# Patient Record
Sex: FEMALE | Race: WHITE | NOT HISPANIC OR LATINO | Employment: OTHER | URBAN - METROPOLITAN AREA
[De-identification: names, ages, dates, MRNs, and addresses within clinical notes are randomized per-mention and may not be internally consistent; named-entity substitution may affect disease eponyms.]

---

## 2017-01-19 ENCOUNTER — LAB (OUTPATIENT)
Dept: LAB | Facility: HOSPITAL | Age: 67
End: 2017-01-19

## 2017-01-19 ENCOUNTER — CONSULT (OUTPATIENT)
Dept: ONCOLOGY | Facility: CLINIC | Age: 67
End: 2017-01-19

## 2017-01-19 ENCOUNTER — APPOINTMENT (OUTPATIENT)
Dept: ONCOLOGY | Facility: CLINIC | Age: 67
End: 2017-01-19

## 2017-01-19 ENCOUNTER — DOCUMENTATION (OUTPATIENT)
Dept: ONCOLOGY | Facility: CLINIC | Age: 67
End: 2017-01-19

## 2017-01-19 VITALS
HEIGHT: 66 IN | BODY MASS INDEX: 32.95 KG/M2 | TEMPERATURE: 98.3 F | DIASTOLIC BLOOD PRESSURE: 64 MMHG | WEIGHT: 205 LBS | HEART RATE: 92 BPM | OXYGEN SATURATION: 96 % | RESPIRATION RATE: 16 BRPM | SYSTOLIC BLOOD PRESSURE: 112 MMHG

## 2017-01-19 DIAGNOSIS — C50.919 MALIGNANT NEOPLASM OF FEMALE BREAST, UNSPECIFIED LATERALITY, UNSPECIFIED SITE OF BREAST: Primary | ICD-10-CM

## 2017-01-19 DIAGNOSIS — C50.411 MALIGNANT NEOPLASM OF UPPER-OUTER QUADRANT OF RIGHT FEMALE BREAST (HCC): Primary | ICD-10-CM

## 2017-01-19 LAB
ALBUMIN SERPL-MCNC: 4.1 G/DL (ref 3.5–5.2)
ALBUMIN/GLOB SERPL: 1.4 G/DL (ref 1.1–2.4)
ALP SERPL-CCNC: 99 U/L (ref 38–116)
ALT SERPL W P-5'-P-CCNC: 13 U/L (ref 0–33)
ANION GAP SERPL CALCULATED.3IONS-SCNC: 12.2 MMOL/L
AST SERPL-CCNC: 15 U/L (ref 0–32)
BASOPHILS # BLD AUTO: 0.03 10*3/MM3 (ref 0–0.1)
BASOPHILS NFR BLD AUTO: 0.5 % (ref 0–1.1)
BILIRUB SERPL-MCNC: 0.3 MG/DL (ref 0.1–1.2)
BUN BLD-MCNC: 15 MG/DL (ref 6–20)
BUN/CREAT SERPL: 19.2 (ref 7.3–30)
CALCIUM SPEC-SCNC: 9.1 MG/DL (ref 8.5–10.2)
CHLORIDE SERPL-SCNC: 106 MMOL/L (ref 98–107)
CO2 SERPL-SCNC: 24.8 MMOL/L (ref 22–29)
CREAT BLD-MCNC: 0.78 MG/DL (ref 0.6–1.1)
DEPRECATED RDW RBC AUTO: 43.8 FL (ref 37–49)
EOSINOPHIL # BLD AUTO: 0.15 10*3/MM3 (ref 0–0.36)
EOSINOPHIL NFR BLD AUTO: 2.5 % (ref 1–5)
ERYTHROCYTE [DISTWIDTH] IN BLOOD BY AUTOMATED COUNT: 13.2 % (ref 11.7–14.5)
GFR SERPL CREATININE-BSD FRML MDRD: 73 ML/MIN/1.73
GLOBULIN UR ELPH-MCNC: 2.9 GM/DL (ref 1.8–3.5)
GLUCOSE BLD-MCNC: 101 MG/DL (ref 74–124)
HCT VFR BLD AUTO: 38.6 % (ref 34–45)
HGB BLD-MCNC: 12.7 G/DL (ref 11.5–14.9)
HOLD SPECIMEN: NORMAL
IMM GRANULOCYTES # BLD: 0.03 10*3/MM3 (ref 0–0.03)
IMM GRANULOCYTES NFR BLD: 0.5 % (ref 0–0.5)
LYMPHOCYTES # BLD AUTO: 2.11 10*3/MM3 (ref 1–3.5)
LYMPHOCYTES NFR BLD AUTO: 35.1 % (ref 20–49)
MCH RBC QN AUTO: 29.4 PG (ref 27–33)
MCHC RBC AUTO-ENTMCNC: 32.9 G/DL (ref 32–35)
MCV RBC AUTO: 89.4 FL (ref 83–97)
MONOCYTES # BLD AUTO: 0.47 10*3/MM3 (ref 0.25–0.8)
MONOCYTES NFR BLD AUTO: 7.8 % (ref 4–12)
NEUTROPHILS # BLD AUTO: 3.22 10*3/MM3 (ref 1.5–7)
NEUTROPHILS NFR BLD AUTO: 53.6 % (ref 39–75)
NRBC BLD MANUAL-RTO: 0 /100 WBC (ref 0–0)
PLATELET # BLD AUTO: 293 10*3/MM3 (ref 150–375)
PMV BLD AUTO: 9.3 FL (ref 8.9–12.1)
POTASSIUM BLD-SCNC: 4 MMOL/L (ref 3.5–4.7)
PROT SERPL-MCNC: 7 G/DL (ref 6.3–8)
RBC # BLD AUTO: 4.32 10*6/MM3 (ref 3.9–5)
SODIUM BLD-SCNC: 143 MMOL/L (ref 134–145)
WBC NRBC COR # BLD: 6.01 10*3/MM3 (ref 4–10)
WHOLE BLOOD HOLD SPECIMEN: NORMAL

## 2017-01-19 PROCEDURE — 80053 COMPREHEN METABOLIC PANEL: CPT | Performed by: INTERNAL MEDICINE

## 2017-01-19 PROCEDURE — 36415 COLL VENOUS BLD VENIPUNCTURE: CPT

## 2017-01-19 PROCEDURE — 85025 COMPLETE CBC W/AUTO DIFF WBC: CPT

## 2017-01-19 PROCEDURE — 99205 OFFICE O/P NEW HI 60 MIN: CPT | Performed by: INTERNAL MEDICINE

## 2017-01-19 NOTE — PROGRESS NOTES
Subjective   REASON FOR CONSULTATION:  Right breast cancer T1c N0 ER/TN positive HER-2 negative  Provide an opinion on any further workup or treatment                             REQUESTING PHYSICIAN:  Trino Troncoso M.D.    RECORDS OBTAINED:  Records of the patients history including those obtained from the referring provider were reviewed and summarized in detail.    HISTORY OF PRESENT ILLNESS:  The patient is a 66 y.o. year old female who is here for an opinion about the above issue.    History of Present Illness patient is a 66-year-old lady with a long history of depression hypercholesterolemia who was noted on routine mammogram on 16 to have an abnormality in the right breast at the 12 o'clock position.  This is not seen on a previous mammogram a year ago and this led to a diagnostic mammogram and ultrasound which confirmed a mass 12 o'clock position the right breast measuring about 1 cm by ultrasound with normal appearing right axillary lymph nodes.  A biopsy was done on 13 which revealed a grade 2 infiltrating ductal carcinoma ER/TN strongly positive HER-2 negative and the patient was referred to Dr. Troncoso.  Dr. Troncoso scheduled MRI of both breasts on 16 and this revealed a 1.8 x 1.3 cm mass in the right breast with no other abnormalities in the axilla or in the left breast and the patient was taken to surgery on 16 which time she had a needle localization lumpectomy and sentinel node biopsy.  Final pathology showed a 1.5 x 1.3 cm grade 2 infiltrating ductal carcinoma  With associated DCIS and margins that were clear after reexcision and one negative sentinel node.  Patient is on well since surgery and is here to discuss adjuvant chemotherapy options.  She was  3 para 3 first childbirth was at night age 17 she did not breast-feed her children.  Millicuries at age 11 and menopause at age 45 when she had a hysterectomy and oophorectomy.  She took hormonal therapy for 10 years and  stopped in 2005 when her sister had breast cancer she was on birth control pills for at least 20 years prior to her hysterectomy.  She has a sister who developed breast cancer at age 58 and her second cancer 2010 although one of these was DCIS and the other invasive cancer.  She took tamoxifen for 5 years  Is no other breast or ovarian cancer in the family but she has a very limited family as her father was an only child and her mother had 1 brother and she has one sister    Past Medical History   Diagnosis Date   • Breast CA    • Mitral valve regurgitation    • Mood swings    • PONV (postoperative nausea and vomiting)    • Sleep apnea         Past Surgical History   Procedure Laterality Date   • Tubal abdominal ligation     • Thyroid cyst excision     • Percutaneous pinning finger fracture     • Hysterectomy     • Breast biopsy     • Breast lumpectomy with sentinel node biopsy and axillary node dissection Right 12/13/2016     Procedure: RT BREAST LUMPECTOMY WITH SENTINEL NODE BIOPSY & MAMMO NEEDLE LOC;  Surgeon: Matt Troncoso MD;  [place] [facility] [place] [facility] [place]  Service:         Current Outpatient Prescriptions on File Prior to Visit   Medication Sig Dispense Refill   • busPIRone (BUSPAR) 10 MG tablet Take 10 mg by mouth Daily As Needed.     • Cholecalciferol (VITAMIN D PO) Take 1 tablet/day by mouth Daily.     • FLUoxetine (PROzac) 40 MG capsule Take 40 mg by mouth Daily.     • rosuvastatin (CRESTOR) 10 MG tablet Take 10 mg by mouth Daily.       No current facility-administered medications on file prior to visit.         ALLERGIES:    Allergies   Allergen Reactions   • Tegaderm Ag Mesh [Silver] Other (See Comments)     SKIN BLISTERS   • Sulfa Antibiotics Rash   • Zithromax [Azithromycin] Rash        Social History     Social History   • Marital status:      Spouse name: N/A   • Number of children: N/A   • Years of education: N/A     Occupational History   • Retired      Social History Main Topics   • Smoking  "status: Never Smoker   • Smokeless tobacco: Never Used   • Alcohol use No   • Drug use: No   • Sexual activity: Defer     Other Topics Concern   • None     Social History Narrative        No family history on file.    at 65 of metastatic colon cancer his parents lived to their 80s without cancer mother  at 93 of CHF and had only one brother who had no cancer.  She has only one sister who had breast cancer at age 58 and again at 63 no ovarian cancer in the family  Review of Systems   Constitutional: Negative for activity change, appetite change, fatigue and unexpected weight change.   Respiratory: Negative for cough and shortness of breath.    Musculoskeletal: Positive for back pain.        Objective     Vitals:    17 1437   BP: 112/64   Pulse: 92   Resp: 16   Temp: 98.3 °F (36.8 °C)   TempSrc: Oral   SpO2: 96%   Weight: 205 lb (93 kg)   Height: 66\" (167.6 cm)   PainSc: 0-No pain     Current Status 2017   ECOG score 0       Physical Exam    GENERAL:  Well-developed, well-nourished in no acute distress.   SKIN:  Warm, dry without rashes, purpura or petechiae.  EYES:  Pupils equal, round and reactive to light.  EOMs intact.  Conjunctivae normal.  EARS:  Hearing intact.  NOSE:  Septum midline.  No excoriations or nasal discharge.  MOUTH:  Tongue is well-papillated; no stomatitis or ulcers.  Lips normal.  THROAT:  Oropharynx without lesions or exudates.  NECK:  Supple with good range of motion; no thyromegaly or masses, no JVD.  LYMPHATICS:  No cervical, supraclavicular, axillary or inguinal adenopathy.  CHEST:  Lungs clear to auscultation. Good airflow.  CARDIAC:  Regular rate and rhythm without murmurs, rubs or gallops. Normal S1,S2.  ABDOMEN:  Soft, nontender with no hepatosplenomegaly or masses.  EXTREMITIES:  No clubbing, cyanosis or edema.  NEUROLOGICAL:  Cranial Nerves II-XII grossly intact.  No focal neurological deficits.  PSYCHIATRIC:  Normal affect and mood.        RECENT LABS:  Hematology " "WBC   Date Value Ref Range Status   01/19/2017 6.01 4.00 - 10.00 10*3/mm3 Final     RBC   Date Value Ref Range Status   01/19/2017 4.32 3.90 - 5.00 10*6/mm3 Final     HEMOGLOBIN   Date Value Ref Range Status   01/19/2017 12.7 11.5 - 14.9 g/dL Final     HEMATOCRIT   Date Value Ref Range Status   01/19/2017 38.6 34.0 - 45.0 % Final     PLATELETS   Date Value Ref Range Status   01/19/2017 293 150 - 375 10*3/mm3 Final        Final Diagnosis   1. BREAST, RIGHT, LUMPECTOMY WITH IMAGE-GUIDED LOCALIZATION:  INVASIVE DUCTAL CARCINOMA WITH ASSOCIATED DUCTAL CARCINOMA IN SITU (DCIS).  SEE SYNOPTIC REPORT (BELOW) FOR ADDITIONAL DETAILS.     2. SENTINEL LYMPH NODE, RIGHT AXILLA, EXCISION:  ONE LYMPH NODE, NEGATIVE FOR METASTATIC CARCINOMA.     3. BREAST, RIGHT, DESIGNATED \"ADDITONAL INFERIOR MARGIN\", RE-EXCISION:  BENIGN BREAST TISSUE.  NEW INFERIOR IS FREE OF ATYPIA AND MALIGNANCY.     4. BREAST, RIGHT, DESIGNATED \"ADDTIONAL SUPERIOR MARGIN\", RE-EXCISION:  BENIGN BREAST TISSUE.  NEW SUPERIOR MARGIN IS FREE OF ATYPIA AND MALIGNANCY.     5. BREAST, RIGHT, DESIGNATED \"ADDITIONAL LATERAL MARGIN\", RE-EXCISION:  BENIGN BREAST TISSUE.  NEW LATERAL MARGIN IS FREE OF ATYPIA AND MALIGNANCY.     SYNOPTIC REPORT (Based on [facility] Cancer Case Summary, version January 2016):  Procedure: Excision with image-guided localization and subsequent re-excision of inferior, superior,  and lateral margins.  Lymph Node Sampling: [place] lymph node.  Specimen Laterality: Right.  Tumor Site: Not specified.  Tumor Size (size of largest invasive carcinoma): 1.5 x 1.3 x 1.2 cm .   Histologic Type: Invasive ductal carcinoma.  Histologic Grade ([place] Histologic Score):  Glandular (ascinar)/tubular differentiation: Score 3.  Nuclear Pleomorphism: Score 2.  Mitotic Rate: Score 1.  Overall Grade: Grade 2 (score 6 of 9).  Tumor Focality: Single focus of invasive carcinoma.  Ductal Carcinoma In Situ (DCIS): DCIS is present.  Architectural Patterns: Solid and " cribriform.  Nuclear Grade: Grade II (intermediate).  Necrosis: Not identified.  Lobular Carcinoma In Situ (LCIS): Not identified.  Margins:  Anterior margin: Focally less than 1 mm from DCIS and 1 mm from invasive carcinoma.  Posterior margin: 1 mm from invasive carcinoma.  Superior margin: Greater than 10 mm from invasive carcinoma and from DCIS.  Inferior margin: Greater than 10 mm from invasive carcinoma and from DCIS.  Medial margin: 1 mm from invasive carcinoma.  Lateral margin: Greater than 10 mm from invasive carcinoma and from DCIS.  Lymph Nodes:  Total number of lymph nodes examined (sentinel and non-sentinel): 1.  Number of sentinel lymph nodes examined: 1.  Number of lymph nodes with macrometastases: 0.  Number of lymph nodes with micrometastases: 0.  Number of lymph nodes with isolated tumor cells: 0.  Method of evaluation of sentinel lymph nodes: H&E, multiple levels (confirmatory   immunohistochemical stains are available upon request.)  Treatment Effect (response to presurgical neoadjuvant therapy):  In the breast: No known presurgical therapy.  In the lymph nodes: No known presurgical therapy.  Lymph-vascular Invasion: Not identified.  Dermal lymph-vasclar Invasion: No skin present for evaluation.  Pathologic Staging:  Primary tumor: pT1c.  Regional Lymph Nodes: pN0(sn).  Distant Metastasis: Not applicable.   Additional Pathologic Findings:   Fibrocystic changes with duct dilatation and stasis, microcyst formation, and apocrine metaplasia.  Ductal hyperplasia of the usual type.  Focal columnar cell change without atypia.  Fibroinflammatory changes consistent with site of prior biopsy.  Ancillary studies: ER, NM, HER-2/darius, and Ki-67 studies performed on previous biopsy specimen at   outside facility ([facility]).     TEE/arcadio IHC/       Assessment/Plan   1.  T1 CN 0M0 ER/NM positive HER-2 negative right-sided breast cancer  2.  Mild mitral regurgitation  3 long history of depression on Prozac  4.   Positive family history of breast cancer in her sister and colon cancer in her father the very small immediate family    Plan  1.  We discussed options for hormonal therapy alone versus further testing with Oncotype assay on the off chance that she should have a high risk profile which is been mandate brief chemotherapy.  I told her that if she was not willing to take chemotherapy there would be no reason to test the tumor for the risk profile but she is agreeable to chemotherapy if she should be high risk and therefore we will send the Oncotype assay.  She understands that she will have at least 5 years of hormonal therapy despite the results of the Oncotype and that radiation will be planned because of her lumpectomy.  2.  I have told her that it may be reasonable to get genetic counseling because she has a very small immediate family but this does not negative her history and genetic counselors may feel she would benefit from testing for her children sake.  3.  Radiation therapy appointment scheduled in 3-4 weeks before which we should have the Oncotype results which I suspect will be low risk or intermediate    Follow-up in 3 weeks to review the Oncotype score has been scheduled

## 2017-01-23 NOTE — PROGRESS NOTES
"MAIRA met with the patient and her daughter, Emily, prior to her consultation with Dr. Correa about her breast cancer. She has had a lumpectomy, and had a negative sentinel node. She said this was found with the new 3D mammography. She has been told that it is early stage cancer that is noninvasive. She expects that she will be prescribed a hormone blocking pill, plus radiation. She has a cancer policy. MAIRA referred her to Amadou Gutierrez for processing such documents.    Pt has been  for 35 years. She has 3 daughters and 1 son. 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     She reports that she is a Jewish. Her case has helped her accept this disease as part of His plan for her. She hopes to be able to help others who receive this diagnosis in the future.     Pt is very polite and friendly. She is oriented x 3. She reports having a strong Confucianism case, which is a great source of comfort for her. Her daughter seems very supportive and concerned. She declined information about support groups at this time. [facility]A services were explained and assistance offered as needed in the future.   "

## 2017-02-06 ENCOUNTER — APPOINTMENT (OUTPATIENT)
Dept: ONCOLOGY | Facility: CLINIC | Age: 67
End: 2017-02-06

## 2017-02-06 ENCOUNTER — APPOINTMENT (OUTPATIENT)
Dept: ONCOLOGY | Facility: HOSPITAL | Age: 67
End: 2017-02-06

## 2017-02-08 ENCOUNTER — OFFICE VISIT (OUTPATIENT)
Dept: ONCOLOGY | Facility: CLINIC | Age: 67
End: 2017-02-08

## 2017-02-08 ENCOUNTER — APPOINTMENT (OUTPATIENT)
Dept: LAB | Facility: HOSPITAL | Age: 67
End: 2017-02-08

## 2017-02-08 VITALS
HEART RATE: 75 BPM | DIASTOLIC BLOOD PRESSURE: 82 MMHG | RESPIRATION RATE: 14 BRPM | SYSTOLIC BLOOD PRESSURE: 128 MMHG | TEMPERATURE: 98.2 F | HEIGHT: 66 IN | OXYGEN SATURATION: 96 % | BODY MASS INDEX: 33.14 KG/M2 | WEIGHT: 206.2 LBS

## 2017-02-08 DIAGNOSIS — C50.411 MALIGNANT NEOPLASM OF UPPER-OUTER QUADRANT OF RIGHT FEMALE BREAST (HCC): Primary | ICD-10-CM

## 2017-02-08 PROCEDURE — 99214 OFFICE O/P EST MOD 30 MIN: CPT | Performed by: INTERNAL MEDICINE

## 2017-02-08 PROCEDURE — G0463 HOSPITAL OUTPT CLINIC VISIT: HCPCS | Performed by: INTERNAL MEDICINE

## 2017-02-08 RX ORDER — ANASTROZOLE 1 MG/1
1 TABLET ORAL DAILY
Qty: 90 TABLET | Refills: 3 | Status: SHIPPED | OUTPATIENT
Start: 2017-02-08 | End: 2018-04-04 | Stop reason: SDUPTHER

## 2017-02-08 NOTE — PROGRESS NOTES
Subjective   REASON FOR FOLLOWUP:    1.Right breast cancer T1c N0 ER/NM positive HER-2 negative  2.  Oncotype score of 20  3.  Radiation and Arimidex planned bone density shows osteopenia                               REQUESTING PHYSICIAN:  Trino Troncoso M.D.        History of Present Illness patient is a 58 a lady with a recently diagnosed right breast cancer T1 CN 0 ER/NM positive HER-2 negative here to review her Oncotype score.  She is doing very well has no complaints.  The Oncotype score came back at 20 which is the low intermediate range.  We'll review the benefits of hormonal therapy and I told her that the data on using chemotherapy in the intermediate group was not well understood and was still under clinical investigation.  However at this point I do not feel strongly that she would benefit from chemotherapy and we have opted to move on with Arimidex and she is agreeable.  Side effect profile was discussed including hot flashes and occasional nausea and arthralgias.  Her bone density showed osteopenia and she is not taking any calcium or vitamin D and I suggested she do this.  She will be referred back to radiation therapy and I will see her in 2-3 months to assess her tolerance of the Arimidex.      Past Medical History   Diagnosis Date   • Breast CA    • Depression    • Hyperlipidemia    • Mitral valve regurgitation    • Mood swings    • PONV (postoperative nausea and vomiting)    • Sleep apnea         Past Surgical History   Procedure Laterality Date   • Tubal abdominal ligation     • Thyroid cyst excision     • Percutaneous pinning finger fracture     • Hysterectomy     • Breast biopsy     • Breast lumpectomy with sentinel node biopsy and axillary node dissection Right 12/13/2016     Procedure: RT BREAST LUMPECTOMY WITH SENTINEL NODE BIOPSY & MAMMO NEEDLE LOC;  Surgeon: Matt Troncoso MD;  Location: [facility] [place] [facility] [place];  Service:       ONCOLOGIC HISTORY:   patient is a 66-year-old lady with a long  history of depression hypercholesterolemia who was noted on routine mammogram on 16 to have an abnormality in the right breast at the 12 o'clock position.  This is not seen on a previous mammogram a year ago and this led to a diagnostic mammogram and ultrasound which confirmed a mass 12 o'clock position the right breast measuring about 1 cm by ultrasound with normal appearing right axillary lymph nodes.  A biopsy was done on 13 which revealed a grade 2 infiltrating ductal carcinoma ER/ND strongly positive HER-2 negative and the patient was referred to Dr. rToncoso.  Dr. Troncoso scheduled MRI of both breasts on 16 and this revealed a 1.8 x 1.3 cm mass in the right breast with no other abnormalities in the axilla or in the left breast and the patient was taken to surgery on 16 which time she had a needle localization lumpectomy and sentinel node biopsy.  Final pathology showed a 1.5 x 1.3 cm grade 2 infiltrating ductal carcinoma  With associated DCIS and margins that were clear after reexcision and one negative sentinel node.  Patient is on well since surgery and is here to discuss adjuvant chemotherapy options.  She was  3 para 3 first childbirth was at night age 17 she did not breast-feed her children.  Menarche  at age 11 and menopause at age 45 when she had a hysterectomy and oophorectomy.  She took hormonal therapy for 10 years and stopped in  when her sister had breast cancer she was on birth control pills for at least 20 years prior to her hysterectomy.  She has a sister who developed breast cancer at age 58 and her second cancer  although one of these was DCIS and the other invasive cancer.  She took tamoxifen for 5 years  Is no other breast or ovarian cancer in the family but she has a very limited family as her father was an only child and her mother had 1 brother and she has one sister  Oncotype score returned at 20 and after looking at the risks and benefits of chemotherapy  in this subset which I explained was not well understood at this point pending results of the [employer] trial we have opted to give her Arimidex and start radiation.  .      Current Outpatient Prescriptions on File Prior to Visit   Medication Sig Dispense Refill   • busPIRone (BUSPAR) 10 MG tablet Take 10 mg by mouth Daily As Needed.     • Cholecalciferol (VITAMIN D PO) Take 1 tablet/day by mouth Daily.     • FLUoxetine (PROzac) 40 MG capsule Take 40 mg by mouth Daily.     • rosuvastatin (CRESTOR) 10 MG tablet Take 10 mg by mouth Daily.       No current facility-administered medications on file prior to visit.         ALLERGIES:    Allergies   Allergen Reactions   • Chlorhexidine    • Erythromycin    • Tegaderm Ag Mesh [Silver] Other (See Comments)     SKIN BLISTERS   • Sulfa Antibiotics Rash   • Zithromax [Azithromycin] Rash        Social History     Social History   • Marital status:      Spouse name: Peter   • Number of children: N/A   • Years of education: High school     Occupational History   •  Retired     Social History Main Topics   • Smoking status: Never Smoker   • Smokeless tobacco: Never Used   • Alcohol use No   • Drug use: No   • Sexual activity: Defer     Other Topics Concern   • None     Social History Narrative        Family History   Problem Relation Age of Onset   • Heart failure Mother    • Colon cancer Father    • Liver cancer Father    • Tuberculosis Father    • Breast cancer Sister 58     DCIS      Father  at 65 of metastatic colon cancer his parents lived to their 80s without cancer mother  at 93 of CHF and had only one brother who had no cancer.  She has only one sister who had breast cancer at age 58 and again at 63 no ovarian cancer in the family  Review of Systems   Constitutional: Negative for activity change, appetite change, fatigue and unexpected weight change.   Respiratory: Negative for cough and shortness of breath.    Musculoskeletal: Positive for back pain.     "    Objective     Vitals:    02/08/17 1031   BP: 128/82   Pulse: 75   Resp: 14   Temp: 98.2 °F (36.8 °C)   TempSrc: Oral   SpO2: 96%   Weight: 206 lb 3.2 oz (93.5 kg)   Height: 66\" (167.6 cm)   PainSc: 0-No pain     Current Status 2/8/2017   ECOG score 0       Physical Exam    GENERAL:  Well-developed, well-nourished in no acute distress.   SKIN:  Warm, dry without rashes, purpura or petechiae.  EYES:  Pupils equal, round and reactive to light.  EOMs intact.  Conjunctivae normal.  EARS:  Hearing intact.  NOSE:  Septum midline.  No excoriations or nasal discharge.  MOUTH:  Tongue is well-papillated; no stomatitis or ulcers.  Lips normal.  THROAT:  Oropharynx without lesions or exudates.  NECK:  Supple with good range of motion; no thyromegaly or masses, no JVD.  LYMPHATICS:  No cervical, supraclavicular, axillary or inguinal adenopathy.  CHEST:  Lungs clear to auscultation. Good airflow.  CARDIAC:  Regular rate and rhythm without murmurs, rubs or gallops. Normal S1,S2.  ABDOMEN:  Soft, nontender with no hepatosplenomegaly or masses.  EXTREMITIES:  No clubbing, cyanosis or edema.  NEUROLOGICAL:  Cranial Nerves II-XII grossly intact.  No focal neurological deficits.  PSYCHIATRIC:  Normal affect and mood.        RECENT LABS:  Hematology WBC   Date Value Ref Range Status   01/19/2017 6.01 4.00 - 10.00 10*3/mm3 Final     RBC   Date Value Ref Range Status   01/19/2017 4.32 3.90 - 5.00 10*6/mm3 Final     HEMOGLOBIN   Date Value Ref Range Status   01/19/2017 12.7 11.5 - 14.9 g/dL Final     HEMATOCRIT   Date Value Ref Range Status   01/19/2017 38.6 34.0 - 45.0 % Final     PLATELETS   Date Value Ref Range Status   01/19/2017 293 150 - 375 10*3/mm3 Final            Assessment/Plan   1.  T1 CN 0M0 ER/MA positive HER-2 negative right-sided breast cancer  2.  Mild mitral regurgitation  3 long history of depression on Prozac  4.  Positive family history of breast cancer in her sister and colon cancer in her father the very small " immediate family  5.  oncotype score of 20 -Arimidex plus radiation planned       Plan  1.  Arimidex 1 mg daily for 5 years at least  2.  Proceed with radiation  3.  Calcium 1500+ vitamin D 1000 units daily  4.  Return in 3 months review her tolerance of Arimidex

## 2017-02-09 ENCOUNTER — APPOINTMENT (OUTPATIENT)
Dept: RADIATION ONCOLOGY | Facility: HOSPITAL | Age: 67
End: 2017-02-09

## 2017-02-09 ENCOUNTER — OFFICE VISIT (OUTPATIENT)
Dept: RADIATION ONCOLOGY | Facility: CLINIC | Age: 67
End: 2017-02-09

## 2017-02-09 VITALS
SYSTOLIC BLOOD PRESSURE: 123 MMHG | DIASTOLIC BLOOD PRESSURE: 77 MMHG | OXYGEN SATURATION: 96 % | HEART RATE: 66 BPM | TEMPERATURE: 97.5 F | HEIGHT: 66 IN | WEIGHT: 207 LBS | BODY MASS INDEX: 33.27 KG/M2

## 2017-02-09 DIAGNOSIS — C50.411 MALIGNANT NEOPLASM OF UPPER-OUTER QUADRANT OF RIGHT FEMALE BREAST (HCC): Primary | ICD-10-CM

## 2017-02-09 PROCEDURE — 77263 THER RADIOLOGY TX PLNG CPLX: CPT | Performed by: RADIOLOGY

## 2017-02-09 PROCEDURE — G0463 HOSPITAL OUTPT CLINIC VISIT: HCPCS | Performed by: RADIOLOGY

## 2017-02-09 PROCEDURE — 77332 RADIATION TREATMENT AID(S): CPT | Performed by: RADIOLOGY

## 2017-02-09 PROCEDURE — 99244 OFF/OP CNSLTJ NEW/EST MOD 40: CPT | Performed by: RADIOLOGY

## 2017-02-09 PROCEDURE — 77290 THER RAD SIMULAJ FIELD CPLX: CPT | Performed by: RADIOLOGY

## 2017-02-09 NOTE — PROGRESS NOTES
DIAGNOSIS and REASON FOR CONSULTATION: Malignant neoplasm of upper-outer quadrant of right female breast        Pathologic: Stage IA (T1c, N0, cM0)     Referring Provider:  Aaron Correa MD  Patient Care Team:  SCOTT Gimenez as PCP - General (Physician Assistant)  Jaime Puente MD as Consulting Physician (Cardiology)  Aaron Correa MD as Consulting Physician (Hematology and Oncology)  AMANDA Craven as Consulting Physician (Obstetrics and Gynecology)  Matt Troncoso MD as Referring Physician (General Surgery)  AMANDA Craven as Referring Physician (Obstetrics and Gynecology)    CHIEF COMPLAINT:  Malignant neoplasm of upper-outer quadrant of right female breast  HISTORY OF PRESENT ILLNESS:  The patient is a 66 y.o. year old female who was found to have an abnormality on routine screening mammogram dated November 9, 2016.  This revealed a focal asymmetry measuring 1.6 cm in the 12 o'clock position of the right breast.  She underwent diagnostic mammogram and ultrasound on the right on November 21, 2016 which confirmed an irregular mass measuring 1.2 cm in the middle one third region of the right breast at 12:00.  Several stable axillary nodes were appreciated on the right which show no change.  Ultrasound showed the mass to be solid and measure 1.0 x 1.0 x 1.0 cm with a normal axilla.    She underwent an ultrasound-guided biopsy and clip placement of the mass at 12:00 on the right on November 23, 2015 which revealed an invasive ductal carcinoma of intermediate grade with focal DCIS noted.  There was no lymphovascular invasion noted and invasive carcinoma measured 1.0 cm in greatest dimension.  The tissue was found to be positive for both estrogen and progesterone receptors and negative for the HER-2/darius oncogene.    She underwent bilateral breast MRI on December 11, 2016 which showed the enhancing mass at the 1 o'clock position of the right breast with the clip noted.  The mass measured  1.8 x 0.9 x 1.3 cm.  Nothing further was appreciated in the right breast, right axilla, left breast or left axilla.    She opted for breast conservation and went to surgery on December 13, 2016 and underwent a needle localized right breast lumpectomy with sentinel lymph node biopsy.  The final pathology revealed an invasive ductal carcinoma with associated DCIS.  The carcinoma measured 1.5 x 1.3 x 1.2 cm and was again grade 2.  The margins were all negative though 1 mm anteriorly, posteriorly and medially.  Further tissue labeled additional inferior margin and additional superior margin were without further evidence of disease.  In addition 0 of 1 sentinel nodes were involved.  Therefore she appears to have a T1c N0 hormone receptor positive grade 2 invasive ductal carcinoma of the right breast.    She was seen by the [facility] is suggested given her family history of a sister diagnosed with breast cancer at age 58, suggested referral to the genetics clinic which she declined.  In addition they sent off her Oncotype assay to help with the chemotherapy decision.  This returned at 20 putting her in the intermediate risk group and after a discussion about chemotherapy they agreed to move on with arimidex therapy only.  She presents today to discuss the radiation therapy portion of her breast conservation treatment.    Clinically, she is doing well. She has no complaints regarding the breast. She is feeling well and states her performance status is at it's baseline.    Past Medical History: she  has a past medical history of Breast CA; Depression; Hyperlipidemia; Mitral valve regurgitation; Mood swings; PONV (postoperative nausea and vomiting); and Sleep apnea.    Past Surgical History:  she has a past surgical history that includes Tubal ligation; Thyroid cyst excision; Percutaneous pinning finger fracture; Hysterectomy; Breast biopsy; and breast lumpectomy with sentinel node biopsy and axillary node dissection  (Right, 12/13/2016).    Meds:    Current Outpatient Prescriptions:   •  anastrozole (ARIMIDEX) 1 MG tablet, Take 1 tablet by mouth Daily., Disp: 90 tablet, Rfl: 3  •  busPIRone (BUSPAR) 10 MG tablet, Take 10 mg by mouth Daily As Needed., Disp: , Rfl:   •  Cholecalciferol (VITAMIN D PO), Take 1 tablet/day by mouth Daily., Disp: , Rfl:   •  FLUoxetine (PROzac) 40 MG capsule, Take 40 mg by mouth Daily., Disp: , Rfl:   •  rosuvastatin (CRESTOR) 10 MG tablet, Take 10 mg by mouth Daily., Disp: , Rfl:     Allergies:    Allergies   Allergen Reactions   • Chlorhexidine    • Erythromycin    • Tegaderm Ag Mesh [Silver] Other (See Comments)     SKIN BLISTERS   • Sulfa Antibiotics Rash   • Zithromax [Azithromycin] Rash       Family History:  her family history includes Breast cancer (age of onset: 58) in her sister; Colon cancer in her father; Heart failure in her mother; Liver cancer in her father; Tuberculosis in her father.    Social History:  she  reports that she has never smoked. She has never used smokeless tobacco. She reports that she does not drink alcohol or use illicit drugs.    Pertinent Findings on   Review of Systems   Constitutional: Negative for appetite change, chills, diaphoresis, fatigue, fever and unexpected weight change.   HENT:   Negative for hearing loss, lump/mass, mouth sores, nosebleeds, sore throat, tinnitus, trouble swallowing and voice change.    Eyes: Negative for eye problems and icterus.   Respiratory: Negative for chest tightness, cough, dizziness on exertion, hemoptysis, shortness of breath and wheezing.    Cardiovascular: Negative for chest pain, leg swelling and palpitations.   Gastrointestinal: Negative for abdominal distention, abdominal pain, blood in stool, constipation, diarrhea, nausea, rectal pain and vomiting.   Endocrine: Negative for hot flashes.   Genitourinary: Negative for bladder incontinence, difficulty urinating, dyspareunia, dysuria, frequency, hematuria, menstrual problem,  "nocturia, pelvic pain, vaginal bleeding and vaginal discharge.    Musculoskeletal: Negative for arthralgias, back pain, flank pain, gait problem, myalgias, neck pain and neck stiffness.   Skin: Negative for itching, rash and wound.   Neurological: Negative for dizziness, extremity weakness, gait problem, headaches, light-headedness, numbness, seizures and speech difficulty.   Hematological: Negative for adenopathy. Does not bruise/bleed easily.   Psychiatric/Behavioral: Positive for depression. Negative for confusion, decreased concentration, sleep disturbance and suicidal ideas. The patient is nervous/anxious.    :  Vitals:    02/09/17 1043   BP: 123/77   Pulse: 66   Temp: 97.5 °F (36.4 °C)   TempSrc: Oral   SpO2: 96%   Weight: 207 lb (93.9 kg)   Height: 66\" (167.6 cm)   PainSc: 0-No pain       Performance Status: (1) Restricted in physically strenuous activity, ambulatory and able to do work of light nature    Pertinent Findings on:  Physical Exam   Constitutional: She is oriented to person, place, and time. She appears well-developed and well-nourished. She is cooperative.   HENT:   Head: Normocephalic.   Neck: Normal range of motion. No erythema present.   Pulmonary/Chest: Breath sounds normal. She has no wheezes. She exhibits no mass, no tenderness and no edema. Right breast exhibits no inverted nipple, no mass, no nipple discharge, no skin change and no tenderness. Left breast exhibits no inverted nipple, no mass, no nipple discharge, no skin change and no tenderness. There is no breast swelling.   The left breast is without abnormality as is the left axilla. The right breast shows a healing lumpectomy incision over the superior central aspect of the breast. There is no skin or nipple abnormality, no warmth or erythema.  She does have 2 areas of erythema associated with reaction to tape around the incision area and at the cleavage. Those are healing well.   Musculoskeletal: Normal range of motion. "   Lymphadenopathy:     She has no cervical adenopathy.     She has no axillary adenopathy.        Right axillary: No pectoral adenopathy present.        Left axillary: No pectoral adenopathy present.       Right: No supraclavicular adenopathy present.        Left: No supraclavicular adenopathy present.   The axillary incision is also healing well. There is no palpable axillary, supraclavicular or cervical lymphadenopathy appreciated. No lymphedema is noted in either upper extremity.   Neurological: She is alert and oriented to person, place, and time.   Psychiatric: Her mood appears not anxious. Her affect is not angry. She does not exhibit a depressed mood.       Assessment:   1. Malignant neoplasm of upper-outer quadrant of right female breast        Pathologic: Stage IA (T1c, N0, cM0)     Plan:   We reviewed the specifics of her clinical, imaging and pathology findings today and also talked through the role of radiation therapy in her breast conservation treatment.  She and her daughter did have numerous questions about the biopsy pathology as well as the lumpectomy pathology which we reviewed. We talked at great length about he differences between DCIS and invasive disease and I gave them a copy of both path reports.      Given the size of the breast, I recommended a course of postoperative whole breast radiation therapy consisting of 5040 cGy aimed at the breast given over 28 treatments if her insurance will allow it. We will then plan on boosting the tumor bed region as I will delineate it on her treatment planning scan with an additional 1000 cGy given in five treatments. The above course of treatment should be completed in 6 1/2 weeks.    We discussed the specifics, logistics and side effects of this course of treatment  which may involve acutely, irritation of the skin, including erythema and possibly moist desquamation, tenderness of the musculature of the chest wall and mild fatigue. We discussed the long  term possibility of mild hyperpigmentation and fibrosis of the breast, mild increased incidence of rib fracture and radiation pneumonitis.  We also discussed the importance of our treatment planning process in protecting these underlying structures as much as possible, specifically ribs and lung and I believe all her questions were answered to her satisfaction.      We will start our treatment planning process with a CT scan, which we were able to complete today. I am planning on getting her treatment planning finalized and treatments underway early next week.    I spent over 65  minutes face-to-face with the patient today and of that time, 50 minutes were spent counseling and coordinating care.

## 2017-02-15 ENCOUNTER — DOCUMENTATION (OUTPATIENT)
Dept: ONCOLOGY | Facility: CLINIC | Age: 67
End: 2017-02-15

## 2017-02-15 PROCEDURE — 77300 RADIATION THERAPY DOSE PLAN: CPT | Performed by: RADIOLOGY

## 2017-02-15 PROCEDURE — 77295 3-D RADIOTHERAPY PLAN: CPT | Performed by: RADIOLOGY

## 2017-02-15 NOTE — PROGRESS NOTES
Pt completed the Distress Questionnaire on 2/9/17 when she came for consultation at [facility] with Dr. Hayes for breast cancer. She scored 1/10. Pt met with Yeimi Chávez LCSW, on 1/19/17 and is aware that OSW is available as needs arise.    Anny Pendleton, JUDY  Oncology Social Worker

## 2017-02-21 PROCEDURE — 77427 RADIATION TX MANAGEMENT X5: CPT | Performed by: RADIOLOGY

## 2017-02-21 PROCEDURE — 77334 RADIATION TREATMENT AID(S): CPT | Performed by: RADIOLOGY

## 2017-02-21 PROCEDURE — 77280 THER RAD SIMULAJ FIELD SMPL: CPT | Performed by: RADIOLOGY

## 2017-02-21 PROCEDURE — 77412 RADIATION TX DELIVERY LVL 3: CPT | Performed by: RADIOLOGY

## 2017-02-21 PROCEDURE — 77417 THER RADIOLOGY PORT IMAGE(S): CPT | Performed by: RADIOLOGY

## 2017-02-22 PROCEDURE — 77412 RADIATION TX DELIVERY LVL 3: CPT | Performed by: RADIOLOGY

## 2017-02-23 PROCEDURE — 77412 RADIATION TX DELIVERY LVL 3: CPT | Performed by: RADIOLOGY

## 2017-02-23 PROCEDURE — 77336 RADIATION PHYSICS CONSULT: CPT | Performed by: RADIOLOGY

## 2017-02-24 PROCEDURE — 77412 RADIATION TX DELIVERY LVL 3: CPT | Performed by: RADIOLOGY

## 2017-02-27 PROCEDURE — 77412 RADIATION TX DELIVERY LVL 3: CPT | Performed by: RADIOLOGY

## 2017-02-28 ENCOUNTER — RADIATION ONCOLOGY WEEKLY ASSESSMENT (OUTPATIENT)
Dept: RADIATION ONCOLOGY | Facility: CLINIC | Age: 67
End: 2017-02-28

## 2017-02-28 VITALS
OXYGEN SATURATION: 98 % | HEART RATE: 73 BPM | SYSTOLIC BLOOD PRESSURE: 123 MMHG | DIASTOLIC BLOOD PRESSURE: 77 MMHG | WEIGHT: 207 LBS | BODY MASS INDEX: 33.27 KG/M2 | HEIGHT: 66 IN | TEMPERATURE: 97.4 F

## 2017-02-28 DIAGNOSIS — C50.411 MALIGNANT NEOPLASM OF UPPER-OUTER QUADRANT OF RIGHT FEMALE BREAST (HCC): Primary | ICD-10-CM

## 2017-02-28 PROCEDURE — 77412 RADIATION TX DELIVERY LVL 3: CPT | Performed by: RADIOLOGY

## 2017-02-28 PROCEDURE — 77417 THER RADIOLOGY PORT IMAGE(S): CPT | Performed by: RADIOLOGY

## 2017-02-28 PROCEDURE — 77427 RADIATION TX MANAGEMENT X5: CPT | Performed by: RADIOLOGY

## 2017-02-28 NOTE — PROGRESS NOTES
"Diagnosis:   Breast CA            Pathologic: Stage IA (T1c, N0, cM0)    Reason for Visit: Radiation (6/33)    Concurrent Chemo:   No    Notes on Treatment course, Films, Medical progress and Plan:  Doing well. No problems or questions, cont on.    ROS:   Constitutional - Normal - Denies lack of appetite, fatigue, fever, night sweats and change in weight.  Neck - Normal - Denies neck masses, muscle weakness, neck pain, decreased range of motion and swelling of the neck.  Breasts - Normal - Denies breast masses, nipple discharge, nipple inversion and pain.  Respiratory - Normal - Denies cough, dyspnea, hemoptysis, hiccoughs, pleuritic chest pain and wheezing.  Gastrointestinal - Normal - Denies abdominal pain, constipation, diarrhea, heartburn / dyspepsia, hematemesis, hemorrhoids, melena / GI bleeding, nausea, pain / cramping, satiety and vomiting.  Musculoskeletal - Normal - Denies arthritis, bone pain, joint pain, muscle weakness and decreased range of motion.   Hematologic/Lymphatic - Normal - Denies easy bruising and tender or enlarged lymph nodes.    PYHSICAL EXAM:  Vitals:    02/28/17 1044   BP: 123/77   Pulse: 73   Temp: 97.4 °F (36.3 °C)   TempSrc: Oral   SpO2: 98%   Weight: 207 lb (93.9 kg)   Height: 66\" (167.6 cm)   PainSc: 0-No pain       Constitutional - Normal - No evidence of impaired alertness, inadequate appearance, premature or advanced chronologic age, uncooperativeness, altered mood and affect and disorientation.  Neck - Normal - No evidence of tender or enlarged lymph nodes, neck abnormalities, restricted range of motion and enlarged thyroid gland.  Breasts - Normal - No change in nipple or incision site.  Chest - Normal - No evidence of chest abnormalities and tender or enlarged lymph nodes.  Hematologic/Lymphatic -  Normal - No evidence of tender or enlarged axillae lymph nodes and tender or enlarged neck lymph nodes.    Performance Status: (1) Restricted in physically strenuous activity, " "ambulatory and able to do work of light nature  Problem added:  No problems updated.  Medications added: No orders of the defined types were placed in this encounter.    Ancillary referrals made: No orders of the defined types were placed in this encounter.      Technical aspects reviewed:  Weekly OBI approved if applicable? Yes  Weekly port films approved?   Yes  Change requests noted if applicable? Yes  Patient setup and plan reviewed?  Yes    Chart Reviewed:  Continue current treatment plan?   Yes  Treatment plan change requested?  No    I have reviewed and marked as \"reviewed\" the current medications, allergies and problem list in the patients EMR.    I have reviewed the patient's medical, surgical  history in detail, reviewed any pertinent lab work  and updated the computerized patient record if needed.    Patient's Care Team:  Patient Care Team:  SCOTT Gimenez as PCP - General (Physician Assistant)  Jaime Puente MD as Consulting Physician (Cardiology)  Aaron Correa MD as Consulting Physician (Hematology and Oncology)  AMANDA Craven as Consulting Physician (Obstetrics and Gynecology)  Matt Troncoso MD as Referring Physician (General Surgery)  AMANDA Craven as Referring Physician (Obstetrics and Gynecology)    Seen and approved by:  Tejal Hayes MD, 02/28/2017  "

## 2017-03-01 ENCOUNTER — APPOINTMENT (OUTPATIENT)
Dept: RADIATION ONCOLOGY | Facility: HOSPITAL | Age: 67
End: 2017-03-01

## 2017-03-01 PROCEDURE — 77412 RADIATION TX DELIVERY LVL 3: CPT | Performed by: RADIOLOGY

## 2017-03-01 PROCEDURE — 77417 THER RADIOLOGY PORT IMAGE(S): CPT | Performed by: RADIOLOGY

## 2017-03-02 PROCEDURE — 77336 RADIATION PHYSICS CONSULT: CPT | Performed by: RADIOLOGY

## 2017-03-02 PROCEDURE — 77412 RADIATION TX DELIVERY LVL 3: CPT | Performed by: RADIOLOGY

## 2017-03-03 PROCEDURE — 77412 RADIATION TX DELIVERY LVL 3: CPT | Performed by: RADIOLOGY

## 2017-03-06 PROCEDURE — 77412 RADIATION TX DELIVERY LVL 3: CPT | Performed by: RADIOLOGY

## 2017-03-07 PROCEDURE — 77427 RADIATION TX MANAGEMENT X5: CPT | Performed by: RADIOLOGY

## 2017-03-07 PROCEDURE — 77412 RADIATION TX DELIVERY LVL 3: CPT | Performed by: RADIOLOGY

## 2017-03-08 PROCEDURE — 77412 RADIATION TX DELIVERY LVL 3: CPT | Performed by: RADIOLOGY

## 2017-03-09 ENCOUNTER — RADIATION ONCOLOGY WEEKLY ASSESSMENT (OUTPATIENT)
Dept: RADIATION ONCOLOGY | Facility: CLINIC | Age: 67
End: 2017-03-09

## 2017-03-09 VITALS
HEIGHT: 66 IN | BODY MASS INDEX: 33.11 KG/M2 | DIASTOLIC BLOOD PRESSURE: 73 MMHG | SYSTOLIC BLOOD PRESSURE: 107 MMHG | OXYGEN SATURATION: 98 % | HEART RATE: 69 BPM | WEIGHT: 206 LBS | TEMPERATURE: 98.1 F

## 2017-03-09 DIAGNOSIS — C50.411 MALIGNANT NEOPLASM OF UPPER-OUTER QUADRANT OF RIGHT FEMALE BREAST (HCC): Primary | ICD-10-CM

## 2017-03-09 PROCEDURE — 77336 RADIATION PHYSICS CONSULT: CPT | Performed by: RADIOLOGY

## 2017-03-09 PROCEDURE — 77412 RADIATION TX DELIVERY LVL 3: CPT | Performed by: RADIOLOGY

## 2017-03-09 PROCEDURE — 77417 THER RADIOLOGY PORT IMAGE(S): CPT | Performed by: RADIOLOGY

## 2017-03-09 NOTE — PROGRESS NOTES
Physician Weekly Management Note    Diagnosis:     Diagnosis Plan   1. Malignant neoplasm of upper-outer quadrant of right female breast         RT Details:  Treatment #13/33     Notes on Treatment course, Films, Medical progress:    Doing well no complaints or questions.  Very minor erythema in the treatment portal no skin issues denies fatigue continue as planned.    Weekly Management:  Medication reviewed?   Yes  New medications given?   No  Problemlist reviewed?   Yes  Problem added?   No      Technical aspects reviewed:  Weekly OBI approved?   Yes  Weekly port films approved?   Yes  Change requests noted on port film?   No  Patient setup and plan reviewed?   Yes    Chart Reviewed:  Continue current treatment plan?   Yes  Treatment plan change requested?   No  CBC reviewed?   No  Concurrent Chemo?   No    Objective     Toxicities:    None     Review of Systems   As above    Vitals:    03/09/17 1111   BP: 107/73   Pulse: 69   Temp: 98.1 °F (36.7 °C)   SpO2: 98%       Current Status 2/8/2017   ECOG score 0       Physical Exam  As above      Problem Summary List    Diagnosis:     Diagnosis Plan   1. Malignant neoplasm of upper-outer quadrant of right female breast       Pathology:       Past Medical History   Diagnosis Date   • Breast CA    • Depression    • Hyperlipidemia    • Mitral valve regurgitation    • Mood swings    • PONV (postoperative nausea and vomiting)    • Sleep apnea          Past Surgical History   Procedure Laterality Date   • Tubal abdominal ligation     • Thyroid cyst excision     • Percutaneous pinning finger fracture     • Hysterectomy     • Breast biopsy     • Breast lumpectomy with sentinel node biopsy and axillary node dissection Right 12/13/2016     Procedure: RT BREAST LUMPECTOMY WITH SENTINEL NODE BIOPSY & MAMMO NEEDLE LOC;  Surgeon: Matt Troncoso MD;  [facility]: [facility];  Service:          Current Outpatient Prescriptions on File Prior to Visit   Medication Sig Dispense Refill    • anastrozole (ARIMIDEX) 1 MG tablet Take 1 tablet by mouth Daily. 90 tablet 3   • busPIRone (BUSPAR) 10 MG tablet Take 10 mg by mouth Daily As Needed.     • Cholecalciferol (VITAMIN D PO) Take 1 tablet/day by mouth Daily.     • FLUoxetine (PROzac) 40 MG capsule Take 40 mg by mouth Daily.     • rosuvastatin (CRESTOR) 10 MG tablet Take 10 mg by mouth Daily.       No current facility-administered medications on file prior to visit.        Allergies   Allergen Reactions   • Chlorhexidine    • Erythromycin    • Tegaderm Ag Mesh [Silver] Other (See Comments)     SKIN BLISTERS   • Sulfa Antibiotics Rash   • Zithromax [Azithromycin] Rash        Primary care MD:    SCOTT Gimenez    Oncologist:  MARLENE Correa M.D.    Seen and approved by:  Giorgi Cerna Jr., MD  03/09/2017

## 2017-03-10 PROCEDURE — 77412 RADIATION TX DELIVERY LVL 3: CPT | Performed by: RADIOLOGY

## 2017-03-10 PROCEDURE — 77417 THER RADIOLOGY PORT IMAGE(S): CPT | Performed by: RADIOLOGY

## 2017-03-13 ENCOUNTER — RADIATION ONCOLOGY WEEKLY ASSESSMENT (OUTPATIENT)
Dept: RADIATION ONCOLOGY | Facility: CLINIC | Age: 67
End: 2017-03-13

## 2017-03-13 VITALS
SYSTOLIC BLOOD PRESSURE: 121 MMHG | OXYGEN SATURATION: 97 % | WEIGHT: 207 LBS | TEMPERATURE: 97.5 F | DIASTOLIC BLOOD PRESSURE: 72 MMHG | HEIGHT: 66 IN | HEART RATE: 98 BPM | BODY MASS INDEX: 33.27 KG/M2

## 2017-03-13 DIAGNOSIS — C50.411 MALIGNANT NEOPLASM OF UPPER-OUTER QUADRANT OF RIGHT FEMALE BREAST (HCC): ICD-10-CM

## 2017-03-13 PROCEDURE — 77412 RADIATION TX DELIVERY LVL 3: CPT | Performed by: RADIOLOGY

## 2017-03-13 NOTE — PROGRESS NOTES
"Diagnosis:   Breast CA            Pathologic: Stage IA (T1c, N0, cM0)    Reason for Visit: Radiation (15/33)    Concurrent Chemo:   No    Notes on Treatment course, Films, Medical progress and Plan:  Doing well. No problems or questions, cont on.    ROS:   Constitutional - Normal - Denies lack of appetite, fatigue, fever, night sweats and change in weight.  Neck - Normal - Denies neck masses, muscle weakness, neck pain, decreased range of motion and swelling of the neck.  Breasts - Normal - Denies breast masses, nipple discharge, nipple inversion and pain.  Respiratory - Normal - Denies cough, dyspnea, hemoptysis, hiccoughs, pleuritic chest pain and wheezing.  Gastrointestinal - Normal - Denies abdominal pain, constipation, diarrhea, heartburn / dyspepsia, hematemesis, hemorrhoids, melena / GI bleeding, nausea, pain / cramping, satiety and vomiting.  Musculoskeletal - Normal - Denies arthritis, bone pain, joint pain, muscle weakness and decreased range of motion.   Hematologic/Lymphatic - Normal - Denies easy bruising and tender or enlarged lymph nodes.    PYHSICAL EXAM:  Vitals:    03/13/17 1046   BP: 121/72   Pulse: 98   Temp: 97.5 °F (36.4 °C)   TempSrc: Oral   SpO2: 97%   Weight: 207 lb (93.9 kg)   Height: 66\" (167.6 cm)   PainSc: 0-No pain       Constitutional - Normal - No evidence of impaired alertness, inadequate appearance, premature or advanced chronologic age, uncooperativeness, altered mood and affect and disorientation.  Neck - Normal - No evidence of tender or enlarged lymph nodes, neck abnormalities, restricted range of motion and enlarged thyroid gland.  Breasts - Normal - No change in nipple or incision site.  Chest - Normal - No evidence of chest abnormalities and tender or enlarged lymph nodes.  Hematologic/Lymphatic -  Normal - No evidence of tender or enlarged axillae lymph nodes and tender or enlarged neck lymph nodes.    Performance Status: (1) Restricted in physically strenuous activity, " "ambulatory and able to do work of light nature  Problem added:  No problems updated.  Medications added: No orders of the defined types were placed in this encounter.    Ancillary referrals made: No orders of the defined types were placed in this encounter.      Technical aspects reviewed:  Weekly OBI approved if applicable? Yes  Weekly port films approved?   Yes  Change requests noted if applicable? Yes  Patient setup and plan reviewed?  Yes    Chart Reviewed:  Continue current treatment plan?   Yes  Treatment plan change requested?  No    I have reviewed and marked as \"reviewed\" the current medications, allergies and problem list in the patients EMR.    I have reviewed the patient's medical, surgical  history in detail, reviewed any pertinent lab work  and updated the computerized patient record if needed.    Patient's Care Team:  Patient Care Team:  SCOTT Gimenez as PCP - General (Physician Assistant)  Jaime Puente MD as Consulting Physician (Cardiology)  Aaron Correa MD as Consulting Physician (Hematology and Oncology)  AMANDA Craven as Consulting Physician (Obstetrics and Gynecology)  Matt Troncoso MD as Referring Physician (General Surgery)  AMANDA Craven as Referring Physician (Obstetrics and Gynecology)    Seen and approved by:  Tejal Hayes MD, 02/28/2017  "

## 2017-03-14 PROCEDURE — 77412 RADIATION TX DELIVERY LVL 3: CPT | Performed by: RADIOLOGY

## 2017-03-14 PROCEDURE — 77427 RADIATION TX MANAGEMENT X5: CPT | Performed by: RADIOLOGY

## 2017-03-15 PROCEDURE — 77412 RADIATION TX DELIVERY LVL 3: CPT | Performed by: RADIOLOGY

## 2017-03-16 PROCEDURE — 77336 RADIATION PHYSICS CONSULT: CPT | Performed by: RADIOLOGY

## 2017-03-16 PROCEDURE — 77412 RADIATION TX DELIVERY LVL 3: CPT | Performed by: RADIOLOGY

## 2017-03-16 PROCEDURE — 77417 THER RADIOLOGY PORT IMAGE(S): CPT | Performed by: RADIOLOGY

## 2017-03-17 PROCEDURE — 77412 RADIATION TX DELIVERY LVL 3: CPT | Performed by: RADIOLOGY

## 2017-03-17 PROCEDURE — 77417 THER RADIOLOGY PORT IMAGE(S): CPT | Performed by: RADIOLOGY

## 2017-03-20 PROCEDURE — 77412 RADIATION TX DELIVERY LVL 3: CPT | Performed by: RADIOLOGY

## 2017-03-21 PROCEDURE — 77412 RADIATION TX DELIVERY LVL 3: CPT | Performed by: RADIOLOGY

## 2017-03-21 PROCEDURE — 77427 RADIATION TX MANAGEMENT X5: CPT | Performed by: RADIOLOGY

## 2017-03-22 PROCEDURE — 77412 RADIATION TX DELIVERY LVL 3: CPT | Performed by: RADIOLOGY

## 2017-03-23 ENCOUNTER — RADIATION ONCOLOGY WEEKLY ASSESSMENT (OUTPATIENT)
Dept: RADIATION ONCOLOGY | Facility: CLINIC | Age: 67
End: 2017-03-23

## 2017-03-23 VITALS
HEIGHT: 66 IN | WEIGHT: 207 LBS | SYSTOLIC BLOOD PRESSURE: 110 MMHG | OXYGEN SATURATION: 97 % | HEART RATE: 72 BPM | BODY MASS INDEX: 33.27 KG/M2 | DIASTOLIC BLOOD PRESSURE: 72 MMHG | TEMPERATURE: 97.8 F

## 2017-03-23 DIAGNOSIS — C50.411 MALIGNANT NEOPLASM OF UPPER-OUTER QUADRANT OF RIGHT FEMALE BREAST (HCC): Primary | ICD-10-CM

## 2017-03-23 PROCEDURE — 77412 RADIATION TX DELIVERY LVL 3: CPT | Performed by: RADIOLOGY

## 2017-03-23 PROCEDURE — 77417 THER RADIOLOGY PORT IMAGE(S): CPT | Performed by: RADIOLOGY

## 2017-03-23 PROCEDURE — 77336 RADIATION PHYSICS CONSULT: CPT | Performed by: RADIOLOGY

## 2017-03-23 NOTE — PROGRESS NOTES
"Diagnosis:   Breast CA            Pathologic: Stage IA (T1c, N0, cM0)    Reason for Visit: Radiation (23/33)    Concurrent Chemo:   No    Notes on Treatment course, Films, Medical progress and Plan:  Doing well. No problems or questions, cont on.    ROS:   Constitutional - Normal - Denies lack of appetite, fatigue, fever, night sweats and change in weight.  Neck - Normal - Denies neck masses, muscle weakness, neck pain, decreased range of motion and swelling of the neck.  Breasts - Normal - Denies breast masses, nipple discharge, nipple inversion and pain.  Respiratory - Normal - Denies cough, dyspnea, hemoptysis, hiccoughs, pleuritic chest pain and wheezing.  Gastrointestinal - Normal - Denies abdominal pain, constipation, diarrhea, heartburn / dyspepsia, hematemesis, hemorrhoids, melena / GI bleeding, nausea, pain / cramping, satiety and vomiting.  Musculoskeletal - Normal - Denies arthritis, bone pain, joint pain, muscle weakness and decreased range of motion.   Hematologic/Lymphatic - Normal - Denies easy bruising and tender or enlarged lymph nodes.    PYHSICAL EXAM:  Vitals:    03/23/17 1051   BP: 110/72   Pulse: 72   Temp: 97.8 °F (36.6 °C)   TempSrc: Oral   SpO2: 97%   Weight: 207 lb (93.9 kg)   Height: 66\" (167.6 cm)       Constitutional - Normal - No evidence of impaired alertness, inadequate appearance, premature or advanced chronologic age, uncooperativeness, altered mood and affect and disorientation.  Neck - Normal - No evidence of tender or enlarged lymph nodes, neck abnormalities, restricted range of motion and enlarged thyroid gland.  Breasts - Normal - No change in nipple or incision site.  Chest - Normal - No evidence of chest abnormalities and tender or enlarged lymph nodes.  Hematologic/Lymphatic -  Normal - No evidence of tender or enlarged axillae lymph nodes and tender or enlarged neck lymph nodes.    Performance Status: (1) Restricted in physically strenuous activity, ambulatory and able to " "do work of light nature  Problem added:  No problems updated.  Medications added: No orders of the defined types were placed in this encounter.    Ancillary referrals made: No orders of the defined types were placed in this encounter.      Technical aspects reviewed:  Weekly OBI approved if applicable? Yes  Weekly port films approved?   Yes  Change requests noted if applicable? Yes  Patient setup and plan reviewed?  Yes    Chart Reviewed:  Continue current treatment plan?   Yes  Treatment plan change requested?  No    I have reviewed and marked as \"reviewed\" the current medications, allergies and problem list in the patients EMR.    I have reviewed the patient's medical, surgical  history in detail, reviewed any pertinent lab work  and updated the computerized patient record if needed.    Patient's Care Team:  Patient Care Team:  SCOTT Gimenez as PCP - General (Physician Assistant)  Jaime Puente MD as Consulting Physician (Cardiology)  Aaron Correa MD as Consulting Physician (Hematology and Oncology)  AMANDA Craven as Consulting Physician (Obstetrics and Gynecology)  Matt Troncoso MD as Referring Physician (General Surgery)  AMANDA Craven as Referring Physician (Obstetrics and Gynecology)    Seen and approved by:  Tejal Hayes MD, 02/28/2017  "

## 2017-03-24 PROCEDURE — 77412 RADIATION TX DELIVERY LVL 3: CPT | Performed by: RADIOLOGY

## 2017-03-24 PROCEDURE — 77417 THER RADIOLOGY PORT IMAGE(S): CPT | Performed by: RADIOLOGY

## 2017-03-27 PROCEDURE — 77412 RADIATION TX DELIVERY LVL 3: CPT | Performed by: RADIOLOGY

## 2017-03-28 PROCEDURE — 77412 RADIATION TX DELIVERY LVL 3: CPT | Performed by: RADIOLOGY

## 2017-03-28 PROCEDURE — 77300 RADIATION THERAPY DOSE PLAN: CPT | Performed by: RADIOLOGY

## 2017-03-28 PROCEDURE — 77427 RADIATION TX MANAGEMENT X5: CPT | Performed by: RADIOLOGY

## 2017-03-29 ENCOUNTER — RADIATION ONCOLOGY WEEKLY ASSESSMENT (OUTPATIENT)
Dept: RADIATION ONCOLOGY | Facility: CLINIC | Age: 67
End: 2017-03-29

## 2017-03-29 VITALS
WEIGHT: 208 LBS | OXYGEN SATURATION: 97 % | SYSTOLIC BLOOD PRESSURE: 123 MMHG | HEIGHT: 66 IN | BODY MASS INDEX: 33.43 KG/M2 | TEMPERATURE: 97.7 F | DIASTOLIC BLOOD PRESSURE: 68 MMHG | HEART RATE: 65 BPM

## 2017-03-29 DIAGNOSIS — C50.411 MALIGNANT NEOPLASM OF UPPER-OUTER QUADRANT OF RIGHT FEMALE BREAST (HCC): Primary | ICD-10-CM

## 2017-03-29 PROCEDURE — 77412 RADIATION TX DELIVERY LVL 3: CPT | Performed by: RADIOLOGY

## 2017-03-29 NOTE — PROGRESS NOTES
"Diagnosis:   Breast CA            Pathologic: Stage IA (T1c, N0, cM0)    Reason for Visit: Radiation (27/33)    Concurrent Chemo:   No    Notes on Treatment course, Films, Medical progress and Plan:  Doing well. Mild erythema laterally and undersurface but no desquam. Skin care reviewed. No problems or questions, will see back in 4 weeks and cont on.    ROS:   Constitutional - Normal - Denies lack of appetite, fatigue, fever, night sweats and change in weight.  Neck - Normal - Denies neck masses, muscle weakness, neck pain, decreased range of motion and swelling of the neck.  Breasts - Normal - Denies breast masses, nipple discharge, nipple inversion and pain.  Respiratory - Normal - Denies cough, dyspnea, hemoptysis, hiccoughs, pleuritic chest pain and wheezing.  Gastrointestinal - Normal - Denies abdominal pain, constipation, diarrhea, heartburn / dyspepsia, hematemesis, hemorrhoids, melena / GI bleeding, nausea, pain / cramping, satiety and vomiting.  Musculoskeletal - Normal - Denies arthritis, bone pain, joint pain, muscle weakness and decreased range of motion.   Hematologic/Lymphatic - Normal - Denies easy bruising and tender or enlarged lymph nodes.    PYHSICAL EXAM:  Vitals:    03/29/17 1045   BP: 123/68   Pulse: 65   Temp: 97.7 °F (36.5 °C)   TempSrc: Oral   SpO2: 97%   Weight: 208 lb (94.3 kg)   Height: 66\" (167.6 cm)   PainSc: 0-No pain       Constitutional - Normal - No evidence of impaired alertness, inadequate appearance, premature or advanced chronologic age, uncooperativeness, altered mood and affect and disorientation.  Neck - Normal - No evidence of tender or enlarged lymph nodes, neck abnormalities, restricted range of motion and enlarged thyroid gland.  Breasts - Normal - No change in nipple or incision site.  Chest - Normal - No evidence of chest abnormalities and tender or enlarged lymph nodes.  Hematologic/Lymphatic -  Normal - No evidence of tender or enlarged axillae lymph nodes and tender " "or enlarged neck lymph nodes.    Performance Status: (1) Restricted in physically strenuous activity, ambulatory and able to do work of light nature  Problem added:  No problems updated.  Medications added: No orders of the defined types were placed in this encounter.    Ancillary referrals made: No orders of the defined types were placed in this encounter.      Technical aspects reviewed:  Weekly OBI approved if applicable? Yes  Weekly port films approved?   Yes  Change requests noted if applicable? Yes  Patient setup and plan reviewed?  Yes    Chart Reviewed:  Continue current treatment plan?   Yes  Treatment plan change requested?  No    I have reviewed and marked as \"reviewed\" the current medications, allergies and problem list in the patients EMR.    I have reviewed the patient's medical, surgical  history in detail, reviewed any pertinent lab work  and updated the computerized patient record if needed.    Patient's Care Team:  Patient Care Team:  SCOTT Gimenez as PCP - General (Physician Assistant)  Jaime Puente MD as Consulting Physician (Cardiology)  Aaron Correa MD as Consulting Physician (Hematology and Oncology)  AMANDA Craven as Consulting Physician (Obstetrics and Gynecology)  Matt Troncoso MD as Referring Physician (General Surgery)  AMANDA Craven as Referring Physician (Obstetrics and Gynecology)    Seen and approved by:  Tejal Hayes MD, 02/28/2017  "

## 2017-03-30 PROCEDURE — 77412 RADIATION TX DELIVERY LVL 3: CPT | Performed by: RADIOLOGY

## 2017-03-30 PROCEDURE — 77336 RADIATION PHYSICS CONSULT: CPT | Performed by: RADIOLOGY

## 2017-03-30 PROCEDURE — 77417 THER RADIOLOGY PORT IMAGE(S): CPT | Performed by: RADIOLOGY

## 2017-03-31 PROCEDURE — 77412 RADIATION TX DELIVERY LVL 3: CPT | Performed by: RADIOLOGY

## 2017-03-31 PROCEDURE — 77280 THER RAD SIMULAJ FIELD SMPL: CPT | Performed by: RADIOLOGY

## 2017-03-31 PROCEDURE — 77417 THER RADIOLOGY PORT IMAGE(S): CPT | Performed by: RADIOLOGY

## 2017-03-31 PROCEDURE — 77334 RADIATION TREATMENT AID(S): CPT | Performed by: RADIOLOGY

## 2017-04-03 ENCOUNTER — APPOINTMENT (OUTPATIENT)
Dept: RADIATION ONCOLOGY | Facility: HOSPITAL | Age: 67
End: 2017-04-03

## 2017-04-04 PROCEDURE — 77412 RADIATION TX DELIVERY LVL 3: CPT | Performed by: RADIOLOGY

## 2017-04-04 PROCEDURE — 77417 THER RADIOLOGY PORT IMAGE(S): CPT | Performed by: RADIOLOGY

## 2017-04-05 PROCEDURE — 77412 RADIATION TX DELIVERY LVL 3: CPT | Performed by: RADIOLOGY

## 2017-04-05 PROCEDURE — 77427 RADIATION TX MANAGEMENT X5: CPT | Performed by: RADIOLOGY

## 2017-04-06 ENCOUNTER — RADIATION ONCOLOGY WEEKLY ASSESSMENT (OUTPATIENT)
Dept: RADIATION ONCOLOGY | Facility: CLINIC | Age: 67
End: 2017-04-06

## 2017-04-06 VITALS
HEIGHT: 66 IN | HEART RATE: 69 BPM | BODY MASS INDEX: 33.75 KG/M2 | WEIGHT: 210 LBS | TEMPERATURE: 98 F | OXYGEN SATURATION: 99 %

## 2017-04-06 DIAGNOSIS — C50.411 MALIGNANT NEOPLASM OF UPPER-OUTER QUADRANT OF RIGHT FEMALE BREAST (HCC): Primary | ICD-10-CM

## 2017-04-06 PROCEDURE — 77412 RADIATION TX DELIVERY LVL 3: CPT | Performed by: RADIOLOGY

## 2017-04-06 PROCEDURE — 77336 RADIATION PHYSICS CONSULT: CPT | Performed by: RADIOLOGY

## 2017-04-06 NOTE — PROGRESS NOTES
Physician Weekly Management Note    Diagnosis:     Diagnosis Plan   1. Malignant neoplasm of upper-outer quadrant of right female breast         RT Details:   Treatment #32/33    Notes on Treatment course, Films, Medical progress:      Reports no treatment-related issues.  She has had daily headache for the past week or so, which is unusual for her.  It might be related to the positioning on the table or the head holding device or something entirely unrelated.  I reassured her that it was not a side effect of radiation treatment itself. She denies any other neurological signs or symptoms. She will see Dr. Hayes back in follow-up in about 5 weeks.    Weekly Management:  Medication reviewed?   Yes  New medications given?   No  Problemlist reviewed?   Yes  Problem added?   No      Technical aspects reviewed:  Weekly OBI approved?   Yes  Weekly port films approved?   Yes  Change requests noted on port film?   No  Patient setup and plan reviewed?   Yes    Chart Reviewed:  Continue current treatment plan?   Yes  Treatment plan change requested?   No  CBC reviewed?   No  Concurrent Chemo?   No    Objective     Toxicities:    As above     Review of Systems   As above    There were no vitals filed for this visit.    Current Status 2/8/2017   ECOG score 0       Physical Exam  As above      Problem Summary List    Diagnosis:     Diagnosis Plan   1. Malignant neoplasm of upper-outer quadrant of right female breast       Pathology:       Past Medical History:   Diagnosis Date   • Breast CA    • Depression    • Hyperlipidemia    • Mitral valve regurgitation    • Mood swings    • PONV (postoperative nausea and vomiting)    • Sleep apnea          Past Surgical History:   Procedure Laterality Date   • BREAST BIOPSY     • BREAST LUMPECTOMY WITH SENTINEL NODE BIOPSY AND AXILLARY NODE DISSECTION Right 12/13/2016    Procedure: RT BREAST LUMPECTOMY WITH SENTINEL NODE BIOPSY & MAMMO NEEDLE LOC;  Surgeon: Matt Troncoso MD;  Location:   [facility];  Service:    • HYSTERECTOMY     • PERCUTANEOUS PINNING FINGER FRACTURE     • THYROID CYST EXCISION     • TUBAL ABDOMINAL LIGATION           Current Outpatient Prescriptions on File Prior to Visit   Medication Sig Dispense Refill   • anastrozole (ARIMIDEX) 1 MG tablet Take 1 tablet by mouth Daily. 90 tablet 3   • busPIRone (BUSPAR) 10 MG tablet Take 10 mg by mouth Daily As Needed.     • Cholecalciferol (VITAMIN D PO) Take 1 tablet/day by mouth Daily.     • FLUoxetine (PROzac) 40 MG capsule Take 40 mg by mouth Daily.     • rosuvastatin (CRESTOR) 10 MG tablet Take 10 mg by mouth Daily.       No current facility-administered medications on file prior to visit.        Allergies   Allergen Reactions   • Chlorhexidine    • Erythromycin    • Tegaderm Ag Mesh [Silver] Other (See Comments)     SKIN BLISTERS   • Sulfa Antibiotics Rash   • Zithromax [Azithromycin] Rash       Primary care MD:    SCOTT Gimenez    Oncologist:  MARLENE Correa M.D.    Seen and approved by:  Giorgi Cerna Jr., MD  04/06/2017

## 2017-04-07 PROCEDURE — 77412 RADIATION TX DELIVERY LVL 3: CPT | Performed by: RADIOLOGY

## 2017-04-28 ENCOUNTER — DOCUMENTATION (OUTPATIENT)
Dept: RADIATION ONCOLOGY | Facility: HOSPITAL | Age: 67
End: 2017-04-28

## 2017-04-28 DIAGNOSIS — C50.411 MALIGNANT NEOPLASM OF UPPER-OUTER QUADRANT OF RIGHT FEMALE BREAST (HCC): Primary | ICD-10-CM

## 2017-04-28 NOTE — PROGRESS NOTES
RADIATION THERAPY TREATMENT SUMMARY    DIAGNOSIS:   Breast CA       Pathologic: Stage IA (T1c, N0, cM0)     Patient Care Team:  SCOTT Gimenez as PCP - General (Physician Assistant)  Jaime Puente MD as Consulting Physician (Cardiology)  Aaron Correa MD as Consulting Physician (Hematology and Oncology)  AMANDA Craven as Consulting Physician (Obstetrics and Gynecology)  Matt Troncoso MD as Referring Physician (General Surgery)  AMANDA Craven as Referring Physician (Obstetrics and Gynecology)    The patient is a 66 y.o. year old female who has recently completed radiation therapy treatment for the above mentioned diagnosis.     Please see the specifics of the course of treatment below.    Dates of treatment:  2/21/2017 - 4/7/2017      Details of radiation therapy:  Treatment Site - Right Breast  Treatment Intent - Curative, Postoperative  Total Dose in cGy - 5040  Number of Treatments - 28  Dose per fraction - 180 cGy per fraction  Fractions per day - 1 fx/day  Fractions per week - 5 fx/week  Treatment Type - Tangents, Electronic Compensation (EC), 3D  Energy - 6 MVP, 18 MVP  Normalization - Calc point, Prescribed at 100%    Treatment Site - Right Tumor Bed Boost  Treatment Intent - Curative, Postoperative  Total Dose in cGy - 1000  Number of Treatments - 5  Dose per fraction - 200 cGy per fraction  Fractions per day - 1 fx/day  Fractions per week - 5 fx/week  Treatment Type - 3 fields , 3D  Energy - 6 MVP, 18 MVP  Normalization - Calc point, See below  I  Tolerance and Toxicities:  she tolerated the treatments very well, with no significant problems other than the mild expected skin reaction. she required no treatment breaks and the above course of treatments was completed in 45 elapsed days    F/U plans:  I have asked her to return to see me in 4  weeks and referred her to our survivorship clinic.

## 2017-05-08 ENCOUNTER — LAB (OUTPATIENT)
Dept: OTHER | Facility: HOSPITAL | Age: 67
End: 2017-05-08

## 2017-05-08 ENCOUNTER — APPOINTMENT (OUTPATIENT)
Dept: OTHER | Facility: HOSPITAL | Age: 67
End: 2017-05-08

## 2017-05-08 ENCOUNTER — OFFICE VISIT (OUTPATIENT)
Dept: ONCOLOGY | Facility: CLINIC | Age: 67
End: 2017-05-08

## 2017-05-08 VITALS
BODY MASS INDEX: 33.09 KG/M2 | TEMPERATURE: 97.8 F | WEIGHT: 210.8 LBS | OXYGEN SATURATION: 95 % | SYSTOLIC BLOOD PRESSURE: 132 MMHG | RESPIRATION RATE: 16 BRPM | DIASTOLIC BLOOD PRESSURE: 77 MMHG | HEART RATE: 80 BPM | HEIGHT: 67 IN

## 2017-05-08 DIAGNOSIS — C50.411 MALIGNANT NEOPLASM OF UPPER-OUTER QUADRANT OF RIGHT FEMALE BREAST (HCC): Primary | ICD-10-CM

## 2017-05-08 DIAGNOSIS — C50.919 MALIGNANT NEOPLASM OF FEMALE BREAST, UNSPECIFIED LATERALITY, UNSPECIFIED SITE OF BREAST: Primary | ICD-10-CM

## 2017-05-08 DIAGNOSIS — C50.411 MALIGNANT NEOPLASM OF UPPER-OUTER QUADRANT OF RIGHT FEMALE BREAST (HCC): ICD-10-CM

## 2017-05-08 LAB
ALBUMIN SERPL-MCNC: 4.2 G/DL (ref 3.5–5.2)
ALBUMIN/GLOB SERPL: 1.4 G/DL
ALP SERPL-CCNC: 98 U/L (ref 39–117)
ALT SERPL W P-5'-P-CCNC: 14 U/L (ref 1–33)
ANION GAP SERPL CALCULATED.3IONS-SCNC: 13.3 MMOL/L
AST SERPL-CCNC: 17 U/L (ref 1–32)
BASOPHILS # BLD AUTO: 0.03 10*3/MM3 (ref 0–0.2)
BASOPHILS NFR BLD AUTO: 0.5 % (ref 0–1.5)
BILIRUB SERPL-MCNC: 0.4 MG/DL (ref 0.1–1.2)
BUN BLD-MCNC: 13 MG/DL (ref 8–23)
BUN/CREAT SERPL: 20.3 (ref 7–25)
CALCIUM SPEC-SCNC: 9.2 MG/DL (ref 8.6–10.5)
CHLORIDE SERPL-SCNC: 104 MMOL/L (ref 98–107)
CO2 SERPL-SCNC: 23.7 MMOL/L (ref 22–29)
CREAT BLD-MCNC: 0.64 MG/DL (ref 0.57–1)
DEPRECATED RDW RBC AUTO: 44.8 FL (ref 37–54)
EOSINOPHIL # BLD AUTO: 0.11 10*3/MM3 (ref 0–0.7)
EOSINOPHIL NFR BLD AUTO: 2 % (ref 0.3–6.2)
ERYTHROCYTE [DISTWIDTH] IN BLOOD BY AUTOMATED COUNT: 13.4 % (ref 11.7–13)
GFR SERPL CREATININE-BSD FRML MDRD: 92 ML/MIN/1.73
GLOBULIN UR ELPH-MCNC: 3 GM/DL
GLUCOSE BLD-MCNC: 95 MG/DL (ref 65–99)
HCT VFR BLD AUTO: 39.6 % (ref 35.6–45.5)
HGB BLD-MCNC: 13 G/DL (ref 11.9–15.5)
HOLD SPECIMEN: NORMAL
IMM GRANULOCYTES # BLD: 0.02 10*3/MM3 (ref 0–0.03)
IMM GRANULOCYTES NFR BLD: 0.4 % (ref 0–0.5)
LYMPHOCYTES # BLD AUTO: 1.68 10*3/MM3 (ref 0.9–4.8)
LYMPHOCYTES NFR BLD AUTO: 30.5 % (ref 19.6–45.3)
MCH RBC QN AUTO: 29.7 PG (ref 26.9–32)
MCHC RBC AUTO-ENTMCNC: 32.8 G/DL (ref 32.4–36.3)
MCV RBC AUTO: 90.6 FL (ref 80.5–98.2)
MONOCYTES # BLD AUTO: 0.42 10*3/MM3 (ref 0.2–1.2)
MONOCYTES NFR BLD AUTO: 7.6 % (ref 5–12)
NEUTROPHILS # BLD AUTO: 3.24 10*3/MM3 (ref 1.9–8.1)
NEUTROPHILS NFR BLD AUTO: 59 % (ref 42.7–76)
NRBC BLD MANUAL-RTO: 0 /100 WBC (ref 0–0)
PLATELET # BLD AUTO: 301 10*3/MM3 (ref 140–500)
PMV BLD AUTO: 9.5 FL (ref 6–12)
POTASSIUM BLD-SCNC: 4 MMOL/L (ref 3.5–5.2)
PROT SERPL-MCNC: 7.2 G/DL (ref 6–8.5)
RBC # BLD AUTO: 4.37 10*6/MM3 (ref 3.9–5.2)
SODIUM BLD-SCNC: 141 MMOL/L (ref 136–145)
WBC NRBC COR # BLD: 5.5 10*3/MM3 (ref 4.5–10.7)

## 2017-05-08 PROCEDURE — 36415 COLL VENOUS BLD VENIPUNCTURE: CPT

## 2017-05-08 PROCEDURE — 80053 COMPREHEN METABOLIC PANEL: CPT | Performed by: INTERNAL MEDICINE

## 2017-05-08 PROCEDURE — 85025 COMPLETE CBC W/AUTO DIFF WBC: CPT | Performed by: INTERNAL MEDICINE

## 2017-05-08 PROCEDURE — 99213 OFFICE O/P EST LOW 20 MIN: CPT | Performed by: INTERNAL MEDICINE

## 2017-05-10 ENCOUNTER — OFFICE VISIT (OUTPATIENT)
Dept: RADIATION ONCOLOGY | Facility: CLINIC | Age: 67
End: 2017-05-10

## 2017-05-10 DIAGNOSIS — C50.919 MALIGNANT NEOPLASM OF FEMALE BREAST, UNSPECIFIED LATERALITY, UNSPECIFIED SITE OF BREAST: Primary | ICD-10-CM

## 2017-05-10 PROCEDURE — 99024 POSTOP FOLLOW-UP VISIT: CPT | Performed by: RADIOLOGY

## 2017-07-03 ENCOUNTER — TELEPHONE (OUTPATIENT)
Dept: OTHER | Facility: HOSPITAL | Age: 67
End: 2017-07-03

## 2017-07-03 NOTE — TELEPHONE ENCOUNTER
Call placed to patient RE: offer appointment for survivorship clinic and/or treatment summary.  Michaelle Britt from our office spoke to the patient and reviewed purpose and goals of survivorship visit as well as what to expect. Patient declined at present due to lack of interest. We offered to mail materials and contact information should she reconsider and she is agreeable to this. Survivorship info mailed.

## 2017-07-11 ENCOUNTER — CLINICAL SUPPORT (OUTPATIENT)
Dept: OTHER | Facility: HOSPITAL | Age: 67
End: 2017-07-11

## 2017-07-11 DIAGNOSIS — C50.411 MALIGNANT NEOPLASM OF UPPER-OUTER QUADRANT OF RIGHT FEMALE BREAST (HCC): Primary | ICD-10-CM

## 2017-07-11 PROCEDURE — G0463 HOSPITAL OUTPT CLINIC VISIT: HCPCS

## 2017-07-18 ENCOUNTER — CLINICAL SUPPORT (OUTPATIENT)
Dept: OTHER | Facility: HOSPITAL | Age: 67
End: 2017-07-18

## 2017-07-18 DIAGNOSIS — C50.411 MALIGNANT NEOPLASM OF UPPER-OUTER QUADRANT OF RIGHT FEMALE BREAST (HCC): Primary | ICD-10-CM

## 2017-07-18 PROCEDURE — 36415 COLL VENOUS BLD VENIPUNCTURE: CPT

## 2017-07-18 NOTE — PROGRESS NOTES
Pt. Here to have blood work drawn for genetic testing. She had her counseling last week, but we needed to obtain the appropriate kit, so she returned today. Labs drawn per left hand using a 21 gauge butterfly. Sterile 2 x 2 applied. 1 lavender tube sent to [facility].

## 2017-09-26 ENCOUNTER — LAB (OUTPATIENT)
Dept: LAB | Facility: HOSPITAL | Age: 67
End: 2017-09-26

## 2017-09-26 ENCOUNTER — OFFICE VISIT (OUTPATIENT)
Dept: ONCOLOGY | Facility: CLINIC | Age: 67
End: 2017-09-26

## 2017-09-26 VITALS
HEIGHT: 67 IN | OXYGEN SATURATION: 98 % | BODY MASS INDEX: 32.62 KG/M2 | WEIGHT: 207.8 LBS | DIASTOLIC BLOOD PRESSURE: 82 MMHG | RESPIRATION RATE: 16 BRPM | HEART RATE: 67 BPM | SYSTOLIC BLOOD PRESSURE: 128 MMHG | TEMPERATURE: 98 F

## 2017-09-26 DIAGNOSIS — C50.919 MALIGNANT NEOPLASM OF FEMALE BREAST, UNSPECIFIED LATERALITY, UNSPECIFIED SITE OF BREAST: Primary | ICD-10-CM

## 2017-09-26 DIAGNOSIS — C50.411 MALIGNANT NEOPLASM OF UPPER-OUTER QUADRANT OF RIGHT FEMALE BREAST (HCC): ICD-10-CM

## 2017-09-26 LAB
ALBUMIN SERPL-MCNC: 4.1 G/DL (ref 3.5–5.2)
ALBUMIN/GLOB SERPL: 1.4 G/DL (ref 1.1–2.4)
ALP SERPL-CCNC: 108 U/L (ref 38–116)
ALT SERPL W P-5'-P-CCNC: 12 U/L (ref 0–33)
ANION GAP SERPL CALCULATED.3IONS-SCNC: 11.5 MMOL/L
AST SERPL-CCNC: 14 U/L (ref 0–32)
BASOPHILS # BLD AUTO: 0.03 10*3/MM3 (ref 0–0.1)
BASOPHILS NFR BLD AUTO: 0.5 % (ref 0–1.1)
BILIRUB SERPL-MCNC: 0.5 MG/DL (ref 0.1–1.2)
BUN BLD-MCNC: 13 MG/DL (ref 6–20)
BUN/CREAT SERPL: 16.9 (ref 7.3–30)
CALCIUM SPEC-SCNC: 9.2 MG/DL (ref 8.5–10.2)
CHLORIDE SERPL-SCNC: 105 MMOL/L (ref 98–107)
CO2 SERPL-SCNC: 24.5 MMOL/L (ref 22–29)
CREAT BLD-MCNC: 0.77 MG/DL (ref 0.6–1.1)
DEPRECATED RDW RBC AUTO: 44.6 FL (ref 37–49)
EOSINOPHIL # BLD AUTO: 0.13 10*3/MM3 (ref 0–0.36)
EOSINOPHIL NFR BLD AUTO: 2.2 % (ref 1–5)
ERYTHROCYTE [DISTWIDTH] IN BLOOD BY AUTOMATED COUNT: 13.2 % (ref 11.7–14.5)
GFR SERPL CREATININE-BSD FRML MDRD: 75 ML/MIN/1.73
GLOBULIN UR ELPH-MCNC: 2.9 GM/DL (ref 1.8–3.5)
GLUCOSE BLD-MCNC: 96 MG/DL (ref 74–124)
HCT VFR BLD AUTO: 40.5 % (ref 34–45)
HGB BLD-MCNC: 13.2 G/DL (ref 11.5–14.9)
IMM GRANULOCYTES # BLD: 0.02 10*3/MM3 (ref 0–0.03)
IMM GRANULOCYTES NFR BLD: 0.3 % (ref 0–0.5)
LYMPHOCYTES # BLD AUTO: 1.72 10*3/MM3 (ref 1–3.5)
LYMPHOCYTES NFR BLD AUTO: 29.8 % (ref 20–49)
MCH RBC QN AUTO: 29.7 PG (ref 27–33)
MCHC RBC AUTO-ENTMCNC: 32.6 G/DL (ref 32–35)
MCV RBC AUTO: 91 FL (ref 83–97)
MONOCYTES # BLD AUTO: 0.5 10*3/MM3 (ref 0.25–0.8)
MONOCYTES NFR BLD AUTO: 8.7 % (ref 4–12)
NEUTROPHILS # BLD AUTO: 3.38 10*3/MM3 (ref 1.5–7)
NEUTROPHILS NFR BLD AUTO: 58.5 % (ref 39–75)
NRBC BLD MANUAL-RTO: 0 /100 WBC (ref 0–0)
PLATELET # BLD AUTO: 305 10*3/MM3 (ref 150–375)
PMV BLD AUTO: 9.3 FL (ref 8.9–12.1)
POTASSIUM BLD-SCNC: 4.6 MMOL/L (ref 3.5–4.7)
PROT SERPL-MCNC: 7 G/DL (ref 6.3–8)
RBC # BLD AUTO: 4.45 10*6/MM3 (ref 3.9–5)
SODIUM BLD-SCNC: 141 MMOL/L (ref 134–145)
WBC NRBC COR # BLD: 5.78 10*3/MM3 (ref 4–10)

## 2017-09-26 PROCEDURE — 99214 OFFICE O/P EST MOD 30 MIN: CPT | Performed by: INTERNAL MEDICINE

## 2017-09-26 PROCEDURE — 36415 COLL VENOUS BLD VENIPUNCTURE: CPT | Performed by: INTERNAL MEDICINE

## 2017-09-26 PROCEDURE — 80053 COMPREHEN METABOLIC PANEL: CPT | Performed by: INTERNAL MEDICINE

## 2017-09-26 PROCEDURE — 85025 COMPLETE CBC W/AUTO DIFF WBC: CPT | Performed by: INTERNAL MEDICINE

## 2017-09-26 RX ORDER — FLUOXETINE HYDROCHLORIDE 20 MG/1
CAPSULE ORAL
COMMUNITY
Start: 2017-09-18 | End: 2019-01-22 | Stop reason: SDUPTHER

## 2017-09-26 RX ORDER — ERGOCALCIFEROL 1.25 MG/1
CAPSULE ORAL
Refills: 1 | COMMUNITY
Start: 2017-08-03 | End: 2019-07-30

## 2017-09-26 NOTE — PROGRESS NOTES
Subjective   REASON FOR FOLLOWUP:    1.Right breast cancer T1c N0 ER/OR positive HER-2 negative  2.  Oncotype score of 20  3.  Radiation and Arimidex planned bone density shows osteopenia-Arimidex started in 4/                               REQUESTING PHYSICIAN:  Trino Troncoso M.D.        History of Present Illness patient is a 58 a lady with a recently diagnosed right breast cancer T1 CN 0 ER/OR positive HER-2 negative here to review her tolerance of Arimidex .  She had a lot of fatigue with the Arimidex and went to see her family doctor  was very depressed and did not want to do anything.  Her family doctor increased her Prozac from 40 mg to 60 mg and she is finally feeling better and doing more around the house .she is due for a DEXA scan as she has not been scans since October 2015 and we will review the results and call her.  Specifically the bone density significantly worse we'll switch to tamoxifen .    She went for genetic testing but she did not meet the criteria for testing and no further workup was done.    We talked again about long-term effects of Arimidex.  Her mood appears to be better with the higher dose of Prozac and BuSpar when necessary.  Her mother-in-law who is very difficult in general is staying with them the last 4 years and this is causing more stress for her but she realizes there is no other option    Past Medical History:   Diagnosis Date   • Breast CA    • Depression    • Hyperlipidemia    • Mitral valve regurgitation    • Mood swings    • PONV (postoperative nausea and vomiting)    • Sleep apnea         Past Surgical History:   Procedure Laterality Date   • BREAST BIOPSY     • BREAST LUMPECTOMY WITH SENTINEL NODE BIOPSY AND AXILLARY NODE DISSECTION Right 12/13/2016    Procedure: RT BREAST LUMPECTOMY WITH SENTINEL NODE BIOPSY & MAMMO NEEDLE LOC;  Surgeon: Matt Troncoso MD;  [place] [facility] [place] [facility] [place]  Service:    • HYSTERECTOMY     • PERCUTANEOUS PINNING FINGER FRACTURE     •  THYROID CYST EXCISION     • TUBAL ABDOMINAL LIGATION        ONCOLOGIC HISTORY:   patient is a 66-year-old lady with a long history of depression hypercholesterolemia who was noted on routine mammogram on 16 to have an abnormality in the right breast at the 12 o'clock position.  This is not seen on a previous mammogram a year ago and this led to a diagnostic mammogram and ultrasound which confirmed a mass 12 o'clock position the right breast measuring about 1 cm by ultrasound with normal appearing right axillary lymph nodes.  A biopsy was done on 13 which revealed a grade 2 infiltrating ductal carcinoma ER/OR strongly positive HER-2 negative and the patient was referred to Dr. Troncoso.  Dr. Troncoso scheduled MRI of both breasts on 16 and this revealed a 1.8 x 1.3 cm mass in the right breast with no other abnormalities in the axilla or in the left breast and the patient was taken to surgery on 16 which time she had a needle localization lumpectomy and sentinel node biopsy.  Final pathology showed a 1.5 x 1.3 cm grade 2 infiltrating ductal carcinoma  With associated DCIS and margins that were clear after reexcision and one negative sentinel node.  Patient is on well since surgery and is here to discuss adjuvant chemotherapy options.  She was  3 para 3 first childbirth was at night age 17 she did not breast-feed her children.  Menarche  at age 11 and menopause at age 45 when she had a hysterectomy and oophorectomy.  She took hormonal therapy for 10 years and stopped in  when her sister had breast cancer she was on birth control pills for at least 20 years prior to her hysterectomy.  She has a sister who developed breast cancer at age 58 and her second cancer  although one of these was DCIS and the other invasive cancer.  She took tamoxifen for 5 years  Is no other breast or ovarian cancer in the family but she has a very limited family as her father was an only child and her mother had 1  brother and she has one sister  Oncotype score returned at 20 and after looking at the risks and benefits of chemotherapy in this subset which I explained was not well understood at this point pending results of the [employer] trial we have opted to give her Arimidex and start radiation.  .      Current Outpatient Prescriptions on File Prior to Visit   Medication Sig Dispense Refill   • anastrozole (ARIMIDEX) 1 MG tablet Take 1 tablet by mouth Daily. 90 tablet 3   • busPIRone (BUSPAR) 10 MG tablet Take 10 mg by mouth Daily As Needed.     • FLUoxetine (PROzac) 40 MG capsule Take 40 mg by mouth Daily.     • [DISCONTINUED] Cholecalciferol (VITAMIN D PO) Take 1 tablet/day by mouth Daily.     • [DISCONTINUED] rosuvastatin (CRESTOR) 10 MG tablet Take 10 mg by mouth Daily.       No current facility-administered medications on file prior to visit.         ALLERGIES:    Allergies   Allergen Reactions   • Tegaderm Ag Mesh [Silver] Other (See Comments)     SKIN BLISTERS  SKIN BLISTERS   • Sulfa Antibiotics Rash   • Zithromax [Azithromycin] Rash        Social History     Social History   • Marital status:      Spouse name: Peter   • Number of children: N/A   • Years of education: High school     Occupational History   •  Retired     Social History Main Topics   • Smoking status: Never Smoker   • Smokeless tobacco: Never Used   • Alcohol use No   • Drug use: No   • Sexual activity: Defer     Other Topics Concern   • None     Social History Narrative        Family History   Problem Relation Age of Onset   • Heart failure Mother    • Colon cancer Father    • Liver cancer Father    • Tuberculosis Father    • Breast cancer Sister 58     DCIS      Father  at 65 of metastatic colon cancer his parents lived to their 80s without cancer mother  at 93 of CHF and had only one brother who had no cancer.  She has only one sister who had breast cancer at age 58 and again at 63 no ovarian cancer in the family  Review of Systems  "  Constitutional: Negative for activity change, appetite change, fatigue and unexpected weight change.   Respiratory: Negative for cough and shortness of breath.    Musculoskeletal: Positive for back pain.        Objective     Vitals:    09/26/17 1053   BP: 128/82   Pulse: 67   Resp: 16   Temp: 98 °F (36.7 °C)   TempSrc: Oral   SpO2: 98%   Weight: 207 lb 12.8 oz (94.3 kg)   Height: 67.17\" (170.6 cm)  Comment: new ht w/shoes   PainSc: 0-No pain     Current Status 9/26/2017   ECOG score 0       Physical Exam    GENERAL:  Well-developed, well-nourished in no acute distress.   SKIN:  Warm, dry without rashes, purpura or petechiae.  EYES:  Pupils equal, round and reactive to light.  EOMs intact.  Conjunctivae normal.  EARS:  Hearing intact.  NOSE:  Septum midline.  No excoriations or nasal discharge.  MOUTH:  Tongue is well-papillated; no stomatitis or ulcers.  Lips normal.  THROAT:  Oropharynx without lesions or exudates.  NECK:  Supple with good range of motion; no thyromegaly or masses, no JVD.  LYMPHATICS:  No cervical, supraclavicular, axillary or inguinal adenopathy.  CHEST:  Lungs clear to auscultation. Good airflow.  BREASTS: Both breasts are benign lumpectomy scar in the right breast is well-healed  CARDIAC:  Regular rate and rhythm without murmurs, rubs or gallops. Normal S1,S2.  ABDOMEN:  Soft, nontender with no hepatosplenomegaly or masses.  EXTREMITIES:  No clubbing, cyanosis or edema.  NEUROLOGICAL:  Cranial Nerves II-XII grossly intact.  No focal neurological deficits.  PSYCHIATRIC:  Normal affect and mood.        RECENT LABS:  Hematology WBC   Date Value Ref Range Status   09/26/2017 5.78 4.00 - 10.00 10*3/mm3 Final     RBC   Date Value Ref Range Status   09/26/2017 4.45 3.90 - 5.00 10*6/mm3 Final     Hemoglobin   Date Value Ref Range Status   09/26/2017 13.2 11.5 - 14.9 g/dL Final     Hematocrit   Date Value Ref Range Status   09/26/2017 40.5 34.0 - 45.0 % Final     Platelets   Date Value Ref Range " Status   09/26/2017 305 150 - 375 10*3/mm3 Final            Assessment/Plan   1.  T1 CN 0M0 ER/NE positive HER-2 negative right-sided breast cancer  2.  Mild mitral regurgitation  3 long history of depression on Prozac-Dose increased recently with improvement  4.  Positive family history of breast cancer in her sister and colon cancer in her father the very small immediate family-saw the genetic counselors but they do not feel testing was appropriate  5.  oncotype score of 20 -Arimidex plus radiation planned       Plan  1.  Continue Arimidex 1 mg daily for 5 years at least  2.  return in 4 months review her tolerance of Arimidex  3.DEXA scan to be done in October and reviewed-we will call her and change medications if  the scan is  significantly worse than 2015

## 2017-11-28 ENCOUNTER — APPOINTMENT (OUTPATIENT)
Dept: WOMENS IMAGING | Facility: HOSPITAL | Age: 67
End: 2017-11-28

## 2017-11-28 PROCEDURE — 77080 DXA BONE DENSITY AXIAL: CPT | Performed by: RADIOLOGY

## 2017-11-28 PROCEDURE — 77063 BREAST TOMOSYNTHESIS BI: CPT | Performed by: RADIOLOGY

## 2017-11-28 PROCEDURE — G0202 SCR MAMMO BI INCL CAD: HCPCS | Performed by: RADIOLOGY

## 2017-11-28 PROCEDURE — 77067 SCR MAMMO BI INCL CAD: CPT | Performed by: RADIOLOGY

## 2017-11-28 PROCEDURE — MDREVIEWSP: Performed by: RADIOLOGY

## 2018-01-16 ENCOUNTER — APPOINTMENT (OUTPATIENT)
Dept: LAB | Facility: HOSPITAL | Age: 68
End: 2018-01-16

## 2018-01-16 ENCOUNTER — APPOINTMENT (OUTPATIENT)
Dept: ONCOLOGY | Facility: CLINIC | Age: 68
End: 2018-01-16

## 2018-02-20 ENCOUNTER — LAB (OUTPATIENT)
Dept: LAB | Facility: HOSPITAL | Age: 68
End: 2018-02-20

## 2018-02-20 ENCOUNTER — OFFICE VISIT (OUTPATIENT)
Dept: ONCOLOGY | Facility: CLINIC | Age: 68
End: 2018-02-20

## 2018-02-20 VITALS
BODY MASS INDEX: 32.52 KG/M2 | DIASTOLIC BLOOD PRESSURE: 80 MMHG | WEIGHT: 207.2 LBS | RESPIRATION RATE: 16 BRPM | HEIGHT: 67 IN | HEART RATE: 82 BPM | TEMPERATURE: 98.6 F | SYSTOLIC BLOOD PRESSURE: 128 MMHG | OXYGEN SATURATION: 96 %

## 2018-02-20 DIAGNOSIS — C50.919 MALIGNANT NEOPLASM OF FEMALE BREAST (HCC): ICD-10-CM

## 2018-02-20 DIAGNOSIS — C50.919 MALIGNANT NEOPLASM OF BREAST IN FEMALE, ESTROGEN RECEPTOR POSITIVE, UNSPECIFIED LATERALITY, UNSPECIFIED SITE OF BREAST (HCC): Primary | ICD-10-CM

## 2018-02-20 DIAGNOSIS — Z17.0 MALIGNANT NEOPLASM OF BREAST IN FEMALE, ESTROGEN RECEPTOR POSITIVE, UNSPECIFIED LATERALITY, UNSPECIFIED SITE OF BREAST (HCC): Primary | ICD-10-CM

## 2018-02-20 LAB
ALBUMIN SERPL-MCNC: 4.1 G/DL (ref 3.5–5.2)
ALBUMIN/GLOB SERPL: 1.3 G/DL (ref 1.1–2.4)
ALP SERPL-CCNC: 112 U/L (ref 38–116)
ALT SERPL W P-5'-P-CCNC: 11 U/L (ref 0–33)
ANION GAP SERPL CALCULATED.3IONS-SCNC: 10.5 MMOL/L
AST SERPL-CCNC: 15 U/L (ref 0–32)
BASOPHILS # BLD AUTO: 0.04 10*3/MM3 (ref 0–0.1)
BASOPHILS NFR BLD AUTO: 0.7 % (ref 0–1.1)
BILIRUB SERPL-MCNC: 0.5 MG/DL (ref 0.1–1.2)
BUN BLD-MCNC: 18 MG/DL (ref 6–20)
BUN/CREAT SERPL: 23.1 (ref 7.3–30)
CALCIUM SPEC-SCNC: 9.7 MG/DL (ref 8.5–10.2)
CHLORIDE SERPL-SCNC: 103 MMOL/L (ref 98–107)
CO2 SERPL-SCNC: 26.5 MMOL/L (ref 22–29)
CREAT BLD-MCNC: 0.78 MG/DL (ref 0.6–1.1)
DEPRECATED RDW RBC AUTO: 45.1 FL (ref 37–49)
EOSINOPHIL # BLD AUTO: 0.13 10*3/MM3 (ref 0–0.36)
EOSINOPHIL NFR BLD AUTO: 2.3 % (ref 1–5)
ERYTHROCYTE [DISTWIDTH] IN BLOOD BY AUTOMATED COUNT: 13.8 % (ref 11.7–14.5)
GFR SERPL CREATININE-BSD FRML MDRD: 73 ML/MIN/1.73
GLOBULIN UR ELPH-MCNC: 3.2 GM/DL (ref 1.8–3.5)
GLUCOSE BLD-MCNC: 98 MG/DL (ref 74–124)
HCT VFR BLD AUTO: 40 % (ref 34–45)
HGB BLD-MCNC: 13.1 G/DL (ref 11.5–14.9)
IMM GRANULOCYTES # BLD: 0.04 10*3/MM3 (ref 0–0.03)
IMM GRANULOCYTES NFR BLD: 0.7 % (ref 0–0.5)
LYMPHOCYTES # BLD AUTO: 1.68 10*3/MM3 (ref 1–3.5)
LYMPHOCYTES NFR BLD AUTO: 29.1 % (ref 20–49)
MCH RBC QN AUTO: 29.3 PG (ref 27–33)
MCHC RBC AUTO-ENTMCNC: 32.8 G/DL (ref 32–35)
MCV RBC AUTO: 89.5 FL (ref 83–97)
MONOCYTES # BLD AUTO: 0.49 10*3/MM3 (ref 0.25–0.8)
MONOCYTES NFR BLD AUTO: 8.5 % (ref 4–12)
NEUTROPHILS # BLD AUTO: 3.39 10*3/MM3 (ref 1.5–7)
NEUTROPHILS NFR BLD AUTO: 58.7 % (ref 39–75)
NRBC BLD MANUAL-RTO: 0 /100 WBC (ref 0–0)
PLATELET # BLD AUTO: 330 10*3/MM3 (ref 150–375)
PMV BLD AUTO: 9 FL (ref 8.9–12.1)
POTASSIUM BLD-SCNC: 4.9 MMOL/L (ref 3.5–4.7)
PROT SERPL-MCNC: 7.3 G/DL (ref 6.3–8)
RBC # BLD AUTO: 4.47 10*6/MM3 (ref 3.9–5)
SODIUM BLD-SCNC: 140 MMOL/L (ref 134–145)
WBC NRBC COR # BLD: 5.77 10*3/MM3 (ref 4–10)

## 2018-02-20 PROCEDURE — 36415 COLL VENOUS BLD VENIPUNCTURE: CPT | Performed by: INTERNAL MEDICINE

## 2018-02-20 PROCEDURE — 85025 COMPLETE CBC W/AUTO DIFF WBC: CPT | Performed by: INTERNAL MEDICINE

## 2018-02-20 PROCEDURE — 99213 OFFICE O/P EST LOW 20 MIN: CPT | Performed by: INTERNAL MEDICINE

## 2018-02-20 PROCEDURE — 80053 COMPREHEN METABOLIC PANEL: CPT | Performed by: INTERNAL MEDICINE

## 2018-02-20 NOTE — PROGRESS NOTES
Subjective   REASON FOR FOLLOWUP:    1.Right breast cancer T1c N0 ER/ND positive HER-2 negative  2.  Oncotype score of 20  3.  Radiation and Arimidex planned bone density shows osteopenia-Arimidex started in 4/17                               REQUESTING PHYSICIAN:  rTino Troncoso M.D.        History of Present Illness patient is a 58 a lady with a recently diagnosed right breast cancer T1 CN 0 ER/ND positive HER-2 negative here to review her tolerance of Arimidex .  She had a lot of fatigue with the Arimidex and went to see her family doctor as she  was very depressed and did not want to do anything.  Her family doctor increased her Prozac from 40 mg to 60 mg and she is finally feeling better and doing more around the house .  Her DEXA scan in November showed improving osteopenia and a mammogram was benign    At this point she appears to be adjusting well to Arimidex and is not having much problem        Past Medical History:   Diagnosis Date   • Breast CA    • Depression    • Hyperlipidemia    • Mitral valve regurgitation    • Mood swings    • PONV (postoperative nausea and vomiting)    • Sleep apnea         Past Surgical History:   Procedure Laterality Date   • BREAST BIOPSY     • BREAST LUMPECTOMY WITH SENTINEL NODE BIOPSY AND AXILLARY NODE DISSECTION Right 12/13/2016    Procedure: RT BREAST LUMPECTOMY WITH SENTINEL NODE BIOPSY & MAMMO NEEDLE LOC;  Surgeon: Matt Troncoso MD;  [place] [facility] [place] [facility] [place]:    • HYSTERECTOMY     • PERCUTANEOUS PINNING FINGER FRACTURE     • THYROID CYST EXCISION     • TUBAL ABDOMINAL LIGATION        ONCOLOGIC HISTORY:   patient is a 66-year-old lady with a long history of depression hypercholesterolemia who was noted on routine mammogram on 11/9/16 to have an abnormality in the right breast at the 12 o'clock position.  This is not seen on a previous mammogram a year ago and this led to a diagnostic mammogram and ultrasound which confirmed a mass 12 o'clock position the right  breast measuring about 1 cm by ultrasound with normal appearing right axillary lymph nodes.  A biopsy was done on 13 which revealed a grade 2 infiltrating ductal carcinoma ER/[place] strongly positive HER-2 negative and the patient was referred to Dr. Troncoso.  Dr. Troncoso scheduled MRI of both breasts on 16 and this revealed a 1.8 x 1.3 cm mass in the right breast with no other abnormalities in the axilla or in the left breast and the patient was taken to surgery on 16 which time she had a needle localization lumpectomy and sentinel node biopsy.  Final pathology showed a 1.5 x 1.3 cm grade 2 infiltrating ductal carcinoma  With associated DCIS and margins that were clear after reexcision and one negative sentinel node.  Patient is on well since surgery and is here to discuss adjuvant chemotherapy options.  She was  3 para 3 first childbirth was at night age 17 she did not breast-feed her children.  Menarche  at age 11 and menopause at age 45 when she had a hysterectomy and oophorectomy.  She took hormonal therapy for 10 years and stopped in  when her sister had breast cancer she was on birth control pills for at least 20 years prior to her hysterectomy.  She has a sister who developed breast cancer at age 58 and her second cancer  although one of these was DCIS and the other invasive cancer.  She took tamoxifen for 5 years  Is no other breast or ovarian cancer in the family but she has a very limited family as her father was an only child and her mother had 1 brother and she has one sister  Oncotype score returned at 20 and after looking at the risks and benefits of chemotherapy in this subset which I explained was not well understood at this point pending results of the [employer] trial we have opted to give her Arimidex and start radiation.  .    she went for genetic testing but she did not meet the criteria for testing and no further workup was done.  We talked again about long-term effects  of Arimidex.  Her mood appears to be better with the higher dose of Prozac and BuSpar when necessary.  Her mother-in-law who is very difficult in general is staying with them the last 4 years and this is causing more stress for her but she realizes there is no other option        Current Outpatient Prescriptions on File Prior to Visit   Medication Sig Dispense Refill   • anastrozole (ARIMIDEX) 1 MG tablet Take 1 tablet by mouth Daily. 90 tablet 3   • busPIRone (BUSPAR) 10 MG tablet Take 10 mg by mouth Daily As Needed.     • FLUoxetine (PROzac) 20 MG capsule      • FLUoxetine (PROzac) 40 MG capsule Take 40 mg by mouth Daily.     • vitamin D (ERGOCALCIFEROL) 35550 units capsule capsule TAKE 1 CAPSULE WEEKLY.  1     No current facility-administered medications on file prior to visit.         ALLERGIES:    Allergies   Allergen Reactions   • Tegaderm Ag Mesh [Silver] Other (See Comments)     SKIN BLISTERS  SKIN BLISTERS   • Sulfa Antibiotics Rash   • Zithromax [Azithromycin] Rash        Social History     Social History   • Marital status:      Spouse name: Peter   • Number of children: N/A   • Years of education: High school     Occupational History   •  Retired     Social History Main Topics   • Smoking status: Never Smoker   • Smokeless tobacco: Never Used   • Alcohol use No   • Drug use: No   • Sexual activity: Defer     Other Topics Concern   • None     Social History Narrative        Family History   Problem Relation Age of Onset   • Heart failure Mother    • Colon cancer Father    • Liver cancer Father    • Tuberculosis Father    • Breast cancer Sister 58     DCIS      Father  at 65 of metastatic colon cancer his parents lived to their 80s without cancer mother  at 93 of CHF and had only one brother who had no cancer.  She has only one sister who had breast cancer at age 58 and again at 63 no ovarian cancer in the family  Review of Systems   Constitutional: Negative for activity change, appetite  "change, fatigue and unexpected weight change.   Respiratory: Negative for cough and shortness of breath.    Musculoskeletal: Positive for back pain.        Objective     Vitals:    02/20/18 1018   BP: 128/80   Pulse: 82   Resp: 16   Temp: 98.6 °F (37 °C)   TempSrc: Oral   SpO2: 96%   Weight: 94 kg (207 lb 3.2 oz)   Height: 170.4 cm (67.09\")  Comment: new ht w/shoes   PainSc: 0-No pain     Current Status 2/20/2018   ECOG score 0       Physical Exam    GENERAL:  Well-developed, well-nourished in no acute distress.   SKIN:  Warm, dry without rashes, purpura or petechiae.  EYES:  Pupils equal, round and reactive to light.  EOMs intact.  Conjunctivae normal.  EARS:  Hearing intact.  NOSE:  Septum midline.  No excoriations or nasal discharge.  MOUTH:  Tongue is well-papillated; no stomatitis or ulcers.  Lips normal.  THROAT:  Oropharynx without lesions or exudates.  NECK:  Supple with good range of motion; no thyromegaly or masses, no JVD.  LYMPHATICS:  No cervical, supraclavicular, axillary or inguinal adenopathy.  CHEST:  Lungs clear to auscultation. Good airflow.  BREASTS: Both breasts are benign lumpectomy scar in the right breast is well-healed but still slightly tender  CARDIAC:  Regular rate and rhythm without murmurs, rubs or gallops. Normal S1,S2.  ABDOMEN:  Soft, nontender with no hepatosplenomegaly or masses.  EXTREMITIES:  No clubbing, cyanosis or edema.  NEUROLOGICAL:  Cranial Nerves II-XII grossly intact.  No focal neurological deficits.  PSYCHIATRIC:  Normal affect and mood.        RECENT LABS:  Hematology WBC   Date Value Ref Range Status   02/20/2018 5.77 4.00 - 10.00 10*3/mm3 Final     RBC   Date Value Ref Range Status   02/20/2018 4.47 3.90 - 5.00 10*6/mm3 Final     Hemoglobin   Date Value Ref Range Status   02/20/2018 13.1 11.5 - 14.9 g/dL Final     Hematocrit   Date Value Ref Range Status   02/20/2018 40.0 34.0 - 45.0 % Final     Platelets   Date Value Ref Range Status   02/20/2018 330 150 - 375 " 10*3/mm3 Final            Assessment/Plan   1.  T1 CN 0M0 ER/MA positive HER-2 negative right-sided breast cancer  2.  Mild mitral regurgitation  3 long history of depression on Prozac-Dose increased recently with improvement  4.  Positive family history of breast cancer in her sister and colon cancer in her father the very small immediate family-saw the genetic counselors but they do not feel testing was appropriate  5.  oncotype score of 20 -Arimidex plus radiation planned       Plan  1.  Continue Arimidex 1 mg daily for 5 years at least  2.  return in 6 months review her tolerance of Arimidex

## 2018-04-04 RX ORDER — ANASTROZOLE 1 MG/1
1 TABLET ORAL DAILY
Qty: 90 TABLET | Refills: 3 | Status: SHIPPED | OUTPATIENT
Start: 2018-04-04 | End: 2019-01-03 | Stop reason: SDUPTHER

## 2018-08-10 ENCOUNTER — LAB (OUTPATIENT)
Dept: LAB | Facility: HOSPITAL | Age: 68
End: 2018-08-10

## 2018-08-10 ENCOUNTER — OFFICE VISIT (OUTPATIENT)
Dept: ONCOLOGY | Facility: CLINIC | Age: 68
End: 2018-08-10

## 2018-08-10 VITALS
SYSTOLIC BLOOD PRESSURE: 138 MMHG | TEMPERATURE: 98.2 F | WEIGHT: 208.2 LBS | HEIGHT: 67 IN | HEART RATE: 72 BPM | OXYGEN SATURATION: 98 % | BODY MASS INDEX: 32.68 KG/M2 | DIASTOLIC BLOOD PRESSURE: 88 MMHG | RESPIRATION RATE: 18 BRPM

## 2018-08-10 DIAGNOSIS — C50.919 MALIGNANT NEOPLASM OF BREAST IN FEMALE, ESTROGEN RECEPTOR POSITIVE, UNSPECIFIED LATERALITY, UNSPECIFIED SITE OF BREAST (HCC): Primary | ICD-10-CM

## 2018-08-10 DIAGNOSIS — Z17.0 MALIGNANT NEOPLASM OF BREAST IN FEMALE, ESTROGEN RECEPTOR POSITIVE, UNSPECIFIED LATERALITY, UNSPECIFIED SITE OF BREAST (HCC): ICD-10-CM

## 2018-08-10 DIAGNOSIS — Z17.0 MALIGNANT NEOPLASM OF BREAST IN FEMALE, ESTROGEN RECEPTOR POSITIVE, UNSPECIFIED LATERALITY, UNSPECIFIED SITE OF BREAST (HCC): Primary | ICD-10-CM

## 2018-08-10 DIAGNOSIS — C50.919 MALIGNANT NEOPLASM OF BREAST IN FEMALE, ESTROGEN RECEPTOR POSITIVE, UNSPECIFIED LATERALITY, UNSPECIFIED SITE OF BREAST (HCC): ICD-10-CM

## 2018-08-10 LAB
ALBUMIN SERPL-MCNC: 4 G/DL (ref 3.5–5.2)
ALBUMIN/GLOB SERPL: 1.3 G/DL (ref 1.1–2.4)
ALP SERPL-CCNC: 108 U/L (ref 38–116)
ALT SERPL W P-5'-P-CCNC: 11 U/L (ref 0–33)
ANION GAP SERPL CALCULATED.3IONS-SCNC: 11 MMOL/L
AST SERPL-CCNC: 14 U/L (ref 0–32)
BASOPHILS # BLD AUTO: 0.04 10*3/MM3 (ref 0–0.1)
BASOPHILS NFR BLD AUTO: 0.7 % (ref 0–1.1)
BILIRUB SERPL-MCNC: 0.3 MG/DL (ref 0.1–1.2)
BUN BLD-MCNC: 21 MG/DL (ref 6–20)
BUN/CREAT SERPL: 27.3 (ref 7.3–30)
CALCIUM SPEC-SCNC: 9.1 MG/DL (ref 8.5–10.2)
CHLORIDE SERPL-SCNC: 104 MMOL/L (ref 98–107)
CO2 SERPL-SCNC: 24 MMOL/L (ref 22–29)
CREAT BLD-MCNC: 0.77 MG/DL (ref 0.6–1.1)
DEPRECATED RDW RBC AUTO: 45.4 FL (ref 37–49)
EOSINOPHIL # BLD AUTO: 0.13 10*3/MM3 (ref 0–0.36)
EOSINOPHIL NFR BLD AUTO: 2.3 % (ref 1–5)
ERYTHROCYTE [DISTWIDTH] IN BLOOD BY AUTOMATED COUNT: 13.4 % (ref 11.7–14.5)
GFR SERPL CREATININE-BSD FRML MDRD: 74 ML/MIN/1.73
GLOBULIN UR ELPH-MCNC: 3 GM/DL (ref 1.8–3.5)
GLUCOSE BLD-MCNC: 95 MG/DL (ref 74–124)
HCT VFR BLD AUTO: 40.6 % (ref 34–45)
HGB BLD-MCNC: 12.8 G/DL (ref 11.5–14.9)
IMM GRANULOCYTES # BLD: 0.03 10*3/MM3 (ref 0–0.03)
IMM GRANULOCYTES NFR BLD: 0.5 % (ref 0–0.5)
LYMPHOCYTES # BLD AUTO: 2.02 10*3/MM3 (ref 1–3.5)
LYMPHOCYTES NFR BLD AUTO: 35.2 % (ref 20–49)
MCH RBC QN AUTO: 28.8 PG (ref 27–33)
MCHC RBC AUTO-ENTMCNC: 31.5 G/DL (ref 32–35)
MCV RBC AUTO: 91.4 FL (ref 83–97)
MONOCYTES # BLD AUTO: 0.51 10*3/MM3 (ref 0.25–0.8)
MONOCYTES NFR BLD AUTO: 8.9 % (ref 4–12)
NEUTROPHILS # BLD AUTO: 3.01 10*3/MM3 (ref 1.5–7)
NEUTROPHILS NFR BLD AUTO: 52.4 % (ref 39–75)
NRBC BLD MANUAL-RTO: 0 /100 WBC (ref 0–0)
PLATELET # BLD AUTO: 300 10*3/MM3 (ref 150–375)
PMV BLD AUTO: 9.3 FL (ref 8.9–12.1)
POTASSIUM BLD-SCNC: 4.2 MMOL/L (ref 3.5–4.7)
PROT SERPL-MCNC: 7 G/DL (ref 6.3–8)
RBC # BLD AUTO: 4.44 10*6/MM3 (ref 3.9–5)
SODIUM BLD-SCNC: 139 MMOL/L (ref 134–145)
WBC NRBC COR # BLD: 5.74 10*3/MM3 (ref 4–10)

## 2018-08-10 PROCEDURE — 99213 OFFICE O/P EST LOW 20 MIN: CPT | Performed by: INTERNAL MEDICINE

## 2018-08-10 PROCEDURE — 85025 COMPLETE CBC W/AUTO DIFF WBC: CPT | Performed by: INTERNAL MEDICINE

## 2018-08-10 PROCEDURE — 36415 COLL VENOUS BLD VENIPUNCTURE: CPT | Performed by: INTERNAL MEDICINE

## 2018-08-10 PROCEDURE — 80053 COMPREHEN METABOLIC PANEL: CPT | Performed by: INTERNAL MEDICINE

## 2018-08-10 NOTE — PROGRESS NOTES
Subjective   REASON FOR FOLLOWUP:    1.Right breast cancer T1c N0 ER/OR positive HER-2 negative  2.  Oncotype score of 20  3.  Radiation and Arimidex planned bone density shows osteopenia-Arimidex started in 4/17                               REQUESTING PHYSICIAN:  Trino Troncoso M.D.        History of Present Illness patient is a 58 a lady with a recently diagnosed right breast cancer T1 CN 0 ER/OR positive HER-2 negative here for follow-up after 4 years of treatment with Arimidex .  She had a lot of fatigue with the Arimidex and went to see her family doctor as she  was very depressed and did not want to do anything.  Her family doctor increased her Prozac from 40 mg to 60 mg and she is finally feeling better and doing more around the house .  Her DEXA scan in November showed improving osteopenia and a mammogram was benign    At this point she appears to be adjusting well to Arimidex and is not having much problem    She states she's never had much energy and this is about the same as her usual.  Is up-to-date with colonoscopy mammogram is due in November    Past Medical History:   Diagnosis Date   • Breast CA (CMS/HCC)    • Depression    • Hyperlipidemia    • Mitral valve regurgitation    • Mood swings (CMS/HCC)    • PONV (postoperative nausea and vomiting)    • Sleep apnea         Past Surgical History:   Procedure Laterality Date   • BREAST BIOPSY     • BREAST LUMPECTOMY WITH SENTINEL NODE BIOPSY AND AXILLARY NODE DISSECTION Right 12/13/2016    Procedure: RT BREAST LUMPECTOMY WITH SENTINEL NODE BIOPSY & MAMMO NEEDLE LOC;  Surgeon: Matt Troncoso MD;  Location: [facility] [place];  Service:    • HYSTERECTOMY     • PERCUTANEOUS PINNING FINGER FRACTURE     • THYROID CYST EXCISION     • TUBAL ABDOMINAL LIGATION        ONCOLOGIC HISTORY:   patient is a 66-year-old lady with a long history of depression hypercholesterolemia who was noted on routine mammogram on 11/9/16 to have an abnormality in the right breast at the  12 o'clock position.  This is not seen on a previous mammogram a year ago and this led to a diagnostic mammogram and ultrasound which confirmed a mass 12 o'clock position the right breast measuring about 1 cm by ultrasound with normal appearing right axillary lymph nodes.  A biopsy was done on 13 which revealed a grade 2 infiltrating ductal carcinoma ER/CA strongly positive HER-2 negative and the patient was referred to Dr. Troncoso.  Dr. Troncoso scheduled MRI of both breasts on 16 and this revealed a 1.8 x 1.3 cm mass in the right breast with no other abnormalities in the axilla or in the left breast and the patient was taken to surgery on 16 which time she had a needle localization lumpectomy and sentinel node biopsy.  Final pathology showed a 1.5 x 1.3 cm grade 2 infiltrating ductal carcinoma  With associated DCIS and margins that were clear after reexcision and one negative sentinel node.  Patient is on well since surgery and is here to discuss adjuvant chemotherapy options.  She was  3 para 3 first childbirth was at night age 17 she did not breast-feed her children.  Menarche  at age 11 and menopause at age 45 when she had a hysterectomy and oophorectomy.  She took hormonal therapy for 10 years and stopped in  when her sister had breast cancer she was on birth control pills for at least 20 years prior to her hysterectomy.  She has a sister who developed breast cancer at age 58 and her second cancer  although one of these was DCIS and the other invasive cancer.  She took tamoxifen for 5 years  Is no other breast or ovarian cancer in the family but she has a very limited family as her father was an only child and her mother had 1 brother and she has one sister  Oncotype score returned at 20 and after looking at the risks and benefits of chemotherapy in this subset which I explained was not well understood at this point pending results of the [employer] trial we have opted to give her  Arimidex and start radiation.  .    she went for genetic testing but she did not meet the criteria for testing and no further workup was done.  We talked again about long-term effects of Arimidex.  Her mood appears to be better with the higher dose of Prozac and BuSpar when necessary.  Her mother-in-law who is very difficult in general is staying with them the last 4 years and this is causing more stress for her but she realizes there is no other option        Current Outpatient Prescriptions on File Prior to Visit   Medication Sig Dispense Refill   • anastrozole (ARIMIDEX) 1 MG tablet TAKE 1 TABLET BY MOUTH DAILY. 90 tablet 3   • busPIRone (BUSPAR) 10 MG tablet Take 10 mg by mouth Daily As Needed.     • FLUoxetine (PROzac) 20 MG capsule      • FLUoxetine (PROzac) 40 MG capsule Take 40 mg by mouth Daily.     • vitamin D (ERGOCALCIFEROL) 62264 units capsule capsule TAKE 1 CAPSULE WEEKLY.  1     No current facility-administered medications on file prior to visit.         ALLERGIES:    Allergies   Allergen Reactions   • Tegaderm Ag Mesh [Silver] Other (See Comments)     SKIN BLISTERS  SKIN BLISTERS   • Sulfa Antibiotics Rash   • Zithromax [Azithromycin] Rash        Social History     Social History   • Marital status:      Spouse name: Peter   • Years of education: High school     Occupational History   •  Retired     Social History Main Topics   • Smoking status: Never Smoker   • Smokeless tobacco: Never Used   • Alcohol use No   • Drug use: No   • Sexual activity: Defer     Other Topics Concern   • Not on file        Family History   Problem Relation Age of Onset   • Heart failure Mother    • Colon cancer Father    • Liver cancer Father    • Tuberculosis Father    • Breast cancer Sister 58        DCIS      Father  at 65 of metastatic colon cancer his parents lived to their 80s without cancer mother  at 93 of CHF and had only one brother who had no cancer.  She has only one sister who had breast cancer  "at age 58 and again at 63 no ovarian cancer in the family  Review of Systems   Constitutional: Negative for activity change, appetite change, fatigue and unexpected weight change.   Respiratory: Negative for cough and shortness of breath.    Musculoskeletal: Positive for back pain.        Objective     Vitals:    08/10/18 0914   BP: 138/88   Pulse: 72   Resp: 18   Temp: 98.2 °F (36.8 °C)   TempSrc: Oral   SpO2: 98%   Weight: 94.4 kg (208 lb 3.2 oz)   Height: 170 cm (66.93\")   PainSc: 0-No pain     Current Status 8/10/2018   ECOG score 0       Physical Exam    GENERAL:  Well-developed, well-nourished in no acute distress.   SKIN:  Warm, dry without rashes, purpura or petechiae.  EYES:  Pupils equal, round and reactive to light.  EOMs intact.  Conjunctivae normal.  EARS:  Hearing intact.  NOSE:  Septum midline.  No excoriations or nasal discharge.  MOUTH:  Tongue is well-papillated; no stomatitis or ulcers.  Lips normal.  THROAT:  Oropharynx without lesions or exudates.  NECK:  Supple with good range of motion; no thyromegaly or masses, no JVD.  LYMPHATICS:  No cervical, supraclavicular, axillary or inguinal adenopathy.  CHEST:  Lungs clear to auscultation. Good airflow.  BREASTS: Both breasts are benign lumpectomy scar in the right breast is well-healed but still slightly tender  CARDIAC:  Regular rate and rhythm without murmurs, rubs or gallops. Normal S1,S2.  ABDOMEN:  Soft, nontender with no hepatosplenomegaly or masses.  EXTREMITIES:  No clubbing, cyanosis or edema.  NEUROLOGICAL:  Cranial Nerves II-XII grossly intact.  No focal neurological deficits.  PSYCHIATRIC:  Normal affect and mood.        RECENT LABS:  Hematology WBC   Date Value Ref Range Status   08/10/2018 5.74 4.00 - 10.00 10*3/mm3 Final     RBC   Date Value Ref Range Status   08/10/2018 4.44 3.90 - 5.00 10*6/mm3 Final     Hemoglobin   Date Value Ref Range Status   08/10/2018 12.8 11.5 - 14.9 g/dL Final     Hematocrit   Date Value Ref Range Status "   08/10/2018 40.6 34.0 - 45.0 % Final     Platelets   Date Value Ref Range Status   08/10/2018 300 150 - 375 10*3/mm3 Final            Assessment/Plan   1.  T1 CN 0M0 ER/NE positive HER-2 negative right-sided breast cancer  2.  Mild mitral regurgitation  3 long history of depression on Prozac-Dose increased recently with improvement  4.  Positive family history of breast cancer in her sister and colon cancer in her father the very small immediate family-saw the genetic counselors but they do not feel testing was appropriate  5.  oncotype score of 20 -Arimidex plus radiation planned       Plan  1.  Continue Arimidex 1 mg daily for 5 years at least  2.  return in 6 months for follow-up with a mammogram in November

## 2018-10-10 ENCOUNTER — ANESTHESIA EVENT (OUTPATIENT)
Dept: GASTROENTEROLOGY | Facility: HOSPITAL | Age: 68
End: 2018-10-10

## 2018-10-10 ENCOUNTER — ON CAMPUS - OUTPATIENT (OUTPATIENT)
Dept: URBAN - METROPOLITAN AREA HOSPITAL 114 | Facility: HOSPITAL | Age: 68
End: 2018-10-10
Payer: COMMERCIAL

## 2018-10-10 ENCOUNTER — HOSPITAL ENCOUNTER (OUTPATIENT)
Facility: HOSPITAL | Age: 68
Setting detail: HOSPITAL OUTPATIENT SURGERY
Discharge: HOME | End: 2018-10-10
Attending: INTERNAL MEDICINE | Admitting: INTERNAL MEDICINE

## 2018-10-10 ENCOUNTER — ANESTHESIA (OUTPATIENT)
Dept: GASTROENTEROLOGY | Facility: HOSPITAL | Age: 68
End: 2018-10-10

## 2018-10-10 VITALS
DIASTOLIC BLOOD PRESSURE: 80 MMHG | HEART RATE: 70 BPM | OXYGEN SATURATION: 96 % | TEMPERATURE: 98.1 F | BODY MASS INDEX: 32.5 KG/M2 | RESPIRATION RATE: 16 BRPM | WEIGHT: 207.06 LBS | SYSTOLIC BLOOD PRESSURE: 150 MMHG | HEIGHT: 67 IN

## 2018-10-10 DIAGNOSIS — K63.89 OTHER SPECIFIED DISEASES OF INTESTINE: ICD-10-CM

## 2018-10-10 DIAGNOSIS — Z12.11 ENCOUNTER FOR SCREENING FOR MALIGNANT NEOPLASM OF COLON: ICD-10-CM

## 2018-10-10 DIAGNOSIS — Z12.11 SCREENING FOR COLON CANCER: ICD-10-CM

## 2018-10-10 PROCEDURE — 25010000002 PROPOFOL 10 MG/ML EMULSION: Performed by: ANESTHESIOLOGY

## 2018-10-10 PROCEDURE — 45380 COLONOSCOPY AND BIOPSY: CPT | Mod: 33 | Performed by: INTERNAL MEDICINE

## 2018-10-10 PROCEDURE — 88305 TISSUE EXAM BY PATHOLOGIST: CPT | Performed by: INTERNAL MEDICINE

## 2018-10-10 RX ORDER — SODIUM CHLORIDE, SODIUM LACTATE, POTASSIUM CHLORIDE, CALCIUM CHLORIDE 600; 310; 30; 20 MG/100ML; MG/100ML; MG/100ML; MG/100ML
1000 INJECTION, SOLUTION INTRAVENOUS CONTINUOUS
Status: DISCONTINUED | OUTPATIENT
Start: 2018-10-10 | End: 2018-10-10 | Stop reason: HOSPADM

## 2018-10-10 RX ORDER — LIDOCAINE HYDROCHLORIDE 20 MG/ML
INJECTION, SOLUTION INFILTRATION; PERINEURAL AS NEEDED
Status: DISCONTINUED | OUTPATIENT
Start: 2018-10-10 | End: 2018-10-10 | Stop reason: SURG

## 2018-10-10 RX ORDER — LORATADINE 10 MG/1
CAPSULE, LIQUID FILLED ORAL
COMMUNITY
End: 2019-08-16 | Stop reason: SDUPTHER

## 2018-10-10 RX ORDER — LANOLIN ALCOHOL/MO/W.PET/CERES
1000 CREAM (GRAM) TOPICAL DAILY
COMMUNITY
End: 2019-07-30

## 2018-10-10 RX ORDER — PROPOFOL 10 MG/ML
VIAL (ML) INTRAVENOUS AS NEEDED
Status: DISCONTINUED | OUTPATIENT
Start: 2018-10-10 | End: 2018-10-10 | Stop reason: SURG

## 2018-10-10 RX ADMIN — SODIUM CHLORIDE, POTASSIUM CHLORIDE, SODIUM LACTATE AND CALCIUM CHLORIDE 1000 ML: 600; 310; 30; 20 INJECTION, SOLUTION INTRAVENOUS at 08:36

## 2018-10-10 RX ADMIN — PROPOFOL 100 MG: 10 INJECTION, EMULSION INTRAVENOUS at 09:26

## 2018-10-10 RX ADMIN — LIDOCAINE HYDROCHLORIDE 50 MG: 20 INJECTION, SOLUTION INFILTRATION; PERINEURAL at 09:26

## 2018-10-10 NOTE — ANESTHESIA POSTPROCEDURE EVALUATION
"Patient: Renae Schmitz    Procedure Summary     Date:  10/10/18 Room / Location:  [facility] ENDOSCOPY 5 /  [facility]    Anesthesia Start:  0926 Anesthesia Stop:  0948    Procedure:  COLONOSCOPY TO CECUM AND TERMINAL ILEUM WITH COLD BIOPSIES (N/A ) Diagnosis:      Surgeon:  Carl Salas MD Provider:  Meg Sam MD    Anesthesia Type:  MAC ASA Status:  3          Anesthesia Type: MAC  Last vitals  BP   150/80 (10/10/18 1008)   Temp   36.7 °C (98.1 °F) (10/10/18 0958)   Pulse   70 (10/10/18 1008)   Resp   16 (10/10/18 1008)     SpO2   96 % (10/10/18 1008)     Post Anesthesia Care and Evaluation    Patient location during evaluation: PACU  Patient participation: complete - patient participated  Level of consciousness: awake  Pain score: 0  Pain management: adequate  Airway patency: patent  Anesthetic complications: No anesthetic complications    Cardiovascular status: acceptable  Respiratory status: acceptable  Hydration status: acceptable    Comments: Blood pressure 150/80, pulse 70, temperature 36.7 °C (98.1 °F), temperature source Oral, resp. rate 16, height 170.2 cm (67\"), weight 93.9 kg (207 lb 1 oz), SpO2 96 %.    No anesthesia care post op    "

## 2018-10-10 NOTE — H&P
Patient Care Team:  Lenora Servin PA as PCP - General (Physician Assistant)  Jaime Puente MD as PCP - Claims Attributed  Jaime Puente MD as Consulting Physician (Cardiology)  Aaron Correa MD as Consulting Physician (Hematology and Oncology)  Aylin Parra APRN as Consulting Physician (Obstetrics and Gynecology)  Matt Troncoso MD as Referring Physician (General Surgery)  Aylin Parra APRN as Referring Physician (Obstetrics and Gynecology)    Chief complaint screening    Subjective     History of Present Illness  Patient mentions diarrhea, with meals, some foods at times,  No black or bloody stools has had several colonoscopies with a surgeon in [place]  In the past.   Review of Systems   Gastrointestinal: Positive for diarrhea.   All other systems reviewed and are negative.       Past Medical History:   Diagnosis Date   • Breast CA (CMS/HCC)    • Depression    • Hyperlipidemia    • Mitral valve regurgitation    • Mood swings    • PONV (postoperative nausea and vomiting)    • Sleep apnea      Past Surgical History:   Procedure Laterality Date   • BREAST BIOPSY     • BREAST LUMPECTOMY WITH SENTINEL NODE BIOPSY AND AXILLARY NODE DISSECTION Right 12/13/2016    Procedure: RT BREAST LUMPECTOMY WITH SENTINEL NODE BIOPSY & MAMMO NEEDLE LOC;  Surgeon: Matt Troncoso MD;  Location: [facility] OR [facility] [place];  Service:    • HYSTERECTOMY     • PERCUTANEOUS PINNING FINGER FRACTURE     • THYROID CYST EXCISION     • TUBAL ABDOMINAL LIGATION       Family History   Problem Relation Age of Onset   • Heart failure Mother    • Colon cancer Father    • Liver cancer Father    • Tuberculosis Father    • Breast cancer Sister 58        DCIS     Social History   Substance Use Topics   • Smoking status: Never Smoker   • Smokeless tobacco: Never Used   • Alcohol use No     Prescriptions Prior to Admission   Medication Sig Dispense Refill Last Dose   • busPIRone (BUSPAR) 10 MG tablet Take 10 mg by mouth Daily As  Needed.   Past Week at Unknown time   • Loratadine (CLARITIN) 10 MG capsule Take  by mouth.   Past Week at Unknown time   • vitamin B-12 (CYANOCOBALAMIN) 1000 MCG tablet Take 1,000 mcg by mouth Daily.   Past Week at Unknown time   • anastrozole (ARIMIDEX) 1 MG tablet TAKE 1 TABLET BY MOUTH DAILY. 90 tablet 3 10/8/2018   • FLUoxetine (PROzac) 20 MG capsule    10/8/2018   • FLUoxetine (PROzac) 40 MG capsule Take 40 mg by mouth Daily.   10/8/2018   • vitamin D (ERGOCALCIFEROL) 17714 units capsule capsule TAKE 1 CAPSULE WEEKLY.  1 10/4/2018     Allergies:  Tegaderm ag mesh [silver]; Sulfa antibiotics; and Zithromax [azithromycin]    Objective      Vital Signs  Temp:  [98 °F (36.7 °C)] 98 °F (36.7 °C)  Heart Rate:  [72] 72  Resp:  [14] 14  BP: (146)/(67) 146/67    Physical Exam   Constitutional: She is oriented to person, place, and time. She appears well-developed and well-nourished.   HENT:   Mouth/Throat: Oropharynx is clear and moist.   Eyes: Conjunctivae are normal.   Cardiovascular: Normal rate and regular rhythm.    Pulmonary/Chest: Effort normal and breath sounds normal.   Abdominal: Soft. Bowel sounds are normal.   Neurological: She is alert and oriented to person, place, and time.   Skin: Skin is warm and dry.       Results Review:   I reviewed the patient's new clinical results.      Assessment/Plan       * No active hospital problems. *      Assessment:  (Screening for colon cancer).     Plan:   (Colonoscopy risks, alternatives and benefits discussed with patient and the patient is agreeable to having procedure done.).       I discussed the patients findings and my recommendations with patient and nursing staff    Carl Salas MD  10/10/18  9:28 AM

## 2018-10-10 NOTE — ANESTHESIA PREPROCEDURE EVALUATION
Anesthesia Evaluation     Patient summary reviewed and Nursing notes reviewed   history of anesthetic complications: PONV  NPO Solid Status: > 8 hours  NPO Liquid Status: > 8 hours           Airway   Mallampati: II  TM distance: <3 FB  Neck ROM: full  Possible difficult intubation  Dental - normal exam     Pulmonary - normal exam   (+) sleep apnea,   Cardiovascular - normal exam    (+) valvular problems/murmurs MR, hyperlipidemia,       Neuro/Psych  GI/Hepatic/Renal/Endo    (+) obesity,       Musculoskeletal     Abdominal    Substance History      OB/GYN          Other      history of cancer (breast) active                    Anesthesia Plan    ASA 3     MAC     Anesthetic plan, all risks, benefits, and alternatives have been provided, discussed and informed consent has been obtained with: patient.

## 2018-10-11 LAB
CYTO UR: NORMAL
LAB AP CASE REPORT: NORMAL
PATH REPORT.FINAL DX SPEC: NORMAL
PATH REPORT.GROSS SPEC: NORMAL

## 2018-12-04 ENCOUNTER — APPOINTMENT (OUTPATIENT)
Dept: WOMENS IMAGING | Facility: HOSPITAL | Age: 68
End: 2018-12-04

## 2018-12-04 PROCEDURE — MDREVIEWSP: Performed by: RADIOLOGY

## 2018-12-04 PROCEDURE — 77067 SCR MAMMO BI INCL CAD: CPT | Performed by: RADIOLOGY

## 2018-12-04 PROCEDURE — 77063 BREAST TOMOSYNTHESIS BI: CPT | Performed by: RADIOLOGY

## 2019-01-03 RX ORDER — ANASTROZOLE 1 MG/1
1 TABLET ORAL DAILY
Qty: 90 TABLET | Refills: 0 | Status: SHIPPED | OUTPATIENT
Start: 2019-01-03 | End: 2019-03-27 | Stop reason: SDUPTHER

## 2019-01-22 ENCOUNTER — OFFICE VISIT (OUTPATIENT)
Dept: INTERNAL MEDICINE | Facility: CLINIC | Age: 69
End: 2019-01-22

## 2019-01-22 VITALS
RESPIRATION RATE: 16 BRPM | WEIGHT: 207 LBS | HEIGHT: 67 IN | TEMPERATURE: 98.4 F | BODY MASS INDEX: 32.49 KG/M2 | SYSTOLIC BLOOD PRESSURE: 130 MMHG | DIASTOLIC BLOOD PRESSURE: 82 MMHG | HEART RATE: 80 BPM | OXYGEN SATURATION: 95 %

## 2019-01-22 DIAGNOSIS — G47.33 OSA ON CPAP: ICD-10-CM

## 2019-01-22 DIAGNOSIS — Z99.89 OSA ON CPAP: ICD-10-CM

## 2019-01-22 DIAGNOSIS — F32.5 MAJOR DEPRESSIVE DISORDER IN REMISSION, UNSPECIFIED WHETHER RECURRENT (HCC): ICD-10-CM

## 2019-01-22 DIAGNOSIS — E55.9 VITAMIN D DEFICIENCY: ICD-10-CM

## 2019-01-22 DIAGNOSIS — E53.8 VITAMIN B 12 DEFICIENCY: ICD-10-CM

## 2019-01-22 DIAGNOSIS — I34.0 NON-RHEUMATIC MITRAL REGURGITATION: ICD-10-CM

## 2019-01-22 DIAGNOSIS — E78.5 HYPERLIPIDEMIA LDL GOAL <100: Primary | ICD-10-CM

## 2019-01-22 DIAGNOSIS — M85.859 OSTEOPENIA OF HIP, UNSPECIFIED LATERALITY: ICD-10-CM

## 2019-01-22 DIAGNOSIS — E04.1 BENIGN THYROID CYST: ICD-10-CM

## 2019-01-22 PROBLEM — G47.30 MILD SLEEP APNEA: Status: ACTIVE | Noted: 2019-01-22

## 2019-01-22 PROBLEM — F32.9 MAJOR DEPRESSIVE DISORDER: Status: ACTIVE | Noted: 2019-01-22

## 2019-01-22 PROBLEM — M85.80 OSTEOPENIA: Status: ACTIVE | Noted: 2019-01-22

## 2019-01-22 PROCEDURE — 99214 OFFICE O/P EST MOD 30 MIN: CPT | Performed by: NURSE PRACTITIONER

## 2019-01-22 RX ORDER — ROSUVASTATIN CALCIUM 20 MG/1
20 TABLET, COATED ORAL DAILY
COMMUNITY
End: 2019-05-07 | Stop reason: SDUPTHER

## 2019-01-22 NOTE — PROGRESS NOTES
Chief Complaint   Patient presents with   • Establish Care     Her nurse practitioner left the country   • Hyperlipidemia   • Depression       Subjective     Renae Schmitz is a 68 y.o. female being seen for a new patient appointment today regarding Depression, Hyperlipidemia, history of breast cancer, Mitral vlave insufficiency, and sleep apnea. She is currently taking prozac 40mg daily with a 10mg Buspar as needed for depression. She was diagnosed 30 years ago after a divorce. She has occasional break thru anxiety.    She has history of Mitral valve regurgitation and hyperlipidemia. She is currently followed by Dr. Puente, cardiology and recently had an ECHO.     She takes Crestor 20mg nightly. She is tolerating it well. She is due for labs. She has history of sleep apnea from a sleep study in 2016, but she used her C-pap for 1 year, but quit using it.     She was diagnosised with breast cancer 2016. She had a lumpectomy and radiation. She started Arimidex 4-2017.         History of Present Illness     Allergies   Allergen Reactions   • Tegaderm Ag Mesh [Silver] Other (See Comments)     SKIN BLISTERS  SKIN BLISTERS   • Sulfa Antibiotics Rash   • Zithromax [Azithromycin] Rash         Current Outpatient Medications:   •  anastrozole (ARIMIDEX) 1 MG tablet, Take 1 tablet by mouth Daily., Disp: 90 tablet, Rfl: 0  •  busPIRone (BUSPAR) 10 MG tablet, Take 10 mg by mouth Daily As Needed., Disp: , Rfl:   •  FLUoxetine (PROzac) 20 MG capsule, , Disp: , Rfl:   •  FLUoxetine (PROzac) 40 MG capsule, Take 40 mg by mouth Daily., Disp: , Rfl:   •  fluticasone (VERAMYST) 27.5 MCG/SPRAY nasal spray, 2 sprays into the nostril(s) as directed by provider Daily., Disp: , Rfl:   •  Loratadine (CLARITIN) 10 MG capsule, Take  by mouth., Disp: , Rfl:   •  Probiotic Product (PROBIOTIC-10) capsule, Take  by mouth., Disp: , Rfl:   •  rosuvastatin (CRESTOR) 20 MG tablet, Take 20 mg by mouth Daily., Disp: , Rfl:   •  vitamin B-12  (CYANOCOBALAMIN) 1000 MCG tablet, Take 1,000 mcg by mouth Daily., Disp: , Rfl:   •  vitamin D (ERGOCALCIFEROL) 13048 units capsule capsule, TAKE 1 CAPSULE WEEKLY., Disp: , Rfl: 1    The following portions of the patient's history were reviewed and updated as appropriate: allergies, current medications, past family history, past medical history, past social history, past surgical history and problem list.    Review of Systems   Constitutional: Negative.  Negative for diaphoresis, fatigue, fever and unexpected weight change.   HENT: Negative.    Eyes: Negative.    Respiratory: Negative.  Negative for shortness of breath, wheezing and stridor.    Cardiovascular: Negative for chest pain, palpitations and leg swelling.   Gastrointestinal: Negative.  Negative for abdominal distention and abdominal pain.   Endocrine: Negative.    Genitourinary: Negative.    Musculoskeletal: Negative.  Negative for arthralgias and back pain.   Skin: Negative.    Allergic/Immunologic: Negative.    Neurological: Negative.    Hematological: Negative.  Negative for adenopathy. Does not bruise/bleed easily.   Psychiatric/Behavioral: Negative.        Assessment     Physical Exam   Constitutional: She is oriented to person, place, and time. She appears well-developed and well-nourished. No distress.   HENT:   Head: Normocephalic.   Right Ear: External ear normal.   Left Ear: External ear normal.   Nose: Nose normal.   Mouth/Throat: Oropharynx is clear and moist. No oropharyngeal exudate.   Neck: Neck supple.   Cardiovascular: Normal rate and regular rhythm.   Murmur heard.   Systolic murmur is present with a grade of 2/6.   Diastolic murmur is present with a grade of 2/6.  Pulmonary/Chest: Effort normal and breath sounds normal. No stridor. No respiratory distress.   Abdominal: Soft. Bowel sounds are normal. She exhibits no distension. There is no tenderness.   Musculoskeletal: She exhibits no edema.   Neurological: She is alert and oriented to  person, place, and time. No cranial nerve deficit.   Skin: Skin is warm and dry. No erythema.   Psychiatric: She has a normal mood and affect. Her behavior is normal. Thought content normal.   Vitals reviewed.      Plan     Her records were reviewed with the patient from several specilaists.     Renae was seen today for establish care, hyperlipidemia and depression.    Diagnoses and all orders for this visit:    Hyperlipidemia LDL goal <100  -     Comprehensive metabolic panel  -     Conv Lipid Panel w/ Chol/HDL Ratio  -     CBC & Differential    Non-rheumatic mitral regurgitation  -     Comprehensive metabolic panel  -     Conv Lipid Panel w/ Chol/HDL Ratio  -     CBC & Differential    Major depressive disorder in remission, unspecified whether recurrent (CMS/HCC)  -     CBC & Differential    MARSHALL on CPAP  -     CBC & Differential    Osteopenia of hip, unspecified laterality  -     CBC & Differential    Vitamin B 12 deficiency  -     CBC & Differential  -     Vitamin B12    Vitamin D deficiency  -     CBC & Differential  -     Vitamin D 1,25 Dihydroxy    Benign thyroid cyst  -     CBC & Differential  -     TSH      Obtained/reviewed records from Pharmacy.     Will set up a CPE in 6 months.

## 2019-01-23 NOTE — PROGRESS NOTES
Subjective   REASON FOR FOLLOWUP:    1.Right breast cancer T1c N0 ER/CA positive HER-2 negative  2.  Oncotype score of 20  3.  Radiation and Arimidex planned bone density shows osteopenia-Arimidex started in 4/17                               REQUESTING PHYSICIAN:  Trino Troncoso M.D.        History of Present Illness patient is a 58 a lady with a  right breast cancer T1 CN 0 ER/CA positive HER-2 negative here for follow-up after 2 years of treatment with Arimidex .  She had a lot of fatigue with the Arimidex and went to see her family doctor as she  was very depressed and did not want to do anything.  Her family doctor increased her Prozac from 40 mg to 60 mg and she is finally feeling better and doing more around the house .  Her DEXA scan in November showed improving osteopenia and a mammogram was benign last month  Was having pain in her foot for about 2 months and finally saw  who diagnosed an insufficiency fracture told her bone density was low.  For her in a boot for 6 weeks and the pain resolved her last mammogram did not show osteoporosis but I think we will repeated because she's been on Arimidex to see if she needs a bisphosphonate-she is taking vitamin D but similar recommended 1500 mg of calcium also    At this point she appears to be adjusting well to Arimidex and is not having much problem    She states she's never had much energy and this is about the same as her usual.  Is up-to-date with colonoscopy     Past Medical History:   Diagnosis Date   • Anemia     prior to hysterectomy several years ago   • Anxiety    • Benign thyroid cyst 1971   • Breast CA (CMS/[facility]) 12/01/2016    Right lumpectomy   • Depression    • H/O mammogram 12/04/2018   • History of Papanicolaou smear of cervix 2016    Dr. Johnson   • Hyperlipidemia    • Mitral valve regurgitation    • Mood swings    • MARSHALL on CPAP 1/22/2019   • PONV (postoperative nausea and vomiting)    • Sleep apnea         Past Surgical History:   Procedure  Laterality Date   • BREAST BIOPSY     • BREAST LUMPECTOMY WITH SENTINEL NODE BIOPSY AND AXILLARY NODE DISSECTION Right 2016    Procedure: RT BREAST LUMPECTOMY WITH SENTINEL NODE BIOPSY & MAMMO NEEDLE LOC;  Surgeon: Matt Troncoso MD;  [place] [facility] [place] [facility] [place]  Service:    • COLONOSCOPY N/A 10/10/2018    Procedure: COLONOSCOPY TO CECUM AND TERMINAL ILEUM WITH COLD BIOPSIES;  Surgeon: Carl Salas MD;  Location: [facility] ENDOSCOPY;  Service: Gastroenterology   • HYSTERECTOMY      Complete 1995   • PERCUTANEOUS PINNING FINGER FRACTURE     • THYROID CYST EXCISION     • TUBAL ABDOMINAL LIGATION        ONCOLOGIC HISTORY:   patient is a 66-year-old lady with a long history of depression hypercholesterolemia who was noted on routine mammogram on 16 to have an abnormality in the right breast at the 12 o'clock position.  This is not seen on a previous mammogram a year ago and this led to a diagnostic mammogram and ultrasound which confirmed a mass 12 o'clock position the right breast measuring about 1 cm by ultrasound with normal appearing right axillary lymph nodes.  A biopsy was done on 13 which revealed a grade 2 infiltrating ductal carcinoma ER/KS strongly positive HER-2 negative and the patient was referred to Dr. Troncoso.  Dr. Troncoso scheduled MRI of both breasts on 16 and this revealed a 1.8 x 1.3 cm mass in the right breast with no other abnormalities in the axilla or in the left breast and the patient was taken to surgery on 16 which time she had a needle localization lumpectomy and sentinel node biopsy.  Final pathology showed a 1.5 x 1.3 cm grade 2 infiltrating ductal carcinoma  With associated DCIS and margins that were clear after reexcision and one negative sentinel node.  Patient is on well since surgery and is here to discuss adjuvant chemotherapy options.  She was  3 para 3 first childbirth was at night age 17 she did not breast-feed her children.  Menarche  at age 11  and menopause at age 45 when she had a hysterectomy and oophorectomy.  She took hormonal therapy for 10 years and stopped in 2005 when her sister had breast cancer she was on birth control pills for at least 20 years prior to her hysterectomy.  She has a sister who developed breast cancer at age 58 and her second cancer 2010 although one of these was DCIS and the other invasive cancer.  She took tamoxifen for 5 years  Is no other breast or ovarian cancer in the family but she has a very limited family as her father was an only child and her mother had 1 brother and she has one sister  Oncotype score returned at 20 and after looking at the risks and benefits of chemotherapy in this subset which I explained was not well understood at this point pending results of the [employer] trial we have opted to give her Arimidex and start radiation.  .    she went for genetic testing but she did not meet the criteria for testing and no further workup was done.  We talked again about long-term effects of Arimidex.  Her mood appears to be better with the higher dose of Prozac and BuSpar when necessary.  Her mother-in-law who is very difficult in general is staying with them the last 4 years and this is causing more stress for her but she realizes there is no other option        Current Outpatient Medications on File Prior to Visit   Medication Sig Dispense Refill   • anastrozole (ARIMIDEX) 1 MG tablet Take 1 tablet by mouth Daily. 90 tablet 0   • busPIRone (BUSPAR) 10 MG tablet Take 10 mg by mouth Daily As Needed.     • FLUoxetine (PROzac) 40 MG capsule Take 40 mg by mouth Daily.     • fluticasone (VERAMYST) 27.5 MCG/SPRAY nasal spray 2 sprays into the nostril(s) as directed by provider Daily.     • Loratadine (CLARITIN) 10 MG capsule Take  by mouth.     • Probiotic Product (PROBIOTIC-10) capsule Take  by mouth.     • rosuvastatin (CRESTOR) 20 MG tablet Take 20 mg by mouth Daily.     • vitamin B-12 (CYANOCOBALAMIN) 1000 MCG tablet  Take 1,000 mcg by mouth Daily.     • vitamin D (ERGOCALCIFEROL) 64331 units capsule capsule TAKE 1 CAPSULE WEEKLY.  1     No current facility-administered medications on file prior to visit.         ALLERGIES:    Allergies   Allergen Reactions   • Tegaderm Ag Mesh [Silver] Other (See Comments)     SKIN BLISTERS  SKIN BLISTERS   • Sulfa Antibiotics Rash   • Zithromax [Azithromycin] Rash        Social History     Socioeconomic History   • Marital status:      Spouse name: Peter   • Number of children: Not on file   • Years of education: High school   • Highest education level: Not on file   Occupational History     Employer: RETIRED   Tobacco Use   • Smoking status: Never Smoker   • Smokeless tobacco: Never Used   Substance and Sexual Activity   • Alcohol use: No   • Drug use: No   • Sexual activity: Yes     Partners: Male     Birth control/protection: Surgical   Social History Narrative     is Peter.    Patient retired from a bank and factory work. [place], has [place] acres.        2 Natural daughters; 1 step-daughter and    1 Son    She watches her grand babies.         She has a flower garden.         Family History   Problem Relation Age of Onset   • Heart failure Mother    • Colon cancer Father    • Liver cancer Father    • Tuberculosis Father    • Breast cancer Sister 58        DCIS   • Depression Daughter       Father  at 65 of metastatic colon cancer his parents lived to their 80s without cancer mother  at 93 of CHF and had only one brother who had no cancer.  She has only one sister who had breast cancer at age 58 and again at 63 no ovarian cancer in the family  Review of Systems   Constitutional: Negative for activity change, appetite change, chills, fatigue, fever and unexpected weight change.   Eyes: Positive for visual disturbance (19).   Respiratory: Negative for cough, chest tightness and shortness of breath.    Cardiovascular: Negative for chest pain.  "  Gastrointestinal: Negative for constipation, diarrhea, nausea and vomiting.   Genitourinary: Negative for difficulty urinating.   Musculoskeletal: Positive for arthralgias (jaw pain 1/24/19) and gait problem (left heel 1/24/19). Negative for back pain.   Neurological: Negative for dizziness and headaches.   Psychiatric/Behavioral: The patient is nervous/anxious (1/24/19).         Objective     Vitals:    01/24/19 1025   BP: 135/76   Pulse: 80   Resp: 16   Temp: 98.1 °F (36.7 °C)   SpO2: 96%   Weight: 94.7 kg (208 lb 12.8 oz)   Height: 170.2 cm (67.01\")   PainSc: 0-No pain     Current Status 1/24/2019   ECOG score 0       Physical Exam    GENERAL:  Well-developed, well-nourished in no acute distress.   SKIN:  Warm, dry without rashes, purpura or petechiae.  EYES:  Pupils equal, round and reactive to light.  EOMs intact.  Conjunctivae normal.  EARS:  Hearing intact.  NOSE:  Septum midline.  No excoriations or nasal discharge.  MOUTH:  Tongue is well-papillated; no stomatitis or ulcers.  Lips normal.  THROAT:  Oropharynx without lesions or exudates.  NECK:  Supple with good range of motion; no thyromegaly or masses, no JVD.  LYMPHATICS:  No cervical, supraclavicular, axillary or inguinal adenopathy.  CHEST:  Lungs clear to auscultation. Good airflow.  BREASTS: Both breasts are benign lumpectomy scar in the right breast is well-healed but   CARDIAC:  Regular rate and rhythm without murmurs, rubs or gallops. Normal S1,S2.  ABDOMEN:  Soft, nontender with no hepatosplenomegaly or masses.  EXTREMITIES:  No clubbing, cyanosis or edema.  NEUROLOGICAL:  Cranial Nerves II-XII grossly intact.  No focal neurological deficits.  PSYCHIATRIC:  Normal affect and mood.        RECENT LABS:  Hematology WBC   Date Value Ref Range Status   08/10/2018 5.74 4.00 - 10.00 10*3/mm3 Final     RBC   Date Value Ref Range Status   08/10/2018 4.44 3.90 - 5.00 10*6/mm3 Final     Hemoglobin   Date Value Ref Range Status   08/10/2018 " 12.8 11.5 - 14.9 g/dL Final     Hematocrit   Date Value Ref Range Status   08/10/2018 40.6 34.0 - 45.0 % Final     Platelets   Date Value Ref Range Status   08/10/2018 300 150 - 375 10*3/mm3 Final                Assessment/Plan   1.  T1 CN 0M0 ER/AR positive HER-2 negative right-sided breast cancer  2.  Mild mitral regurgitation  3 long history of depression on Prozac-Dose increased recently with improvement  4.  Positive family history of breast cancer in her sister and colon cancer in her father the very small immediate family-saw the genetic counselors but they do not feel testing was appropriate  5.  oncotype score of 20 -Arimidex plus radiation planned       Plan  1.  Continue Arimidex 1 mg daily for 5 years at least  2.  return in 6 months   3.  DEXA scan to be done sooner than the 2 year mary because of the insufficiency fracture of her foot and we will review and decide about adding Prolia  4/ increase calcium to 1500 mg daily

## 2019-01-24 ENCOUNTER — OFFICE VISIT (OUTPATIENT)
Dept: ONCOLOGY | Facility: CLINIC | Age: 69
End: 2019-01-24

## 2019-01-24 ENCOUNTER — APPOINTMENT (OUTPATIENT)
Dept: LAB | Facility: HOSPITAL | Age: 69
End: 2019-01-24

## 2019-01-24 VITALS
DIASTOLIC BLOOD PRESSURE: 76 MMHG | TEMPERATURE: 98.1 F | WEIGHT: 208.8 LBS | HEIGHT: 67 IN | HEART RATE: 80 BPM | RESPIRATION RATE: 16 BRPM | OXYGEN SATURATION: 96 % | SYSTOLIC BLOOD PRESSURE: 135 MMHG | BODY MASS INDEX: 32.77 KG/M2

## 2019-01-24 DIAGNOSIS — Z79.811 USE OF ANASTROZOLE (ARIMIDEX): ICD-10-CM

## 2019-01-24 DIAGNOSIS — Z17.0 MALIGNANT NEOPLASM OF BREAST IN FEMALE, ESTROGEN RECEPTOR POSITIVE, UNSPECIFIED LATERALITY, UNSPECIFIED SITE OF BREAST (HCC): Primary | ICD-10-CM

## 2019-01-24 DIAGNOSIS — C50.919 MALIGNANT NEOPLASM OF BREAST IN FEMALE, ESTROGEN RECEPTOR POSITIVE, UNSPECIFIED LATERALITY, UNSPECIFIED SITE OF BREAST (HCC): Primary | ICD-10-CM

## 2019-01-24 LAB
1,25(OH)2D3 SERPL-MCNC: 96.8 PG/ML (ref 19.9–79.3)
ALBUMIN SERPL-MCNC: 4.2 G/DL (ref 3.5–5.2)
ALBUMIN/GLOB SERPL: 1.6 G/DL
ALP SERPL-CCNC: 101 U/L (ref 40–129)
ALT SERPL-CCNC: 23 U/L (ref 5–33)
AST SERPL-CCNC: 21 U/L (ref 5–32)
BASOPHILS # BLD AUTO: 0.03 10*3/MM3 (ref 0–0.2)
BASOPHILS NFR BLD AUTO: 0.4 % (ref 0–2)
BILIRUB SERPL-MCNC: 0.4 MG/DL (ref 0.2–1.2)
BUN SERPL-MCNC: 14 MG/DL (ref 8–23)
BUN/CREAT SERPL: 18.7 (ref 7–25)
CALCIUM SERPL-MCNC: 9.3 MG/DL (ref 8.8–10.5)
CHLORIDE SERPL-SCNC: 101 MMOL/L (ref 98–107)
CHOLEST SERPL-MCNC: 153 MG/DL (ref 0–200)
CHOLEST/HDLC SERPL: 2.19 {RATIO}
CO2 SERPL-SCNC: 24 MMOL/L (ref 22–29)
CREAT SERPL-MCNC: 0.75 MG/DL (ref 0.57–1)
EOSINOPHIL # BLD AUTO: 0.14 10*3/MM3 (ref 0.1–0.3)
EOSINOPHIL NFR BLD AUTO: 2 % (ref 0–4)
ERYTHROCYTE [DISTWIDTH] IN BLOOD BY AUTOMATED COUNT: 13.6 % (ref 11.5–14.5)
GLOBULIN SER CALC-MCNC: 2.7 GM/DL
GLUCOSE SERPL-MCNC: 89 MG/DL (ref 65–99)
HCT VFR BLD AUTO: 40 % (ref 37–47)
HDLC SERPL-MCNC: 70 MG/DL (ref 40–60)
HGB BLD-MCNC: 12.9 G/DL (ref 12–16)
IMM GRANULOCYTES # BLD AUTO: 0.03 10*3/MM3 (ref 0–0.03)
IMM GRANULOCYTES NFR BLD AUTO: 0.4 % (ref 0–0.5)
LDLC SERPL CALC-MCNC: 67 MG/DL (ref 0–100)
LYMPHOCYTES # BLD AUTO: 2.12 10*3/MM3 (ref 0.6–4.8)
LYMPHOCYTES NFR BLD AUTO: 29.9 % (ref 20–45)
MCH RBC QN AUTO: 29.1 PG (ref 27–31)
MCHC RBC AUTO-ENTMCNC: 32.3 G/DL (ref 31–37)
MCV RBC AUTO: 90.3 FL (ref 81–99)
MONOCYTES # BLD AUTO: 0.52 10*3/MM3 (ref 0–1)
MONOCYTES NFR BLD AUTO: 7.3 % (ref 3–8)
NEUTROPHILS # BLD AUTO: 4.25 10*3/MM3 (ref 1.5–8.3)
NEUTROPHILS NFR BLD AUTO: 60 % (ref 45–70)
NRBC BLD AUTO-RTO: 0 /100 WBC (ref 0–0)
PLATELET # BLD AUTO: 334 10*3/MM3 (ref 140–500)
POTASSIUM SERPL-SCNC: 4.2 MMOL/L (ref 3.5–5.2)
PROT SERPL-MCNC: 6.9 G/DL (ref 6–8.5)
RBC # BLD AUTO: 4.43 10*6/MM3 (ref 4.2–5.4)
SODIUM SERPL-SCNC: 139 MMOL/L (ref 136–145)
TRIGL SERPL-MCNC: 82 MG/DL (ref 0–150)
TSH SERPL DL<=0.005 MIU/L-ACNC: 1.56 MIU/ML (ref 0.27–4.2)
VIT B12 SERPL-MCNC: 1213 PG/ML
VLDLC SERPL CALC-MCNC: 16.4 MG/DL (ref 7–27)
WBC # BLD AUTO: 7.09 10*3/MM3 (ref 4.8–10.8)

## 2019-01-24 PROCEDURE — G0463 HOSPITAL OUTPT CLINIC VISIT: HCPCS | Performed by: INTERNAL MEDICINE

## 2019-01-24 PROCEDURE — 99214 OFFICE O/P EST MOD 30 MIN: CPT | Performed by: INTERNAL MEDICINE

## 2019-01-29 ENCOUNTER — TELEPHONE (OUTPATIENT)
Dept: INTERNAL MEDICINE | Facility: CLINIC | Age: 69
End: 2019-01-29

## 2019-03-12 ENCOUNTER — DOCUMENTATION (OUTPATIENT)
Dept: OTHER | Facility: HOSPITAL | Age: 69
End: 2019-03-12

## 2019-03-12 NOTE — PROGRESS NOTES
Call placed to patient RE: open referral to survivorship clinic from Dr Correa. Our office spoke to the patient and reviewed purpose and goals of survivorship visit as well as what to expect. Patient declines at present due to time constraints as her  is recovering from a recent surgery. Patient encouraged to call anytime should they reconsider.

## 2019-03-19 ENCOUNTER — APPOINTMENT (OUTPATIENT)
Dept: WOMENS IMAGING | Facility: HOSPITAL | Age: 69
End: 2019-03-19

## 2019-03-19 PROCEDURE — 77080 DXA BONE DENSITY AXIAL: CPT | Performed by: RADIOLOGY

## 2019-03-27 RX ORDER — ANASTROZOLE 1 MG/1
1 TABLET ORAL DAILY
Qty: 90 TABLET | Refills: 3 | Status: SHIPPED | OUTPATIENT
Start: 2019-03-27 | End: 2019-05-13 | Stop reason: SDUPTHER

## 2019-03-29 ENCOUNTER — TELEPHONE (OUTPATIENT)
Dept: ONCOLOGY | Facility: HOSPITAL | Age: 69
End: 2019-03-29

## 2019-03-29 NOTE — TELEPHONE ENCOUNTER
Went over bone density results with pt and mailed her a copy.     ----- Message from Ariadne Cullen sent at 3/29/2019  3:36 PM EDT -----  [phone]  Needs results from bone density scan from a couple weeks ago.

## 2019-05-07 RX ORDER — ROSUVASTATIN CALCIUM 20 MG/1
20 TABLET, COATED ORAL DAILY
Qty: 90 TABLET | Refills: 1 | Status: SHIPPED | OUTPATIENT
Start: 2019-05-07 | End: 2019-12-06 | Stop reason: SDUPTHER

## 2019-05-07 RX ORDER — FLUOXETINE HYDROCHLORIDE 40 MG/1
40 CAPSULE ORAL DAILY
Qty: 90 CAPSULE | Refills: 1 | Status: SHIPPED | OUTPATIENT
Start: 2019-05-07 | End: 2019-07-30

## 2019-05-13 ENCOUNTER — TELEPHONE (OUTPATIENT)
Dept: ONCOLOGY | Facility: HOSPITAL | Age: 69
End: 2019-05-13

## 2019-05-13 RX ORDER — ANASTROZOLE 1 MG/1
1 TABLET ORAL DAILY
Qty: 30 TABLET | Refills: 0 | Status: SHIPPED | OUTPATIENT
Start: 2019-05-13 | End: 2019-05-15 | Stop reason: SDUPTHER

## 2019-05-13 NOTE — TELEPHONE ENCOUNTER
----- Message from Jil Childers sent at 5/13/2019  9:05 AM EDT -----  Contact: [phone]  [facility] is calling to get a few Arimidex to hold her until her mail order comes in  Need a few pills until mail order arrives. [facility] stated send in 30 because is same cost to patient. E scribed to [facility].

## 2019-05-15 RX ORDER — ANASTROZOLE 1 MG/1
1 TABLET ORAL DAILY
Qty: 90 TABLET | Refills: 0 | Status: SHIPPED | OUTPATIENT
Start: 2019-05-15 | End: 2019-08-01

## 2019-06-27 ENCOUNTER — TELEPHONE (OUTPATIENT)
Dept: OTHER | Facility: HOSPITAL | Age: 69
End: 2019-06-27

## 2019-06-27 NOTE — TELEPHONE ENCOUNTER
Call placed to patient to verify receipt of survivorship care plan and treatment summary. LVM with my contact info encouraging call back

## 2019-07-23 DIAGNOSIS — E53.8 VITAMIN B 12 DEFICIENCY: ICD-10-CM

## 2019-07-23 DIAGNOSIS — E78.5 HYPERLIPIDEMIA LDL GOAL <100: Primary | ICD-10-CM

## 2019-07-23 LAB
ALBUMIN SERPL-MCNC: 4.4 G/DL (ref 3.5–5.2)
ALBUMIN/GLOB SERPL: 2.1 G/DL
ALP SERPL-CCNC: 103 U/L (ref 39–117)
ALT SERPL-CCNC: 17 U/L (ref 1–33)
AST SERPL-CCNC: 18 U/L (ref 1–32)
BASOPHILS # BLD AUTO: 0.04 10*3/MM3 (ref 0–0.2)
BASOPHILS NFR BLD AUTO: 0.8 % (ref 0–1.5)
BILIRUB SERPL-MCNC: 0.5 MG/DL (ref 0.2–1.2)
BUN SERPL-MCNC: 18 MG/DL (ref 8–23)
BUN/CREAT SERPL: 25.4 (ref 7–25)
CALCIUM SERPL-MCNC: 9.1 MG/DL (ref 8.6–10.5)
CHLORIDE SERPL-SCNC: 107 MMOL/L (ref 98–107)
CHOLEST SERPL-MCNC: 142 MG/DL (ref 0–200)
CHOLEST/HDLC SERPL: 1.95 {RATIO}
CO2 SERPL-SCNC: 24.4 MMOL/L (ref 22–29)
CREAT SERPL-MCNC: 0.71 MG/DL (ref 0.57–1)
EOSINOPHIL # BLD AUTO: 0.19 10*3/MM3 (ref 0–0.4)
EOSINOPHIL NFR BLD AUTO: 3.7 % (ref 0.3–6.2)
ERYTHROCYTE [DISTWIDTH] IN BLOOD BY AUTOMATED COUNT: 14.2 % (ref 12.3–15.4)
GLOBULIN SER CALC-MCNC: 2.1 GM/DL
GLUCOSE SERPL-MCNC: 90 MG/DL (ref 65–99)
HCT VFR BLD AUTO: 39.6 % (ref 34–46.6)
HDLC SERPL-MCNC: 73 MG/DL (ref 40–60)
HGB BLD-MCNC: 12.5 G/DL (ref 12–15.9)
IMM GRANULOCYTES # BLD AUTO: 0.03 10*3/MM3 (ref 0–0.05)
IMM GRANULOCYTES NFR BLD AUTO: 0.6 % (ref 0–0.5)
LDLC SERPL CALC-MCNC: 56 MG/DL (ref 0–100)
LYMPHOCYTES # BLD AUTO: 2.04 10*3/MM3 (ref 0.7–3.1)
LYMPHOCYTES NFR BLD AUTO: 39.2 % (ref 19.6–45.3)
MCH RBC QN AUTO: 29.4 PG (ref 26.6–33)
MCHC RBC AUTO-ENTMCNC: 31.6 G/DL (ref 31.5–35.7)
MCV RBC AUTO: 93.2 FL (ref 79–97)
MONOCYTES # BLD AUTO: 0.48 10*3/MM3 (ref 0.1–0.9)
MONOCYTES NFR BLD AUTO: 9.2 % (ref 5–12)
NEUTROPHILS # BLD AUTO: 2.42 10*3/MM3 (ref 1.7–7)
NEUTROPHILS NFR BLD AUTO: 46.5 % (ref 42.7–76)
NRBC BLD AUTO-RTO: 0 /100 WBC (ref 0–0.2)
PLATELET # BLD AUTO: 276 10*3/MM3 (ref 140–450)
POTASSIUM SERPL-SCNC: 4.2 MMOL/L (ref 3.5–5.2)
PROT SERPL-MCNC: 6.5 G/DL (ref 6–8.5)
RBC # BLD AUTO: 4.25 10*6/MM3 (ref 3.77–5.28)
SODIUM SERPL-SCNC: 141 MMOL/L (ref 136–145)
TRIGL SERPL-MCNC: 65 MG/DL (ref 0–150)
VIT B12 SERPL-MCNC: 484 PG/ML (ref 211–946)
VLDLC SERPL CALC-MCNC: 13 MG/DL (ref 5–40)
WBC # BLD AUTO: 5.2 10*3/MM3 (ref 3.4–10.8)

## 2019-07-30 ENCOUNTER — OFFICE VISIT (OUTPATIENT)
Dept: INTERNAL MEDICINE | Facility: CLINIC | Age: 69
End: 2019-07-30

## 2019-07-30 VITALS
DIASTOLIC BLOOD PRESSURE: 76 MMHG | SYSTOLIC BLOOD PRESSURE: 128 MMHG | RESPIRATION RATE: 16 BRPM | WEIGHT: 214 LBS | HEIGHT: 67 IN | BODY MASS INDEX: 33.59 KG/M2 | OXYGEN SATURATION: 98 % | TEMPERATURE: 97.9 F | HEART RATE: 65 BPM

## 2019-07-30 DIAGNOSIS — M85.852 OSTEOPENIA OF LEFT HIP: ICD-10-CM

## 2019-07-30 DIAGNOSIS — F41.8 DEPRESSION WITH ANXIETY: ICD-10-CM

## 2019-07-30 DIAGNOSIS — Z00.00 MEDICARE ANNUAL WELLNESS VISIT, SUBSEQUENT: Primary | ICD-10-CM

## 2019-07-30 DIAGNOSIS — I34.0 MITRAL VALVE INSUFFICIENCY, UNSPECIFIED ETIOLOGY: ICD-10-CM

## 2019-07-30 DIAGNOSIS — E78.5 HYPERLIPIDEMIA LDL GOAL <100: ICD-10-CM

## 2019-07-30 PROCEDURE — 99214 OFFICE O/P EST MOD 30 MIN: CPT | Performed by: NURSE PRACTITIONER

## 2019-07-30 PROCEDURE — G0439 PPPS, SUBSEQ VISIT: HCPCS | Performed by: NURSE PRACTITIONER

## 2019-07-30 RX ORDER — LANOLIN ALCOHOL/MO/W.PET/CERES
1000 CREAM (GRAM) TOPICAL DAILY
Qty: 12 TABLET | Refills: 3
Start: 2019-07-30 | End: 2020-02-14 | Stop reason: SDUPTHER

## 2019-07-30 RX ORDER — ERGOCALCIFEROL 1.25 MG/1
50000 CAPSULE ORAL WEEKLY
Qty: 12 CAPSULE | Refills: 3 | Status: SHIPPED | OUTPATIENT
Start: 2019-07-30 | End: 2020-02-14 | Stop reason: SDUPTHER

## 2019-07-30 RX ORDER — DESVENLAFAXINE SUCCINATE 50 MG/1
50 TABLET, EXTENDED RELEASE ORAL DAILY
Qty: 30 TABLET | Refills: 1 | Status: SHIPPED | OUTPATIENT
Start: 2019-07-30 | End: 2019-08-22 | Stop reason: SDUPTHER

## 2019-07-30 NOTE — PROGRESS NOTES
QUICK REFERENCE INFORMATION:  The ABCs of Providing the Annual Wellness Visit   CMS.gov [employer] Medicare Subsequent Wellness Visit      Subjective   History of Present Illness    Renae Schmitz is a 68 y.o. female who presents for an Subsequent Wellness Visit. In addition, we addressed the following health issues:    Depression, Hyperlipidemia, MARSHALL, MVR    She is on Crestor 20 mg nightly, tolerating it without myalgias. She has a history of Mitral valve regurge, and she is followed by Dr. Puente.     She had a sleep study in 2016, showing sleep apnea. She used her C-pap for 1 year, but quit using it.     She has a history of breast cancer, diagnosed in 2016. She had a lumpectomy. She is followed by Dr. Correa. She has been on Arimidex since 4-2017.    She has a 30 year history of depression. This has been worsening for the past 6 months. Reports lack of motivation. Denies suicidal thoughts.       PMH, PSH, SocHx, FamHx, Allergies, and Medications: Reviewed and updated in the Visit Navigator.     Outpatient Medications Prior to Visit   Medication Sig Dispense Refill   • anastrozole (ARIMIDEX) 1 MG tablet Take 1 tablet by mouth Daily. 90 tablet 0   • busPIRone (BUSPAR) 10 MG tablet Take 10 mg by mouth Daily As Needed.     • FLUoxetine (PROzac) 40 MG capsule Take 1 capsule by mouth Daily. 90 capsule 1   • Loratadine (CLARITIN) 10 MG capsule Take  by mouth.     • rosuvastatin (CRESTOR) 20 MG tablet Take 1 tablet by mouth Daily. 90 tablet 1   • fluticasone (VERAMYST) 27.5 MCG/SPRAY nasal spray 2 sprays into the nostril(s) as directed by provider Daily.     • Probiotic Product (PROBIOTIC-10) capsule Take  by mouth.     • vitamin B-12 (CYANOCOBALAMIN) 1000 MCG tablet Take 1,000 mcg by mouth Daily.     • vitamin D (ERGOCALCIFEROL) 18830 units capsule capsule TAKE 1 CAPSULE WEEKLY.  1     No facility-administered medications prior to visit.        Patient Active Problem List   Diagnosis   • Breast CA (CMS/HCC)   •  Major depressive disorder   • Mitral regurgitation   • Hyperlipidemia LDL goal <100   • Mild sleep apnea   • Osteopenia   • Vitamin D deficiency   • Vitamin B 12 deficiency   • Benign thyroid cyst       Health Habits:  Dental Exam. up to date  Eye Exam. up to date  Exercise: 0 times/week.  Current exercise activities include: none    Social:  See review in SnapShot activity and in SocHx section of Visit Navigator.    Health Risk Assessment:  The patient has completed a Health Risk Assessment. This has been reviewed with them and has been scanned into Media Manager as a separate document.    Current Medical Providers:  Patient Care Team:  Julienne Dupree APRN as PCP - General (Family Medicine)  Nicolas Rhoades MD as PCP - [facility] Attributed  Jaime Puente MD as Consulting Physician (Cardiology)  Ute Correa MD as Consulting Physician (Hematology and Oncology)  Aylin Parra APRN as Consulting Physician (Obstetrics and Gynecology)  Matt Troncoso MD as Referring Physician (General Surgery)  Aylin Parra APRN as Referring Physician (Obstetrics and Gynecology)    The [facility] providers who are involved in the care of this patient are listed above. Additional providers and suppliers are listed below:  Dr. Sunny Gilliam    Recent Hospitalizations:  No recent hospitalization(s)..    Age-appropriate Screening Schedule:  Refer to the list below for future screening recommendations based on patient's age. Orders for these recommended tests are listed in the plan section. The patient has been provided with a written plan.    Health Maintenance   Topic Date Due   • ANNUAL PHYSICAL  10/09/1953   • ZOSTER VACCINE (1 of 2) 10/09/1998   • HEPATITIS C SCREENING  04/06/2017   • INFLUENZA VACCINE  08/01/2019   • PNEUMOCOCCAL VACCINES (65+ LOW/MEDIUM RISK) (2 of 2 - PCV13) 08/08/2019   • LIPID PANEL  07/23/2020   • MAMMOGRAM  12/04/2020   • DXA SCAN  03/19/2021   • TDAP/TD VACCINES (2 - Td) 07/01/2023    • COLONOSCOPY  10/10/2028       Depression Screen:   PHQ-2/PHQ-9 Depression Screening 7/30/2019   Little interest or pleasure in doing things 0   Feeling down, depressed, or hopeless 1   Trouble falling or staying asleep, or sleeping too much 0   Feeling tired or having little energy 3   Poor appetite or overeating 0   Feeling bad about yourself - or that you are a failure or have let yourself or your family down 0   Trouble concentrating on things, such as reading the newspaper or watching television 0   Moving or speaking so slowly that other people could have noticed. Or the opposite - being so fidgety or restless that you have been moving around a lot more than usual 0   Thoughts that you would be better off dead, or of hurting yourself in some way 0   Total Score 4   If you checked off any problems, how difficult have these problems made it for you to do your work, take care of things at home, or get along with other people? Somewhat difficult       Functional and Cognitive Screening:  Functional & Cognitive Status 7/30/2019   Do you have difficulty preparing food and eating? No   Do you have difficulty bathing yourself, getting dressed or grooming yourself? No   Do you have difficulty using the toilet? No   Do you have difficulty moving around from place to place? No   Do you have trouble with steps or getting out of a bed or a chair? No   Current Diet Well Balanced Diet   Dental Exam Up to date   Eye Exam Up to date   Exercise (times per week) 0 times per week   Current Exercise Activities Include No Regular Exercise   Do you need help using the phone?  No   Are you deaf or do you have serious difficulty hearing?  No   Do you need help with transportation? No   Do you need help shopping? No   Do you need help preparing meals?  No   Do you need help with housework?  No   Do you need help with laundry? No   Do you need help taking your medications? No   Do you need help managing money? No   Do you ever drive  "or ride in a car without wearing a seat belt? No   Have you felt unusual stress, anger or loneliness in the last month? No   Who do you live with? Spouse   If you need help, do you have trouble finding someone available to you? No   Have you been bothered in the last four weeks by sexual problems? No   Do you have difficulty concentrating, remembering or making decisions? No       Does the patient have evidence of cognitive impairment? No    Identification of Risk Factors:  Risk factors include: Advance Directive Discussion  Cardiovascular risk  Dementia/Memory   Glaucoma Risk  Immunizations Discussed/Encouraged (specific immunizations; Pneumococcal 23 and Shingrix )  Osteoprorosis Risk  Polypharmacy.    Review of Systems   Constitutional: Negative.    HENT: Negative.    Eyes: Negative.    Respiratory: Negative.    Cardiovascular: Negative.  Negative for chest pain, palpitations and leg swelling.   Gastrointestinal: Negative.    Endocrine: Negative.    Genitourinary: Negative.    Musculoskeletal: Negative.    Skin: Negative.    Allergic/Immunologic: Negative.    Neurological: Negative.    Hematological: Negative.    Psychiatric/Behavioral: Negative.        /76   Pulse 65   Temp 97.9 °F (36.6 °C) (Oral)   Resp 16   Ht 170.2 cm (67\")   Wt 97.1 kg (214 lb)   SpO2 98%   BMI 33.52 kg/m²     General Appearance:    Alert, cooperative, no distress, appears stated age   Head:    Normocephalic, without obvious abnormality, atraumatic   Eyes:    PERRL, conjunctiva/corneas clear, EOM's intact, fundi     benign, both eyes   Ears:    Normal TM's and external ear canals, both ears   Nose:   Nares normal, septum midline, mucosa normal, no drainage     or sinus tenderness   Throat:   Lips, mucosa, and tongue normal; teeth and gums normal   Neck:   Supple, symmetrical, trachea midline, no adenopathy;     thyroid:  no enlargement/tenderness/nodules; no carotid    bruit or JVD   Back:     Symmetric, no curvature, ROM " "normal, no CVA tenderness   Lungs:     Clear to auscultation bilaterally, respirations unlabored   Chest Wall:    No tenderness or deformity    Heart:    Regular rate and rhythm, S1 and S2 normal, no murmur, rub    or gallop   Breast Exam:    Deferred to GYN   Abdomen:     Soft, non-tender, bowel sounds active all four quadrants,     no masses, no organomegaly   Genitalia:    deferred   Rectal:    deferred   Extremities:   Extremities normal, atraumatic, no cyanosis or edema   Pulses:   2+ and symmetric all extremities   Skin:   Skin color, texture, turgor normal, no rashes or lesions   Lymph nodes:   Cervical, supraclavicular, and axillary nodes normal   Neurologic:   CNII-XII intact, normal strength, sensation and reflexes     throughout       Objective     Vitals:    07/30/19 0749   BP: 128/76   Pulse: 65   Resp: 16   Temp: 97.9 °F (36.6 °C)   TempSrc: Oral   SpO2: 98%   Weight: 97.1 kg (214 lb)   Height: 170.2 cm (67\")       Body mass index is 33.52 kg/m².    Assessment/Plan   Patient Self-Management and Personalized Health Advice  The patient has been provided with information about: diet, exercise, weight management, the relationship between weight and GERD, fall prevention and designing advance directives and preventive services including:   · Alcohol Misuse Screening and Counseling  (15 minutes counseling time, Code )  · Annual Wellness Visit (AWV)  · Bone Density Measurements  · Influenza Vaccine and Administration  · Medical Nutrition Therapy (MNT)  · Pneumococcal Vaccine and Administration.    Discussed the patient's BMI with her. The BMI is above average; BMI management plan is completed.    Orders:     Diagnosis Plan   1. Medicare annual wellness visit, subsequent     2. Hyperlipidemia LDL goal <100     3. Osteopenia of left hip  vitamin D (ERGOCALCIFEROL) 60790 units capsule capsule   4. Mitral valve insufficiency, unspecified etiology     5. Depression with anxiety  desvenlafaxine (PRISTIQ) 50 MG " 24 hr tablet     Taper Fluoxetine to 20mg (has capsules at home) for 3 days, then stop. Start Pristiq 50 mg daily.     Follow Up:    3 weeks    An After Visit Summary and PPPS with all of these plans were given to the patient.

## 2019-08-01 ENCOUNTER — OFFICE VISIT (OUTPATIENT)
Dept: ONCOLOGY | Facility: CLINIC | Age: 69
End: 2019-08-01

## 2019-08-01 ENCOUNTER — LAB (OUTPATIENT)
Dept: LAB | Facility: HOSPITAL | Age: 69
End: 2019-08-01

## 2019-08-01 VITALS
WEIGHT: 214.8 LBS | BODY MASS INDEX: 33.71 KG/M2 | HEIGHT: 67 IN | HEART RATE: 73 BPM | DIASTOLIC BLOOD PRESSURE: 80 MMHG | OXYGEN SATURATION: 95 % | TEMPERATURE: 98.3 F | RESPIRATION RATE: 18 BRPM | SYSTOLIC BLOOD PRESSURE: 139 MMHG

## 2019-08-01 DIAGNOSIS — C50.919 MALIGNANT NEOPLASM OF BREAST IN FEMALE, ESTROGEN RECEPTOR POSITIVE, UNSPECIFIED LATERALITY, UNSPECIFIED SITE OF BREAST (HCC): Primary | ICD-10-CM

## 2019-08-01 DIAGNOSIS — C50.919 MALIGNANT NEOPLASM OF BREAST IN FEMALE, ESTROGEN RECEPTOR POSITIVE, UNSPECIFIED LATERALITY, UNSPECIFIED SITE OF BREAST (HCC): ICD-10-CM

## 2019-08-01 DIAGNOSIS — Z17.0 MALIGNANT NEOPLASM OF BREAST IN FEMALE, ESTROGEN RECEPTOR POSITIVE, UNSPECIFIED LATERALITY, UNSPECIFIED SITE OF BREAST (HCC): ICD-10-CM

## 2019-08-01 DIAGNOSIS — Z17.0 MALIGNANT NEOPLASM OF BREAST IN FEMALE, ESTROGEN RECEPTOR POSITIVE, UNSPECIFIED LATERALITY, UNSPECIFIED SITE OF BREAST (HCC): Primary | ICD-10-CM

## 2019-08-01 LAB
BASOPHILS # BLD AUTO: 0.04 10*3/MM3 (ref 0–0.2)
BASOPHILS NFR BLD AUTO: 0.6 % (ref 0–1.5)
DEPRECATED RDW RBC AUTO: 46.4 FL (ref 37–54)
EOSINOPHIL # BLD AUTO: 0.32 10*3/MM3 (ref 0–0.4)
EOSINOPHIL NFR BLD AUTO: 5 % (ref 0.3–6.2)
ERYTHROCYTE [DISTWIDTH] IN BLOOD BY AUTOMATED COUNT: 13.8 % (ref 12.3–15.4)
HCT VFR BLD AUTO: 40.4 % (ref 34–46.6)
HGB BLD-MCNC: 13.5 G/DL (ref 12–15.9)
IMM GRANULOCYTES # BLD AUTO: 0.02 10*3/MM3 (ref 0–0.05)
IMM GRANULOCYTES NFR BLD AUTO: 0.3 % (ref 0–0.5)
LYMPHOCYTES # BLD AUTO: 2.12 10*3/MM3 (ref 0.7–3.1)
LYMPHOCYTES NFR BLD AUTO: 33 % (ref 19.6–45.3)
MCH RBC QN AUTO: 30.2 PG (ref 26.6–33)
MCHC RBC AUTO-ENTMCNC: 33.4 G/DL (ref 31.5–35.7)
MCV RBC AUTO: 90.4 FL (ref 79–97)
MONOCYTES # BLD AUTO: 0.59 10*3/MM3 (ref 0.1–0.9)
MONOCYTES NFR BLD AUTO: 9.2 % (ref 5–12)
NEUTROPHILS # BLD AUTO: 3.33 10*3/MM3 (ref 1.7–7)
NEUTROPHILS NFR BLD AUTO: 51.9 % (ref 42.7–76)
NRBC BLD AUTO-RTO: 0 /100 WBC (ref 0–0.2)
PLATELET # BLD AUTO: 258 10*3/MM3 (ref 140–450)
PMV BLD AUTO: 9.2 FL (ref 6–12)
RBC # BLD AUTO: 4.47 10*6/MM3 (ref 3.77–5.28)
WBC NRBC COR # BLD: 6.42 10*3/MM3 (ref 3.4–10.8)

## 2019-08-01 PROCEDURE — 36415 COLL VENOUS BLD VENIPUNCTURE: CPT | Performed by: INTERNAL MEDICINE

## 2019-08-01 PROCEDURE — 99214 OFFICE O/P EST MOD 30 MIN: CPT | Performed by: INTERNAL MEDICINE

## 2019-08-01 PROCEDURE — 85025 COMPLETE CBC W/AUTO DIFF WBC: CPT | Performed by: INTERNAL MEDICINE

## 2019-08-01 RX ORDER — TAMOXIFEN CITRATE 20 MG/1
20 TABLET ORAL DAILY
Qty: 30 TABLET | Refills: 4 | Status: SHIPPED | OUTPATIENT
Start: 2019-08-01 | End: 2019-08-02 | Stop reason: SDUPTHER

## 2019-08-01 NOTE — PROGRESS NOTES
Subjective   REASON FOR FOLLOWUP:    1.Right breast cancer T1c N0 ER/NE positive HER-2 negative  2.  Oncotype score of 20  3.  Radiation and Arimidex planned bone density shows osteopenia-Arimidex started in 4/17                               REQUESTING PHYSICIAN:  Trino Troncoso M.D.        History of Present Illness patient is a 58 a lady with a  right breast cancer T1 CN 0 ER/NE positive HER-2 negative here for follow-up after 2.5 years of treatment with Arimidex .  She had a lot of fatigue with the Arimidex and went to see her family doctor as she  was very depressed and did not want to do anything.  Her family doctor change her medication from Prozac to Pristiq and she is tolerating it much better     Was having pain in her foot for about 2 months and finally saw  who diagnosed an insufficiency fracture told her bone density was low.  For her in a boot for 6 weeks and the pain resolved her last DEXA scan did not show osteoporosis we repeated it and the new scan shows a T score of -2.2 in the left hip which is significantly worse therefore we will stop her Arimidex and switch to tamoxifen especially since she is off the Prozac and on Pristiq  The side effects and toxicities of Tamoxifen were discussed with the patient, including hot flashes, mood swings,depression, DVT . A list of drugs that interfere with the efficacy of tamoxifen and are to be avoided were given to the patient.    She states she's never had much energy and this is about the same as her usual.  Is up-to-date with colonoscopy     Past Medical History:   Diagnosis Date   • Anemia     prior to hysterectomy several years ago   • Anxiety    • Benign thyroid cyst 1971   • Breast CA (CMS/HCC) 12/01/2016    Right lumpectomy   • Depression    • Fibromyalgia    • H/O mammogram 12/04/2018   • Heart murmur    • History of ankle fracture     Right   • History of Papanicolaou smear of cervix 2016    Dr. Johnson   • Hypercholesterolemia    • Hyperlipidemia     • Mitral valve regurgitation    • Mood swings    • MARSHALL on CPAP 1/22/2019   • PONV (postoperative nausea and vomiting)    • Sleep apnea         Past Surgical History:   Procedure Laterality Date   • BREAST BIOPSY     • BREAST LUMPECTOMY WITH SENTINEL NODE BIOPSY AND AXILLARY NODE DISSECTION Right 12/13/2016    Procedure: RT BREAST LUMPECTOMY WITH SENTINEL NODE BIOPSY & MAMMO NEEDLE LOC;  Surgeon: Matt Troncoso MD;  [place] [facility] [place] [facility] [place]  Service:    • COLONOSCOPY N/A 10/10/2018    Procedure: COLONOSCOPY TO CECUM AND TERMINAL ILEUM WITH COLD BIOPSIES;  Surgeon: Carl Salas MD;  Location: [facility] ENDOSCOPY;  Service: Gastroenterology   • HAND SURGERY Left 2003    2 FINGERS PINNED   • HYSTERECTOMY  1995    Complete Sept 1995   • PERCUTANEOUS PINNING FINGER FRACTURE     • THYROID CYST EXCISION  1971   • TUBAL ABDOMINAL LIGATION        ONCOLOGIC HISTORY:   patient is a 66-year-old lady with a long history of depression hypercholesterolemia who was noted on routine mammogram on 11/9/16 to have an abnormality in the right breast at the 12 o'clock position.  This is not seen on a previous mammogram a year ago and this led to a diagnostic mammogram and ultrasound which confirmed a mass 12 o'clock position the right breast measuring about 1 cm by ultrasound with normal appearing right axillary lymph nodes.  A biopsy was done on 11/21/13 which revealed a grade 2 infiltrating ductal carcinoma ER/KY strongly positive HER-2 negative and the patient was referred to Dr. Troncoso.  Dr. Troncoso scheduled MRI of both breasts on 12/8/16 and this revealed a 1.8 x 1.3 cm mass in the right breast with no other abnormalities in the axilla or in the left breast and the patient was taken to surgery on 12/13/16 which time she had a needle localization lumpectomy and sentinel node biopsy.  Final pathology showed a 1.5 x 1.3 cm grade 2 infiltrating ductal carcinoma  With associated DCIS and margins that were clear after reexcision and one  negative sentinel node.  Patient is on well since surgery and is here to discuss adjuvant chemotherapy options.  She was  3 para 3 first childbirth was at night age 17 she did not breast-feed her children.  Menarche  at age 11 and menopause at age 45 when she had a hysterectomy and oophorectomy.  She took hormonal therapy for 10 years and stopped in  when her sister had breast cancer she was on birth control pills for at least 20 years prior to her hysterectomy.  She has a sister who developed breast cancer at age 58 and her second cancer  although one of these was DCIS and the other invasive cancer.  She took tamoxifen for 5 years  Is no other breast or ovarian cancer in the family but she has a very limited family as her father was an only child and her mother had 1 brother and she has one sister  Oncotype score returned at 20 and after looking at the risks and benefits of chemotherapy in this subset which I explained was not well understood at this point pending results of the [employer] trial we have opted to give her Arimidex and start radiation.  .    she went for genetic testing but she did not meet the criteria for testing and no further workup was done.  We talked again about long-term effects of Arimidex.  Her mood appears to be better with the higher dose of Prozac and BuSpar when necessary.  Her mother-in-law who is very difficult in general is staying with them the last 4 years and this is causing more stress for her but she realizes there is no other option        Current Outpatient Medications on File Prior to Visit   Medication Sig Dispense Refill   • busPIRone (BUSPAR) 10 MG tablet Take 10 mg by mouth Daily As Needed.     • Loratadine (CLARITIN) 10 MG capsule Take  by mouth.     • rosuvastatin (CRESTOR) 20 MG tablet Take 1 tablet by mouth Daily. 90 tablet 1   • [DISCONTINUED] anastrozole (ARIMIDEX) 1 MG tablet Take 1 tablet by mouth Daily. 90 tablet 0   • desvenlafaxine (PRISTIQ) 50 MG  "24 hr tablet Take 1 tablet by mouth Daily. 30 tablet 1   • vitamin B-12 (CYANOCOBALAMIN) 1000 MCG tablet Take 1 tablet by mouth Daily. 12 tablet 3   • vitamin D (ERGOCALCIFEROL) 54584 units capsule capsule Take 1 capsule by mouth 1 (One) Time Per Week. 12 capsule 3     No current facility-administered medications on file prior to visit.         ALLERGIES:    Allergies   Allergen Reactions   • Tegaderm Ag Mesh [Silver] Other (See Comments)     SKIN BLISTERS  SKIN BLISTERS   • Shingrix [Zoster Vac Recomb Adjuvanted] Other (See Comments)     \"ice water sensation in legs\"   • Sulfa Antibiotics Rash   • Zithromax [Azithromycin] Rash        Social History     Socioeconomic History   • Marital status:      Spouse name: Peter   • Number of children: Not on file   • Years of education: High school   • Highest education level: Not on file   Occupational History     Employer: RETIRED   Tobacco Use   • Smoking status: Never Smoker   • Smokeless tobacco: Never Used   Substance and Sexual Activity   • Alcohol use: No   • Drug use: No   • Sexual activity: Yes     Partners: Male     Birth control/protection: Surgical   Social History Narrative     is Peter.    Patient retired from a bank and factory work. [place], has [place] acres.        2 Natural daughters; 1 step-daughter and    1 Son    She watches her grand babies.         She has a flower garden.         Family History   Problem Relation Age of Onset   • Heart failure Mother    • Colon cancer Father    • Liver cancer Father    • Tuberculosis Father    • Breast cancer Sister 58        DCIS   • Depression Daughter       Father  at 65 of metastatic colon cancer his parents lived to their 80s without cancer mother  at 93 of CHF and had only one brother who had no cancer.  She has only one sister who had breast cancer at age 58 and again at 63 no ovarian cancer in the family  Review of Systems   Constitutional: Positive for fatigue (19). Negative " "for activity change, appetite change, chills, fever and unexpected weight change.   Eyes: Positive for visual disturbance (cataract).   Respiratory: Negative for cough, chest tightness and shortness of breath.    Cardiovascular: Negative for chest pain and leg swelling.   Gastrointestinal: Negative for constipation, diarrhea, nausea and vomiting.   Genitourinary: Negative for difficulty urinating.   Musculoskeletal: Positive for arthralgias, back pain (after a period of standing 8/1/19) and gait problem (hips 8/1/19). Negative for joint swelling and neck pain.        TMJ   Neurological: Negative for dizziness and headaches.   Psychiatric/Behavioral: Positive for dysphoric mood (8/1/19 starting new meds tonight). The patient is nervous/anxious (worse 8/1/19).         Objective     Vitals:    08/01/19 1014   BP: 139/80   Pulse: 73   Resp: 18   Temp: 98.3 °F (36.8 °C)   SpO2: 95%   Weight: 97.4 kg (214 lb 12.8 oz)   Height: 170.2 cm (67.01\")   PainSc: 0-No pain     Current Status 8/1/2019   ECOG score 0       Physical Exam    GENERAL:  Well-developed, well-nourished in no acute distress.   SKIN:  Warm, dry without rashes, purpura or petechiae.  EYES:  Pupils equal, round and reactive to light.  EOMs intact.  Conjunctivae normal.  EARS:  Hearing intact.  NOSE:  Septum midline.  No excoriations or nasal discharge.  MOUTH:  Tongue is well-papillated; no stomatitis or ulcers.  Lips normal.  THROAT:  Oropharynx without lesions or exudates.  NECK:  Supple with good range of motion; no thyromegaly or masses, no JVD.  LYMPHATICS:  No cervical, supraclavicular, axillary or inguinal adenopathy.  CHEST:  Lungs clear to auscultation. Good airflow.  BREASTS: Both breasts are benign lumpectomy scar in the right breast is well-healed but   CARDIAC:  Regular rate and rhythm without murmurs, rubs or gallops. Normal S1,S2.  ABDOMEN:  Soft, nontender with no hepatosplenomegaly or masses.  EXTREMITIES:  No clubbing, cyanosis " or edema.  NEUROLOGICAL:  Cranial Nerves II-XII grossly intact.  No focal neurological deficits.  PSYCHIATRIC:  Normal affect and mood.        RECENT LABS:  Hematology WBC   Date Value Ref Range Status   08/01/2019 6.42 3.40 - 10.80 10*3/mm3 Final   07/23/2019 5.20 3.40 - 10.80 10*3/mm3 Final     RBC   Date Value Ref Range Status   08/01/2019 4.47 3.77 - 5.28 10*6/mm3 Final   07/23/2019 4.25 3.77 - 5.28 10*6/mm3 Final     Hemoglobin   Date Value Ref Range Status   08/01/2019 13.5 12.0 - 15.9 g/dL Final     Hematocrit   Date Value Ref Range Status   08/01/2019 40.4 34.0 - 46.6 % Final     Platelets   Date Value Ref Range Status   08/01/2019 258 140 - 450 10*3/mm3 Final      Dexa Scan          Assessment/Plan   1.  T1 CN 0M0 ER/VT positive HER-2 negative right-sided breast cancer  ·  oncotype score of 20 -Arimidex plus radiation planned started in 3/17  · Worsening bone density in 3/19 switch to tamoxifen in 8/19      2.  Mild mitral regurgitation  3 long history of depression on Prozac-Dose increased recently with improvement-change to Pristiq in 4/19  4.  Positive family history of breast cancer in her sister and colon cancer in her father the very small immediate family-saw the genetic counselors but they do not feel testing was appropriate  5.  Worsening bone density in 3/19    Plan  1.  discontinue Arimidex 1  2.  Tamoxifen 20 mg daily   3.return in 6 months   I encouraged her to call if she has any undue side effects from the tamoxifen and stressed the toxicity profile again

## 2019-08-02 RX ORDER — TAMOXIFEN CITRATE 20 MG/1
20 TABLET ORAL DAILY
Qty: 90 TABLET | Refills: 1 | Status: SHIPPED | OUTPATIENT
Start: 2019-08-02 | End: 2020-02-13 | Stop reason: SDUPTHER

## 2019-08-05 ENCOUNTER — TELEPHONE (OUTPATIENT)
Dept: ONCOLOGY | Facility: HOSPITAL | Age: 69
End: 2019-08-05

## 2019-08-05 NOTE — TELEPHONE ENCOUNTER
----- Message from Jil Childers sent at 8/5/2019  1:20 PM EDT -----  Contact: [phone]  Pharmacy has concerns about drug interaction  Yaneli

## 2019-08-05 NOTE — TELEPHONE ENCOUNTER
Pharmacy calling regarding Tamoxifen and Prozac drug interaction.  Per pt's records, Prozac d/c'd on 7/30 and pt started on Pristiq.  Pharmacy notified of same.  No interactions with Pristiq.

## 2019-08-16 RX ORDER — LORATADINE 10 MG/1
10 CAPSULE, LIQUID FILLED ORAL DAILY
Qty: 90 EACH | Refills: 0 | Status: SHIPPED | OUTPATIENT
Start: 2019-08-16 | End: 2022-05-10 | Stop reason: SDUPTHER

## 2019-08-22 ENCOUNTER — OFFICE VISIT (OUTPATIENT)
Dept: INTERNAL MEDICINE | Facility: CLINIC | Age: 69
End: 2019-08-22

## 2019-08-22 VITALS
OXYGEN SATURATION: 99 % | TEMPERATURE: 98.1 F | WEIGHT: 213 LBS | BODY MASS INDEX: 33.43 KG/M2 | SYSTOLIC BLOOD PRESSURE: 138 MMHG | RESPIRATION RATE: 16 BRPM | HEART RATE: 66 BPM | DIASTOLIC BLOOD PRESSURE: 82 MMHG | HEIGHT: 67 IN

## 2019-08-22 DIAGNOSIS — F41.8 DEPRESSION WITH ANXIETY: Primary | ICD-10-CM

## 2019-08-22 PROCEDURE — 99212 OFFICE O/P EST SF 10 MIN: CPT | Performed by: NURSE PRACTITIONER

## 2019-08-22 RX ORDER — DESVENLAFAXINE SUCCINATE 50 MG/1
50 TABLET, EXTENDED RELEASE ORAL DAILY
Qty: 90 TABLET | Refills: 3 | Status: SHIPPED | OUTPATIENT
Start: 2019-08-22 | End: 2020-02-14 | Stop reason: SDUPTHER

## 2019-08-22 NOTE — PROGRESS NOTES
"Chief Complaint   Patient presents with   • Follow-up   • Hyperlipidemia   • Depression       Subjective     Renae Schmitz is a 68 y.o. female being seen for a follow up appointment today regarding depression. She was in the office for her AWV and she was complaining of Depression and anxiety being poorly controlled. She has a 30 yr history of depression, but had worsened for several months. She was tapered off her fluoxetine, and started on Prstiq 50 mg daily.  She reports \"I am ding well.\" She states \"I no longer feel doom and gloom.\" She does not have anyside effects of the medicaionts.       History of Present Illness     Allergies   Allergen Reactions   • Tegaderm Ag Mesh [Silver] Other (See Comments)     SKIN BLISTERS  SKIN BLISTERS   • Shingrix [Zoster Vac Recomb Adjuvanted] Other (See Comments)     \"ice water sensation in legs\"   • Sulfa Antibiotics Rash   • Zithromax [Azithromycin] Rash         Current Outpatient Medications:   •  desvenlafaxine (PRISTIQ) 50 MG 24 hr tablet, Take 1 tablet by mouth Daily., Disp: 30 tablet, Rfl: 1  •  Loratadine (CLARITIN) 10 MG capsule, Take 10 mg by mouth Daily., Disp: 90 each, Rfl: 0  •  rosuvastatin (CRESTOR) 20 MG tablet, Take 1 tablet by mouth Daily., Disp: 90 tablet, Rfl: 1  •  vitamin B-12 (CYANOCOBALAMIN) 1000 MCG tablet, Take 1 tablet by mouth Daily., Disp: 12 tablet, Rfl: 3  •  vitamin D (ERGOCALCIFEROL) 51405 units capsule capsule, Take 1 capsule by mouth 1 (One) Time Per Week., Disp: 12 capsule, Rfl: 3  •  busPIRone (BUSPAR) 10 MG tablet, Take 10 mg by mouth Daily As Needed., Disp: , Rfl:   •  tamoxifen (NOLVADEX) 20 MG chemo tablet, Take 1 tablet by mouth Daily. (Patient taking differently: Take 20 mg by mouth Daily. Starting 9/1/19), Disp: 90 tablet, Rfl: 1    The following portions of the patient's history were reviewed and updated as appropriate: allergies, current medications, past family history, past medical history, past social history, past surgical " history and problem list.    Review of Systems   Constitutional: Negative.    Eyes: Negative.    Cardiovascular: Negative for chest pain and palpitations.   Endocrine: Negative.    Musculoskeletal: Negative.    Skin: Negative.    Allergic/Immunologic: Negative.    Neurological: Negative.    Psychiatric/Behavioral: Negative for agitation, decreased concentration, dysphoric mood, hallucinations and sleep disturbance. The patient is not nervous/anxious.        Assessment     Physical Exam   Constitutional: She is oriented to person, place, and time. She appears well-developed and well-nourished.   Neck: Neck supple.   Cardiovascular: Normal rate, regular rhythm and normal heart sounds.   No murmur heard.  Pulmonary/Chest: Effort normal and breath sounds normal. No stridor. No respiratory distress.   Neurological: She is alert and oriented to person, place, and time.   Skin: Skin is warm and dry.   Psychiatric: She has a normal mood and affect. Her behavior is normal. Thought content normal.   Vitals reviewed.      Plan         Renae was seen today for follow-up, hyperlipidemia and depression.    Diagnoses and all orders for this visit:    Depression with anxiety  -     desvenlafaxine (PRISTIQ) 50 MG 24 hr tablet; Take 1 tablet by mouth Daily.      Follow up in 11 months with SAEED

## 2019-09-26 ENCOUNTER — OFFICE VISIT (OUTPATIENT)
Dept: ORTHOPEDIC SURGERY | Facility: CLINIC | Age: 69
End: 2019-09-26

## 2019-09-26 VITALS — HEIGHT: 67 IN | WEIGHT: 213 LBS | BODY MASS INDEX: 33.43 KG/M2

## 2019-09-26 DIAGNOSIS — C50.919 MALIGNANT NEOPLASM OF FEMALE BREAST, UNSPECIFIED ESTROGEN RECEPTOR STATUS, UNSPECIFIED LATERALITY, UNSPECIFIED SITE OF BREAST (HCC): ICD-10-CM

## 2019-09-26 DIAGNOSIS — M70.61 TROCHANTERIC BURSITIS OF RIGHT HIP: ICD-10-CM

## 2019-09-26 DIAGNOSIS — M85.60 BONE CYST: ICD-10-CM

## 2019-09-26 DIAGNOSIS — R52 PAIN: Primary | ICD-10-CM

## 2019-09-26 PROCEDURE — 99203 OFFICE O/P NEW LOW 30 MIN: CPT | Performed by: ORTHOPAEDIC SURGERY

## 2019-09-26 PROCEDURE — 73502 X-RAY EXAM HIP UNI 2-3 VIEWS: CPT | Performed by: ORTHOPAEDIC SURGERY

## 2019-09-26 RX ORDER — MELOXICAM 7.5 MG/1
7.5 TABLET ORAL DAILY
Qty: 30 TABLET | Refills: 5 | Status: SHIPPED | OUTPATIENT
Start: 2019-09-26 | End: 2019-10-17

## 2019-09-26 NOTE — PROGRESS NOTES
Subjective: Bilateral hip pain right worse than left     Patient ID: Renae Schmitz is a 68 y.o. female.    Chief Complaint:    History of Present Illness 5-year-old female seen by me today for the first time for evaluation of primarily right hip pain but has had bilateral hip pain for over a year but in the past 2 months the right hip is become most symptomatic describing constant aching stabbing pain that is 5 out of 10.  Past medical history significant in that she has had breast cancer is had surgery in the treated with chemotherapy and is currently taking tamoxifen.  The pain in the right hip is lateral and radiates down to the level of the knee.  Some groin pain on both sides most prominent on the right side.  Is not taking any anti-inflammatory medication.       Social History     Occupational History     Employer: RETIRED   Tobacco Use   • Smoking status: Never Smoker   • Smokeless tobacco: Never Used   Substance and Sexual Activity   • Alcohol use: No   • Drug use: No   • Sexual activity: Yes     Partners: Male     Birth control/protection: Surgical      Review of Systems      Past Medical History:   Diagnosis Date   • Anemia     prior to hysterectomy several years ago   • Anxiety    • Benign thyroid cyst 1971   • Breast CA (CMS/HCC) 12/01/2016    Right lumpectomy   • Depression    • Fibromyalgia    • Fracture of wrist    • H/O mammogram 12/04/2018   • Heart murmur    • History of ankle fracture     Right   • History of Papanicolaou smear of cervix 2016    Dr. Johnson   • Hypercholesterolemia    • Hyperlipidemia    • Mitral valve regurgitation    • Mood swings    • MARSHALL on CPAP 1/22/2019   • PONV (postoperative nausea and vomiting)    • Sleep apnea      Past Surgical History:   Procedure Laterality Date   • BREAST BIOPSY     • BREAST LUMPECTOMY WITH SENTINEL NODE BIOPSY AND AXILLARY NODE DISSECTION Right 12/13/2016    Procedure: RT BREAST LUMPECTOMY WITH SENTINEL NODE BIOPSY & MAMMO NEEDLE LOC;  Surgeon:  Matt Troncoso MD;  Location: [facility] OR [facility] [place];  Service:    • COLONOSCOPY N/A 10/10/2018    Procedure: COLONOSCOPY TO CECUM AND TERMINAL ILEUM WITH COLD BIOPSIES;  Surgeon: Carl Salas MD;  Location: [facility] ENDOSCOPY;  Service: Gastroenterology   • HAND SURGERY Left 2003    2 FINGERS PINNED   • HYSTERECTOMY  1995    Complete Sept 1995   • PERCUTANEOUS PINNING FINGER FRACTURE     • THYROID CYST EXCISION  1971   • TUBAL ABDOMINAL LIGATION       Family History   Problem Relation Age of Onset   • Heart failure Mother    • Colon cancer Father    • Liver cancer Father    • Tuberculosis Father    • Breast cancer Sister 58        DCIS   • Depression Daughter          Objective:  There were no vitals filed for this visit.      09/26/19  1013   Weight: 96.6 kg (213 lb)     Body mass index is 33.36 kg/m².        Ortho Exam   AP of the pelvis and lateral of the right hip show some mild sclerosis involving the acetabulum.  The AP of the left hip shows a large cystic lesion in the left femoral head involving at least one third of the femoral head.  He does have some arthritis involving the lumbar spine noted at L5-S1.  No prior x-rays available for comparison.  He is alert and oriented x3.  Head is normocephalic and sclerae clear.  On exam there is no motor deficit in either lower extremity.  Minimal pain with passive internal and external rotation of the hip and internal rotation of both legs past a neutral position elicits his mild pain primarily in the right side.  She does have a mildly positive Stinchfield test of both legs more prominent on the right side than the left.  Negative straight leg raise.  Marked pain to palpation over the right greater trochanter and pain with abduction against resistance and a positive Zack's test on the right side.  Left hip greater trochanter is negative Zack's test and to palpation.  The skin is cool to touch.  Quad function is 5/5.  Hip flexors and extensors are 5/5.  She has good  distal pulses no motor or sensory deficit her calves are nontender she has good capillary refill.  She is not taking any anti-inflammatories at this time as tolerated them in the past without any GI side effect.    Assessment:        1. Pain    2. Trochanteric bursitis of right hip    3. Malignant neoplasm of female breast, unspecified estrogen receptor status, unspecified laterality, unspecified site of breast (CMS/HCC)    4. Bone cyst           Plan: Over 20 minutes was spent the patient face-to-face reviewing her x-rays her history and physical findings.  The most prominent problem the right side is a trochanteric bursitis of that she does have some evidence of arthritic changes in the hip.  My concern reviewing the x-rays is a large cyst seen on the left hip.  Does not appear to be metastatic but I do want to get an MRI to fully evaluate that hip because of it is a large cyst as it appears I think she is at risk for impending fracture.  The right hip trochanteric bursitis have offered a cortisone injection in July to try the meloxicam first and then prescription was sent to her pharmacy instructions given on taking it once a day.  She is can return in 3 weeks the results of the MRI that have been evaluated right hips response to meloxicam.  She not showing significant improvement we can consider cortisone injection or physical therapy.  Answered all questions patient was in agreement            Work Status:    LUISA query complete.    Orders:  Orders Placed This Encounter   Procedures   • XR Hip With or Without Pelvis 2 - 3 View Right   • MRI Hip Left Without Contrast       Medications:  New Medications Ordered This Visit   Medications   • meloxicam (MOBIC) 7.5 MG tablet     Sig: Take 1 tablet by mouth Daily.     Dispense:  30 tablet     Refill:  5       Followup:  Return in about 3 weeks (around 10/17/2019).          Dictated utilizing Dragon dictation

## 2019-10-07 ENCOUNTER — HOSPITAL ENCOUNTER (OUTPATIENT)
Dept: MRI IMAGING | Facility: HOSPITAL | Age: 69
Discharge: HOME | End: 2019-10-07
Admitting: ORTHOPAEDIC SURGERY

## 2019-10-07 DIAGNOSIS — M85.60 BONE CYST: ICD-10-CM

## 2019-10-07 DIAGNOSIS — C50.919 MALIGNANT NEOPLASM OF FEMALE BREAST, UNSPECIFIED ESTROGEN RECEPTOR STATUS, UNSPECIFIED LATERALITY, UNSPECIFIED SITE OF BREAST (HCC): ICD-10-CM

## 2019-10-07 PROCEDURE — 73721 MRI JNT OF LWR EXTRE W/O DYE: CPT

## 2019-10-17 ENCOUNTER — OFFICE VISIT (OUTPATIENT)
Dept: ORTHOPEDIC SURGERY | Facility: CLINIC | Age: 69
End: 2019-10-17

## 2019-10-17 VITALS — WEIGHT: 213 LBS | BODY MASS INDEX: 33.43 KG/M2 | HEIGHT: 67 IN

## 2019-10-17 DIAGNOSIS — M85.60 BONE CYST: ICD-10-CM

## 2019-10-17 DIAGNOSIS — R52 PAIN: Primary | ICD-10-CM

## 2019-10-17 DIAGNOSIS — C50.919 MALIGNANT NEOPLASM OF FEMALE BREAST, UNSPECIFIED ESTROGEN RECEPTOR STATUS, UNSPECIFIED LATERALITY, UNSPECIFIED SITE OF BREAST (HCC): ICD-10-CM

## 2019-10-17 DIAGNOSIS — M70.61 TROCHANTERIC BURSITIS OF RIGHT HIP: ICD-10-CM

## 2019-10-17 PROCEDURE — 99211 OFF/OP EST MAY X REQ PHY/QHP: CPT | Performed by: ORTHOPAEDIC SURGERY

## 2019-10-17 RX ORDER — MELOXICAM 7.5 MG/1
7.5 TABLET ORAL DAILY
Qty: 90 TABLET | Refills: 3 | Status: SHIPPED | OUTPATIENT
Start: 2019-10-17 | End: 2020-01-13 | Stop reason: SDUPTHER

## 2019-10-17 NOTE — PROGRESS NOTES
Subjective: Hip pain     Patient ID: Renae Schmitz is a 69 y.o. female.    Chief Complaint:    History of Present Illness 69-year-old female returns with her daughter to review the results of the MRI completed of her left hip.  She was treated with meloxicam for the trochanteric bursitis of the right hip and that is resolved.  Again she is not having any significant hip pain on the left side but is cyst was noted on the x-ray and with her medical history and MRI was completed.  I reviewed the MRI images and the report which shows a benign cyst more than likely a bony lipoma.       Social History     Occupational History     Employer: RETIRED   Tobacco Use   • Smoking status: Never Smoker   • Smokeless tobacco: Never Used   Substance and Sexual Activity   • Alcohol use: No   • Drug use: No   • Sexual activity: Yes     Partners: Male     Birth control/protection: Surgical      Review of Systems   Constitutional: Negative for chills, diaphoresis, fever and unexpected weight change.   HENT: Negative for hearing loss, nosebleeds, sore throat and tinnitus.    Eyes: Negative for pain and visual disturbance.   Respiratory: Negative for cough, shortness of breath and wheezing.    Cardiovascular: Negative for chest pain and palpitations.   Gastrointestinal: Negative for abdominal pain, diarrhea, nausea and vomiting.   Endocrine: Negative for cold intolerance, heat intolerance and polydipsia.   Genitourinary: Negative for difficulty urinating, dysuria and hematuria.   Musculoskeletal: Positive for arthralgias and myalgias. Negative for joint swelling.   Skin: Negative for rash and wound.   Allergic/Immunologic: Negative for environmental allergies.   Neurological: Negative for dizziness, syncope and numbness.   Hematological: Does not bruise/bleed easily.   Psychiatric/Behavioral: Negative for dysphoric mood and sleep disturbance. The patient is not nervous/anxious.          Past Medical History:   Diagnosis Date   • Anemia      prior to hysterectomy several years ago   • Anxiety    • Benign thyroid cyst 1971   • Breast CA (CMS/HCC) 12/01/2016    Right lumpectomy   • Depression    • Fibromyalgia    • Fracture of wrist    • H/O mammogram 12/04/2018   • Heart murmur    • History of ankle fracture     Right   • History of Papanicolaou smear of cervix 2016    Dr. Johnson   • Hypercholesterolemia    • Hyperlipidemia    • Mitral valve regurgitation    • Mood swings    • MARSHALL on CPAP 1/22/2019   • PONV (postoperative nausea and vomiting)    • Sleep apnea      Past Surgical History:   Procedure Laterality Date   • BREAST BIOPSY     • BREAST LUMPECTOMY WITH SENTINEL NODE BIOPSY AND AXILLARY NODE DISSECTION Right 12/13/2016    Procedure: RT BREAST LUMPECTOMY WITH SENTINEL NODE BIOPSY & MAMMO NEEDLE LOC;  Surgeon: Matt Troncoso MD;  Location: [facility] OR [facility] [place];  Service:    • COLONOSCOPY N/A 10/10/2018    Procedure: COLONOSCOPY TO CECUM AND TERMINAL ILEUM WITH COLD BIOPSIES;  Surgeon: Carl Salas MD;  Location: [facility] ENDOSCOPY;  Service: Gastroenterology   • HAND SURGERY Left 2003    2 FINGERS PINNED   • HYSTERECTOMY  1995    Complete Sept 1995   • PERCUTANEOUS PINNING FINGER FRACTURE     • THYROID CYST EXCISION  1971   • TUBAL ABDOMINAL LIGATION       Family History   Problem Relation Age of Onset   • Heart failure Mother    • Colon cancer Father    • Liver cancer Father    • Tuberculosis Father    • Breast cancer Sister 58        DCIS   • Depression Daughter          Objective:  There were no vitals filed for this visit.      10/17/19  0938   Weight: 96.6 kg (213 lb)     Body mass index is 33.36 kg/m².        Ortho Exam   She is alert and oriented x3 again she is having no left hip pain per se.  She does state that she walks long distances both hip make a but she is not having any groin pain no long track signs there is minimal tenderness over the greater trochanter.    Assessment:        1. Pain    2. Trochanteric bursitis of right hip    3.  Bone cyst    4. 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  No indication for the MRI that this is malignant.  My recommendation however is to contact her oncologist to let her know what transpired that she was seen by me and an MRI was ordered and the MRI showed what appears to be a benign fatty lipoma of the hip.  Leave it up to the oncologist with any further work-up as needed.  As far as the hip pain she can continue to take the meloxicam for the trochanteric bursitis.  She was instructed to call should she develop any increase in the left hip pain which may be an indication of impending fracture collapse of the cyst.  Answered all questions.  Return to see me as needed but again I emphasized she needs to contact her oncologist to give her an update on the MRI report and to call me should any increasing pain develop in that left hip.            Work Status:    LUISA query complete.    Orders:  No orders of the defined types were placed in this encounter.      Medications:  No orders of the defined types were placed in this encounter.      Followup:  Return if symptoms worsen or fail to improve.          Dictated utilizing Dragon dictation

## 2019-10-18 ENCOUNTER — TELEPHONE (OUTPATIENT)
Dept: ONCOLOGY | Facility: HOSPITAL | Age: 69
End: 2019-10-18

## 2019-10-18 NOTE — TELEPHONE ENCOUNTER
"----- Message from Jil Childers sent at 10/18/2019  3:59 PM EDT -----  Contact: [phone]  Pt says she she has a tumor on her hip      Pt called stating that she went to her orthopedic MD for hip pain. 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 Correa thought about that. Informed pt I would message her to see what she thought. Results are in Epic. Message sent to Dr. Correa.   "

## 2019-10-23 ENCOUNTER — TELEPHONE (OUTPATIENT)
Dept: ONCOLOGY | Facility: HOSPITAL | Age: 69
End: 2019-10-23

## 2019-10-23 DIAGNOSIS — R93.5 ABNORMAL MRI, PELVIS: Primary | ICD-10-CM

## 2019-10-23 DIAGNOSIS — Z85.3 PERSONAL HISTORY OF MALIGNANT NEOPLASM OF BREAST: ICD-10-CM

## 2019-10-23 NOTE — TELEPHONE ENCOUNTER
Pt called back to see if Dr. Correa could look at the MRI of her left hip. There was a lipoma that Dr. Watkins was concerned about. D/W Dr. Correa. Per Dr. Correa, order a PET scan to better visualize. Informed pt and she v/u. Message sent to scheduling and informatics.

## 2019-10-28 ENCOUNTER — HOSPITAL ENCOUNTER (OUTPATIENT)
Dept: PET IMAGING | Facility: HOSPITAL | Age: 69
Discharge: HOME | End: 2019-10-28

## 2019-10-28 ENCOUNTER — HOSPITAL ENCOUNTER (OUTPATIENT)
Dept: PET IMAGING | Facility: HOSPITAL | Age: 69
End: 2019-10-28

## 2019-10-28 DIAGNOSIS — Z85.3 PERSONAL HISTORY OF MALIGNANT NEOPLASM OF BREAST: ICD-10-CM

## 2019-10-28 DIAGNOSIS — R93.5 ABNORMAL MRI, PELVIS: ICD-10-CM

## 2019-10-31 ENCOUNTER — HOSPITAL ENCOUNTER (OUTPATIENT)
Dept: PET IMAGING | Facility: HOSPITAL | Age: 69
Discharge: HOME | End: 2019-10-31
Admitting: INTERNAL MEDICINE

## 2019-10-31 ENCOUNTER — HOSPITAL ENCOUNTER (OUTPATIENT)
Dept: PET IMAGING | Facility: HOSPITAL | Age: 69
Discharge: HOME | End: 2019-10-31

## 2019-10-31 LAB — GLUCOSE BLDC GLUCOMTR-MCNC: 94 MG/DL (ref 70–130)

## 2019-10-31 PROCEDURE — 0 FLUDEOXYGLUCOSE F18 SOLUTION: Performed by: INTERNAL MEDICINE

## 2019-10-31 PROCEDURE — 82962 GLUCOSE BLOOD TEST: CPT

## 2019-10-31 PROCEDURE — A9552 F18 FDG: HCPCS | Performed by: INTERNAL MEDICINE

## 2019-10-31 PROCEDURE — 78815 PET IMAGE W/CT SKULL-THIGH: CPT

## 2019-10-31 RX ADMIN — FLUDEOXYGLUCOSE F18 1 DOSE: 300 INJECTION INTRAVENOUS at 12:14

## 2019-11-04 NOTE — PROGRESS NOTES
Subjective   REASON FOR FOLLOWUP:    1.Right breast cancer T1c N0 ER/MA positive HER-2 negative  2.  Oncotype score of 20  3.  Radiation and Arimidex planned bone density shows osteopenia-Arimidex started in 4/17                               REQUESTING PHYSICIAN:  Trino Troncoso M.D.        History of Present Illness patient is a 58 a lady with a  right breast cancer T1 CN 0 ER/MA positive HER-2 negative here for follow-up .  At her last visit because of worsening bone density was switch her to tamoxifen and switched off from Prozac to Pristiq and she appears to be tolerating this better with less joint pains.    She was seen by orthopedics because of pain in her hip and when he imaged her hips he found what looked like a lipoma in the left hip her pain was actually in the right hip and they were concerned because of her history of breast cancer that there might be something malignant so we scheduled a PET scan and she is here today for follow-up    Thankfully the lesion in the left femur looks like a lipoma and has no uptake on PET scan but incidentally she was found to have uptake in mediastinal nodes and some calcified and noncalcified lesions in her lung that appeared to be granulomatous disease.  She has never complained to me but her daughter states that she coughs all the time and therefore I think a pulmonary evaluation is required and we will send testing for histoplasmosis but it could also be Mycobacterium avium intracellulare.  There is mention of discrete uptake in the inferior right hepatic lobe with no corresponding lesion and I think we will wait for a while and repeat an MRI of the liver but some    She will continue on tamoxifen for the time being    Surgeon does not want to see her unless she has pain in the left hip at which time replacement would be indicated because of risk of fracture from the lipoma narrowing the cortex      Past Medical History:   Diagnosis Date   • Anemia     prior to  hysterectomy several years ago   • Anxiety    • Benign thyroid cyst 1971   • Breast CA (CMS/HCC) 12/01/2016    Right lumpectomy   • Depression    • Fibromyalgia    • Fracture of wrist    • H/O mammogram 12/04/2018   • Heart murmur    • History of ankle fracture     Right   • History of Papanicolaou smear of cervix 2016    Dr. Johnson   • Hypercholesterolemia    • Hyperlipidemia    • Mitral valve regurgitation    • Mood swings    • MARSHALL on CPAP 1/22/2019   • PONV (postoperative nausea and vomiting)    • Sleep apnea         Past Surgical History:   Procedure Laterality Date   • BREAST BIOPSY     • BREAST LUMPECTOMY WITH SENTINEL NODE BIOPSY AND AXILLARY NODE DISSECTION Right 12/13/2016    Procedure: RT BREAST LUMPECTOMY WITH SENTINEL NODE BIOPSY & MAMMO NEEDLE LOC;  Surgeon: aMtt Troncoso MD;  [place] [facility] [place] [facility] [place]:    • COLONOSCOPY N/A 10/10/2018    Procedure: COLONOSCOPY TO CECUM AND TERMINAL ILEUM WITH COLD BIOPSIES;  Surgeon: Carl Salas MD;  Location: [facility] ENDOSCOPY;  Service: Gastroenterology   • HAND SURGERY Left 2003    2 FINGERS PINNED   • HYSTERECTOMY  1995    Complete Sept 1995   • PERCUTANEOUS PINNING FINGER FRACTURE     • THYROID CYST EXCISION  1971   • TUBAL ABDOMINAL LIGATION        ONCOLOGIC HISTORY:   patient is a 66-year-old lady with a long history of depression hypercholesterolemia who was noted on routine mammogram on 11/9/16 to have an abnormality in the right breast at the 12 o'clock position.  This is not seen on a previous mammogram a year ago and this led to a diagnostic mammogram and ultrasound which confirmed a mass 12 o'clock position the right breast measuring about 1 cm by ultrasound with normal appearing right axillary lymph nodes.  A biopsy was done on 11/21/13 which revealed a grade 2 infiltrating ductal carcinoma ER/NV strongly positive HER-2 negative and the patient was referred to Dr. Troncoso.  Dr. Troncoso scheduled MRI of both breasts on 12/8/16 and this revealed a  1.8 x 1.3 cm mass in the right breast with no other abnormalities in the axilla or in the left breast and the patient was taken to surgery on 16 which time she had a needle localization lumpectomy and sentinel node biopsy.  Final pathology showed a 1.5 x 1.3 cm grade 2 infiltrating ductal carcinoma  With associated DCIS and margins that were clear after reexcision and one negative sentinel node.  Patient is on well since surgery and is here to discuss adjuvant chemotherapy options.  She was  3 para 3 first childbirth was at night age 17 she did not breast-feed her children.  Menarche  at age 11 and menopause at age 45 when she had a hysterectomy and oophorectomy.  She took hormonal therapy for 10 years and stopped in  when her sister had breast cancer she was on birth control pills for at least 20 years prior to her hysterectomy.  She has a sister who developed breast cancer at age 58 and her second cancer  although one of these was DCIS and the other invasive cancer.  She took tamoxifen for 5 years  Is no other breast or ovarian cancer in the family but she has a very limited family as her father was an only child and her mother had 1 brother and she has one sister  Oncotype score returned at 20 and after looking at the risks and benefits of chemotherapy in this subset which I explained was not well understood at this point pending results of the [employer] trial we have opted to give her Arimidex and start radiation.  .    she went for genetic testing but she did not meet the criteria for testing and no further workup was done.  We talked again about long-term effects of Arimidex.  Her mood appears to be better with the higher dose of Prozac and BuSpar when necessary.  Her mother-in-law who is very difficult in general is staying with them the last 4 years and this is causing more stress for her but she realizes there is no other option          Current Outpatient Medications on File Prior to  "Visit   Medication Sig Dispense Refill   • busPIRone (BUSPAR) 10 MG tablet Take 10 mg by mouth Daily As Needed.     • desvenlafaxine (PRISTIQ) 50 MG 24 hr tablet Take 1 tablet by mouth Daily. 90 tablet 3   • Loratadine (CLARITIN) 10 MG capsule Take 10 mg by mouth Daily. 90 each 0   • meloxicam (MOBIC) 7.5 MG tablet Take 1 tablet by mouth Daily. 90 tablet 3   • rosuvastatin (CRESTOR) 20 MG tablet Take 1 tablet by mouth Daily. 90 tablet 1   • tamoxifen (NOLVADEX) 20 MG chemo tablet Take 1 tablet by mouth Daily. (Patient taking differently: Take 20 mg by mouth Daily. Starting 9/1/19) 90 tablet 1   • vitamin B-12 (CYANOCOBALAMIN) 1000 MCG tablet Take 1 tablet by mouth Daily. 12 tablet 3   • vitamin D (ERGOCALCIFEROL) 88074 units capsule capsule Take 1 capsule by mouth 1 (One) Time Per Week. 12 capsule 3     No current facility-administered medications on file prior to visit.         ALLERGIES:    Allergies   Allergen Reactions   • Tegaderm Ag Mesh [Silver] Other (See Comments)     SKIN BLISTERS  SKIN BLISTERS   • Shingrix [Zoster Vac Recomb Adjuvanted] Other (See Comments)     \"ice water sensation in legs\"   • Sulfa Antibiotics Rash   • Zithromax [Azithromycin] Rash        Social History     Socioeconomic History   • Marital status:      Spouse name: Peter   • Number of children: Not on file   • Years of education: High school   • Highest education level: Not on file   Occupational History     Employer: RETIRED   Tobacco Use   • Smoking status: Never Smoker   • Smokeless tobacco: Never Used   Substance and Sexual Activity   • Alcohol use: No   • Drug use: No   • Sexual activity: Yes     Partners: Male     Birth control/protection: Surgical   Social History Narrative     is Peter.    Patient retired from a bank and factory work. [place], has [place] acres.        2 Natural daughters; 1 step-daughter and    1 Son    She watches her grand babies.         She has a flower garden.         Family History "   Problem Relation Age of Onset   • Heart failure Mother    • Colon cancer Father    • Liver cancer Father    • Tuberculosis Father    • Breast cancer Sister 58        DCIS   • Depression Daughter       Father  at 65 of metastatic colon cancer his parents lived to their 80s without cancer mother  at 93 of CHF and had only one brother who had no cancer.  She has only one sister who had breast cancer at age 58 and again at 63 no ovarian cancer in the family  Review of Systems   Constitutional: Positive for fatigue (19). Negative for activity change, appetite change, chills, fever and unexpected weight change.   Eyes: Positive for visual disturbance (cataract).   Respiratory: Negative for cough, chest tightness and shortness of breath.    Cardiovascular: Negative for chest pain and leg swelling.   Gastrointestinal: Negative for constipation, diarrhea, nausea and vomiting.   Genitourinary: Negative for difficulty urinating.   Musculoskeletal: Positive for arthralgias, back pain (after a period of standing 19) and gait problem (hips 19). Negative for joint swelling and neck pain.        TMJ   Neurological: Negative for dizziness and headaches.   Psychiatric/Behavioral: Positive for dysphoric mood (19 starting new meds tonight). The patient is nervous/anxious (worse 19).         Objective     There were no vitals filed for this visit.  Current Status 2019   ECOG score 0       Physical Exam    GENERAL:  Well-developed, well-nourished in no acute distress.   SKIN:  Warm, dry without rashes, purpura or petechiae.  EYES:  Pupils equal, round and reactive to light.  EOMs intact.  Conjunctivae normal.  EARS:  Hearing intact.  NOSE:  Septum midline.  No excoriations or nasal discharge.  MOUTH:  Tongue is well-papillated; no stomatitis or ulcers.  Lips normal.  THROAT:  Oropharynx without lesions or exudates.  NECK:  Supple with good range of motion; no thyromegaly or masses, no JVD.  LYMPHATICS:   No cervical, supraclavicular, axillary or inguinal adenopathy.  CHEST:  Lungs clear to auscultation. Good airflow.  BREASTS: Both breasts are benign lumpectomy scar in the right breast is well-healed but   CARDIAC:  Regular rate and rhythm without murmurs, rubs or gallops. Normal S1,S2.  ABDOMEN:  Soft, nontender with no hepatosplenomegaly or masses.  EXTREMITIES:  No clubbing, cyanosis or edema.  NEUROLOGICAL:  Cranial Nerves II-XII grossly intact.  No focal neurological deficits.  PSYCHIATRIC:  Normal affect and mood.        RECENT LABS:  Hematology WBC   Date Value Ref Range Status   08/01/2019 6.42 3.40 - 10.80 10*3/mm3 Final   07/23/2019 5.20 3.40 - 10.80 10*3/mm3 Final     RBC   Date Value Ref Range Status   08/01/2019 4.47 3.77 - 5.28 10*6/mm3 Final   07/23/2019 4.25 3.77 - 5.28 10*6/mm3 Final     Hemoglobin   Date Value Ref Range Status   08/01/2019 13.5 12.0 - 15.9 g/dL Final     Hematocrit   Date Value Ref Range Status   08/01/2019 40.4 34.0 - 46.6 % Final     Platelets   Date Value Ref Range Status   08/01/2019 258 140 - 450 10*3/mm3 Final      Dexa Scan      Study Result     F-18 FDG PET FROM SKULL BASE TO MID THIGH WITH PET/CT FUSION   IMPRESSION:  1.  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  2.  Focal moderate FDG uptake within the inferior right hepatic lobe  without a corresponding lesion seen on noncontrast CT. While findings  may be artifactual and represent displaced activity from the underlying  bowel, findings remain nonspecific and further evaluation with abdominal  MRI with and without contrast with Eovist is recommended to exclude  metastatic disease in this location.  3.  Moderate to intense FDG avid right hilar and mediastinal lymph nodes  which demonstrates discrete calcification and are likely granulomatous.  Focus of moderate to intense FDG uptake within the left hilum is  present  without definite calcification seen on corresponding noncontrast CT.  Given the findings within the remainder of the mediastinum and hilum,  findings are favored to be granulomatous. However, findings remain  nonspecific. Correlation with prior imaging is recommended if available  to establish long-term stability. If insufficient prior imaging is  available, recommend follow-up chest CT with contrast in 2 months to  ensure stability.  4.  Focus of intense FDG uptake within the right anterior gluteal  musculature and abutting the right iliac crest anteriorly. No  corresponding lesion is seen on noncontrast CT. While findings may be  reactive, they are nonspecific on PET and correlation with recent pelvic  MRI is recommended to determine if this area was included within the  field-of-view and if an underlying lesion is visualized. If this area  was not adequately evaluated on recent pelvic MRI, I recommend follow-up  pelvic MRI with and without contrast to exclude an underlying lesion in  this location.            Assessment/Plan   1.  T1 CN 0M0 ER/MA positive HER-2 negative right-sided breast cancer  ·  oncotype score of 20 -Arimidex plus radiation planned started in 3/17  · Worsening bone density in 3/19 switch to tamoxifen in 8/19      2.  Mild mitral regurgitation  3 long history of depression on Prozac-Dose increased recently with improvement-change to Pristiq in 4/19  4.  Positive family history of breast cancer in her sister and colon cancer in her father the very small immediate family-saw the genetic counselors but they do not feel testing was appropriate  5.  Worsening bone density in 3/19    Plan  1.  discontinue Arimidex 1  2.  Tamoxifen 20 mg daily   3.return in 6 months   I encouraged her to call if she has any undue side effects from the tamoxifen and stressed the toxicity profile again

## 2019-11-05 ENCOUNTER — OFFICE VISIT (OUTPATIENT)
Dept: ONCOLOGY | Facility: CLINIC | Age: 69
End: 2019-11-05

## 2019-11-05 ENCOUNTER — LAB (OUTPATIENT)
Dept: LAB | Facility: HOSPITAL | Age: 69
End: 2019-11-05

## 2019-11-05 VITALS
HEART RATE: 72 BPM | OXYGEN SATURATION: 100 % | TEMPERATURE: 98.2 F | RESPIRATION RATE: 16 BRPM | SYSTOLIC BLOOD PRESSURE: 135 MMHG | WEIGHT: 218.6 LBS | HEIGHT: 67 IN | DIASTOLIC BLOOD PRESSURE: 84 MMHG | BODY MASS INDEX: 34.31 KG/M2

## 2019-11-05 DIAGNOSIS — Z17.0 MALIGNANT NEOPLASM OF BREAST IN FEMALE, ESTROGEN RECEPTOR POSITIVE, UNSPECIFIED LATERALITY, UNSPECIFIED SITE OF BREAST (HCC): Primary | ICD-10-CM

## 2019-11-05 DIAGNOSIS — C50.919 MALIGNANT NEOPLASM OF BREAST IN FEMALE, ESTROGEN RECEPTOR POSITIVE, UNSPECIFIED LATERALITY, UNSPECIFIED SITE OF BREAST (HCC): ICD-10-CM

## 2019-11-05 DIAGNOSIS — C50.919 MALIGNANT NEOPLASM OF BREAST IN FEMALE, ESTROGEN RECEPTOR POSITIVE, UNSPECIFIED LATERALITY, UNSPECIFIED SITE OF BREAST (HCC): Primary | ICD-10-CM

## 2019-11-05 DIAGNOSIS — Z17.0 MALIGNANT NEOPLASM OF BREAST IN FEMALE, ESTROGEN RECEPTOR POSITIVE, UNSPECIFIED LATERALITY, UNSPECIFIED SITE OF BREAST (HCC): ICD-10-CM

## 2019-11-05 LAB
ALBUMIN SERPL-MCNC: 4 G/DL (ref 3.5–5.2)
ALBUMIN/GLOB SERPL: 1.5 G/DL (ref 1.1–2.4)
ALP SERPL-CCNC: 76 U/L (ref 38–116)
ALT SERPL W P-5'-P-CCNC: 10 U/L (ref 0–33)
ANION GAP SERPL CALCULATED.3IONS-SCNC: 13 MMOL/L (ref 5–15)
AST SERPL-CCNC: 16 U/L (ref 0–32)
BASOPHILS # BLD AUTO: 0.03 10*3/MM3 (ref 0–0.2)
BASOPHILS NFR BLD AUTO: 0.5 % (ref 0–1.5)
BILIRUB SERPL-MCNC: 0.4 MG/DL (ref 0.2–1.2)
BUN BLD-MCNC: 22 MG/DL (ref 6–20)
BUN/CREAT SERPL: 31.4 (ref 7.3–30)
CALCIUM SPEC-SCNC: 8.9 MG/DL (ref 8.5–10.2)
CHLORIDE SERPL-SCNC: 108 MMOL/L (ref 98–107)
CO2 SERPL-SCNC: 22 MMOL/L (ref 22–29)
CREAT BLD-MCNC: 0.7 MG/DL (ref 0.6–1.1)
DEPRECATED RDW RBC AUTO: 45.2 FL (ref 37–54)
EOSINOPHIL # BLD AUTO: 0.46 10*3/MM3 (ref 0–0.4)
EOSINOPHIL NFR BLD AUTO: 6.9 % (ref 0.3–6.2)
ERYTHROCYTE [DISTWIDTH] IN BLOOD BY AUTOMATED COUNT: 13.2 % (ref 12.3–15.4)
GFR SERPL CREATININE-BSD FRML MDRD: 82 ML/MIN/1.73
GLOBULIN UR ELPH-MCNC: 2.7 GM/DL (ref 1.8–3.5)
GLUCOSE BLD-MCNC: 99 MG/DL (ref 74–124)
HCT VFR BLD AUTO: 38.8 % (ref 34–46.6)
HGB BLD-MCNC: 12.5 G/DL (ref 12–15.9)
IMM GRANULOCYTES # BLD AUTO: 0.03 10*3/MM3 (ref 0–0.05)
IMM GRANULOCYTES NFR BLD AUTO: 0.5 % (ref 0–0.5)
LYMPHOCYTES # BLD AUTO: 2.43 10*3/MM3 (ref 0.7–3.1)
LYMPHOCYTES NFR BLD AUTO: 36.6 % (ref 19.6–45.3)
MCH RBC QN AUTO: 30 PG (ref 26.6–33)
MCHC RBC AUTO-ENTMCNC: 32.2 G/DL (ref 31.5–35.7)
MCV RBC AUTO: 93.3 FL (ref 79–97)
MONOCYTES # BLD AUTO: 0.59 10*3/MM3 (ref 0.1–0.9)
MONOCYTES NFR BLD AUTO: 8.9 % (ref 5–12)
NEUTROPHILS # BLD AUTO: 3.1 10*3/MM3 (ref 1.7–7)
NEUTROPHILS NFR BLD AUTO: 46.6 % (ref 42.7–76)
NRBC BLD AUTO-RTO: 0 /100 WBC (ref 0–0.2)
PLATELET # BLD AUTO: 220 10*3/MM3 (ref 140–450)
PMV BLD AUTO: 9.3 FL (ref 6–12)
POTASSIUM BLD-SCNC: 4.8 MMOL/L (ref 3.5–4.7)
PROT SERPL-MCNC: 6.7 G/DL (ref 6.3–8)
RBC # BLD AUTO: 4.16 10*6/MM3 (ref 3.77–5.28)
SODIUM BLD-SCNC: 143 MMOL/L (ref 134–145)
WBC NRBC COR # BLD: 6.64 10*3/MM3 (ref 3.4–10.8)

## 2019-11-05 PROCEDURE — 36415 COLL VENOUS BLD VENIPUNCTURE: CPT

## 2019-11-05 PROCEDURE — 80053 COMPREHEN METABOLIC PANEL: CPT

## 2019-11-05 PROCEDURE — 99215 OFFICE O/P EST HI 40 MIN: CPT | Performed by: INTERNAL MEDICINE

## 2019-11-05 PROCEDURE — 85025 COMPLETE CBC W/AUTO DIFF WBC: CPT

## 2019-11-05 PROCEDURE — G0463 HOSPITAL OUTPT CLINIC VISIT: HCPCS | Performed by: INTERNAL MEDICINE

## 2019-11-05 RX ORDER — KETOCONAZOLE 20 MG/ML
SHAMPOO TOPICAL
Refills: 1 | COMMUNITY
Start: 2019-10-27

## 2019-11-05 RX ORDER — MOMETASONE FUROATE 1 MG/ML
SOLUTION TOPICAL
Refills: 1 | COMMUNITY
Start: 2019-10-27

## 2019-11-07 LAB
H CAPSUL AB TITR SER ID: NEGATIVE {TITER}
H CAPSUL AG UR-MCNC: <0.5 NG/ML
Lab: NORMAL

## 2019-12-04 ENCOUNTER — TRANSCRIBE ORDERS (OUTPATIENT)
Dept: ADMINISTRATIVE | Facility: HOSPITAL | Age: 69
End: 2019-12-04

## 2019-12-04 DIAGNOSIS — R59.0 HILAR ADENOPATHY: Primary | ICD-10-CM

## 2019-12-06 RX ORDER — ROSUVASTATIN CALCIUM 20 MG/1
TABLET, COATED ORAL
Qty: 90 TABLET | Refills: 1 | Status: SHIPPED | OUTPATIENT
Start: 2019-12-06 | End: 2020-03-11 | Stop reason: SDUPTHER

## 2020-01-13 RX ORDER — MELOXICAM 7.5 MG/1
7.5 TABLET ORAL DAILY
Qty: 90 TABLET | Refills: 3 | Status: SHIPPED | OUTPATIENT
Start: 2020-01-13 | End: 2021-01-04

## 2020-01-13 NOTE — TELEPHONE ENCOUNTER
Patient calling she has a new mail order pharmacy, that has been updated in the chart, and she would also like an updated RX of Mobic.    Thanks.

## 2020-01-23 ENCOUNTER — APPOINTMENT (OUTPATIENT)
Dept: LAB | Facility: HOSPITAL | Age: 70
End: 2020-01-23

## 2020-02-13 ENCOUNTER — TELEPHONE (OUTPATIENT)
Dept: INTERNAL MEDICINE | Facility: CLINIC | Age: 70
End: 2020-02-13

## 2020-02-13 ENCOUNTER — TELEPHONE (OUTPATIENT)
Dept: ONCOLOGY | Facility: OTHER | Age: 70
End: 2020-02-13

## 2020-02-13 RX ORDER — TAMOXIFEN CITRATE 20 MG/1
20 TABLET ORAL DAILY
Qty: 90 TABLET | Refills: 1 | OUTPATIENT
Start: 2020-02-13 | End: 2020-02-27 | Stop reason: SDUPTHER

## 2020-02-13 NOTE — TELEPHONE ENCOUNTER
Ute Correa MD     Pt called she needs a refill:  tamoxifen (NOLVADEX) 20 MG chemo tablet    [facility]   Fax Num: [phone]   code: [phone]   Please contact pt when done @  [phone]      Thanks

## 2020-02-13 NOTE — TELEPHONE ENCOUNTER
[facility]. Called pt and informed this was done.

## 2020-02-14 DIAGNOSIS — F41.8 DEPRESSION WITH ANXIETY: ICD-10-CM

## 2020-02-14 DIAGNOSIS — M85.852 OSTEOPENIA OF LEFT HIP: ICD-10-CM

## 2020-02-14 RX ORDER — ERGOCALCIFEROL 1.25 MG/1
50000 CAPSULE ORAL WEEKLY
Qty: 12 CAPSULE | Refills: 3 | Status: SHIPPED | OUTPATIENT
Start: 2020-02-14 | End: 2021-02-04

## 2020-02-14 RX ORDER — LANOLIN ALCOHOL/MO/W.PET/CERES
1000 CREAM (GRAM) TOPICAL DAILY
Qty: 12 TABLET | Refills: 3 | Status: CANCELLED
Start: 2020-02-14

## 2020-02-14 RX ORDER — DESVENLAFAXINE SUCCINATE 50 MG/1
50 TABLET, EXTENDED RELEASE ORAL DAILY
Qty: 90 TABLET | Refills: 3 | Status: SHIPPED | OUTPATIENT
Start: 2020-02-14 | End: 2020-02-14 | Stop reason: SDUPTHER

## 2020-02-14 RX ORDER — DESVENLAFAXINE SUCCINATE 50 MG/1
50 TABLET, EXTENDED RELEASE ORAL DAILY
Qty: 90 TABLET | Refills: 3 | Status: CANCELLED | OUTPATIENT
Start: 2020-02-14

## 2020-02-14 RX ORDER — LANOLIN ALCOHOL/MO/W.PET/CERES
1000 CREAM (GRAM) TOPICAL DAILY
Qty: 90 TABLET | Refills: 3
Start: 2020-02-14

## 2020-02-14 RX ORDER — DESVENLAFAXINE SUCCINATE 50 MG/1
50 TABLET, EXTENDED RELEASE ORAL DAILY
Qty: 90 TABLET | Refills: 3 | Status: SHIPPED | OUTPATIENT
Start: 2020-02-14 | End: 2020-02-26

## 2020-02-24 ENCOUNTER — HOSPITAL ENCOUNTER (OUTPATIENT)
Dept: CT IMAGING | Facility: HOSPITAL | Age: 70
Discharge: HOME | End: 2020-02-24
Admitting: INTERNAL MEDICINE

## 2020-02-24 DIAGNOSIS — R59.0 HILAR ADENOPATHY: ICD-10-CM

## 2020-02-24 PROCEDURE — 71250 CT THORAX DX C-: CPT

## 2020-02-26 ENCOUNTER — OFFICE VISIT (OUTPATIENT)
Dept: INTERNAL MEDICINE | Facility: CLINIC | Age: 70
End: 2020-02-26

## 2020-02-26 ENCOUNTER — HOSPITAL ENCOUNTER (OUTPATIENT)
Dept: MRI IMAGING | Facility: HOSPITAL | Age: 70
Discharge: HOME | End: 2020-02-26
Admitting: INTERNAL MEDICINE

## 2020-02-26 VITALS
BODY MASS INDEX: 34.84 KG/M2 | SYSTOLIC BLOOD PRESSURE: 134 MMHG | DIASTOLIC BLOOD PRESSURE: 78 MMHG | OXYGEN SATURATION: 98 % | RESPIRATION RATE: 16 BRPM | HEART RATE: 78 BPM | HEIGHT: 67 IN | WEIGHT: 222 LBS | TEMPERATURE: 98.2 F

## 2020-02-26 DIAGNOSIS — E55.9 VITAMIN D DEFICIENCY: ICD-10-CM

## 2020-02-26 DIAGNOSIS — Z17.0 MALIGNANT NEOPLASM OF BREAST IN FEMALE, ESTROGEN RECEPTOR POSITIVE, UNSPECIFIED LATERALITY, UNSPECIFIED SITE OF BREAST (HCC): ICD-10-CM

## 2020-02-26 DIAGNOSIS — F41.8 DEPRESSION WITH ANXIETY: ICD-10-CM

## 2020-02-26 DIAGNOSIS — E53.8 VITAMIN B 12 DEFICIENCY: ICD-10-CM

## 2020-02-26 DIAGNOSIS — C50.919 MALIGNANT NEOPLASM OF BREAST IN FEMALE, ESTROGEN RECEPTOR POSITIVE, UNSPECIFIED LATERALITY, UNSPECIFIED SITE OF BREAST (HCC): ICD-10-CM

## 2020-02-26 DIAGNOSIS — E78.5 HYPERLIPIDEMIA LDL GOAL <100: Primary | ICD-10-CM

## 2020-02-26 DIAGNOSIS — M16.0 PRIMARY OSTEOARTHRITIS OF BOTH HIPS: ICD-10-CM

## 2020-02-26 PROCEDURE — 99214 OFFICE O/P EST MOD 30 MIN: CPT | Performed by: NURSE PRACTITIONER

## 2020-02-26 PROCEDURE — 74183 MRI ABD W/O CNTR FLWD CNTR: CPT

## 2020-02-26 PROCEDURE — A9581 GADOXETATE DISODIUM INJ: HCPCS | Performed by: INTERNAL MEDICINE

## 2020-02-26 PROCEDURE — 25010000002 GADOXETATE 0.25 MOL/L SOLUTION: Performed by: INTERNAL MEDICINE

## 2020-02-26 RX ORDER — DESVENLAFAXINE 100 MG/1
100 TABLET, EXTENDED RELEASE ORAL DAILY
Qty: 90 TABLET | Refills: 3 | Status: SHIPPED | OUTPATIENT
Start: 2020-02-26 | End: 2021-02-04

## 2020-02-26 RX ADMIN — GADOXETATE DISODIUM 10 ML: 181.43 INJECTION, SOLUTION INTRAVENOUS at 09:52

## 2020-02-26 NOTE — PROGRESS NOTES
"Chief Complaint   Patient presents with   • Ankle Pain     Left ankle keeps fluid on it   • Med check       Subjective     Renae Schmitz is a 69 y.o. female being seen for a follow up appointment today regarding Hyperlipidemia, vitamin deficiencies,  History of breast cancer, OA hips, and depression. Acute visit for left ankle swelling. She is having left ankle swelling off and on after walking all day. It resolves in the morning. She reports \"it is not swollen today because I am here.\" Denies injury, pain, color change. She was recently started on Mobic for hip pain.      She is currently on Crestor 10mg nighlty for cholesterol. She tolerates it well. Brett myalgias. She does have OA in both hips followed by Dr. Watkins.     She takes both Pristiq 50mg daily and Buspar 10mg daily prnfor Depression. She tolerates these well. She has been more anxious with recent testing completed. No depression symptoms. She has gained weight since starting on Mobic and Tamoxifen.    She has seen her Oncologist since last visit here, and was switched from Arimidex to Tamoxifen daily. She has gotten a PET scan and is getting a MRI abd/ liver scan today. She also saw Dr. Alejo for hip pain, and she was given mobic. She would like her kidneys checked.     History of Present Illness     Allergies   Allergen Reactions   • Tegaderm Ag Mesh [Silver] Other (See Comments)     SKIN BLISTERS  SKIN BLISTERS   • Shingrix [Zoster Vac Recomb Adjuvanted] Other (See Comments)     \"ice water sensation in legs\"   • Sulfa Antibiotics Rash   • Zithromax [Azithromycin] Rash         Current Outpatient Medications:   •  busPIRone (BUSPAR) 10 MG tablet, Take 10 mg by mouth Daily As Needed., Disp: , Rfl:   •  desvenlafaxine (PRISTIQ) 50 MG 24 hr tablet, Take 1 tablet by mouth Daily., Disp: 90 tablet, Rfl: 3  •  ketoconazole (NIZORAL) 2 % shampoo, SHAMPOO SCALP TWICE WEEKLY, Disp: , Rfl: 1  •  Loratadine (CLARITIN) 10 MG capsule, Take 10 mg by mouth Daily., " Disp: 90 each, Rfl: 0  •  meloxicam (MOBIC) 7.5 MG tablet, Take 1 tablet by mouth Daily., Disp: 90 tablet, Rfl: 3  •  mometasone (ELOCON) 0.1 % lotion, APPLY TO SCALP EVERY DAY AS NEEDED FOR ITCHING, Disp: , Rfl: 1  •  rosuvastatin (CRESTOR) 20 MG tablet, TAKE ONE TABLET BY MOUTH DAILY, Disp: 90 tablet, Rfl: 1  •  tamoxifen (NOLVADEX) 20 MG chemo tablet, Take 1 tablet by mouth Daily., Disp: 90 tablet, Rfl: 1  •  vitamin B-12 (CYANOCOBALAMIN) 1000 MCG tablet, Take 1 tablet by mouth Daily., Disp: 90 tablet, Rfl: 3  •  vitamin D (ERGOCALCIFEROL) 1.25 MG (61613 UT) capsule capsule, Take 1 capsule by mouth 1 (One) Time Per Week., Disp: 12 capsule, Rfl: 3    The following portions of the patient's history were reviewed and updated as appropriate: allergies, current medications, past family history, past medical history, past social history, past surgical history and problem list.    Review of Systems   Constitutional: Positive for fatigue.   HENT: Negative.    Eyes: Negative.    Respiratory: Negative.    Cardiovascular: Positive for leg swelling (left inner ankle).   Gastrointestinal: Negative.    Endocrine: Negative.    Genitourinary: Negative.    Musculoskeletal: Positive for arthralgias.   Skin: Negative.    Allergic/Immunologic: Negative.    Hematological: Negative.  Negative for adenopathy. Does not bruise/bleed easily.   Psychiatric/Behavioral: Negative.        Assessment     Physical Exam   Constitutional: She is oriented to person, place, and time. She appears well-developed and well-nourished. No distress.   HENT:   Head: Normocephalic.   Right Ear: External ear normal.   Left Ear: External ear normal.   Nose: Nose normal.   Mouth/Throat: Oropharynx is clear and moist. No oropharyngeal exudate.   Neck: Neck supple. No thyromegaly present.   Cardiovascular: Normal rate, regular rhythm, normal heart sounds and intact distal pulses.   No murmur heard.  Pulmonary/Chest: Effort normal and breath sounds normal. No  stridor. No respiratory distress. She has no wheezes.   Abdominal: Soft. Bowel sounds are normal. She exhibits no distension. There is no tenderness.   Musculoskeletal: She exhibits no edema.   PPP bilaterally. No edema, full ROM in ankles   Neurological: She is alert and oriented to person, place, and time. No cranial nerve deficit.   Skin: Skin is warm and dry. She is not diaphoretic. No erythema.   Psychiatric: She has a normal mood and affect. Her behavior is normal.   Vitals reviewed.      Plan     Her fasting labs will be completed today    Renae was seen today for ankle pain and med check.    Diagnoses and all orders for this visit:    Hyperlipidemia LDL goal <100  -     Comprehensive metabolic panel  -     Conv Lipid Panel w/ Chol/HDL Ratio    Vitamin D deficiency  -     Vitamin D 1,25 Dihydroxy    Vitamin B 12 deficiency  -     CBC & Differential  -     Vitamin B12    Depression with anxiety  -     desvenlafaxine (PRISTIQ) 100 MG 24 hr tablet; Take 1 tablet by mouth Daily.    Primary osteoarthritis of both hips    Malignant neoplasm of breast in female, estrogen receptor positive, unspecified laterality, unspecified site of breast (CMS/[facility])        Discussed support hose for fluid retention from Mobic causing intermittent left ankle swelling (none on exam today). Will check Cr today.    Increase Pristiq to 100 mg due ot recent anxiety, reviewed her weight after starting this, and it was stable the first month.    Follow up in July with [facility]

## 2020-02-27 ENCOUNTER — TELEPHONE (OUTPATIENT)
Dept: ONCOLOGY | Facility: CLINIC | Age: 70
End: 2020-02-27

## 2020-02-27 LAB
1,25(OH)2D3 SERPL-MCNC: 89.3 PG/ML (ref 19.9–79.3)
ALBUMIN SERPL-MCNC: 4.1 G/DL (ref 3.5–5.2)
ALBUMIN/GLOB SERPL: 1.6 G/DL
ALP SERPL-CCNC: 64 U/L (ref 39–117)
ALT SERPL-CCNC: 12 U/L (ref 1–33)
AST SERPL-CCNC: 16 U/L (ref 1–32)
BASOPHILS # BLD AUTO: 0.03 10*3/MM3 (ref 0–0.2)
BASOPHILS NFR BLD AUTO: 0.5 % (ref 0–1.5)
BILIRUB SERPL-MCNC: 0.4 MG/DL (ref 0.2–1.2)
BUN SERPL-MCNC: 16 MG/DL (ref 8–23)
BUN/CREAT SERPL: 24.6 (ref 7–25)
CALCIUM SERPL-MCNC: 9.1 MG/DL (ref 8.6–10.5)
CHLORIDE SERPL-SCNC: 105 MMOL/L (ref 98–107)
CHOLEST SERPL-MCNC: 158 MG/DL (ref 0–200)
CHOLEST/HDLC SERPL: 2.11 {RATIO}
CO2 SERPL-SCNC: 24 MMOL/L (ref 22–29)
CREAT SERPL-MCNC: 0.65 MG/DL (ref 0.57–1)
EOSINOPHIL # BLD AUTO: 0.15 10*3/MM3 (ref 0–0.4)
EOSINOPHIL NFR BLD AUTO: 2.7 % (ref 0.3–6.2)
ERYTHROCYTE [DISTWIDTH] IN BLOOD BY AUTOMATED COUNT: 12.5 % (ref 12.3–15.4)
GLOBULIN SER CALC-MCNC: 2.5 GM/DL
GLUCOSE SERPL-MCNC: 92 MG/DL (ref 65–99)
HCT VFR BLD AUTO: 36.7 % (ref 34–46.6)
HDLC SERPL-MCNC: 75 MG/DL (ref 40–60)
HGB BLD-MCNC: 12.4 G/DL (ref 12–15.9)
IMM GRANULOCYTES # BLD AUTO: 0.02 10*3/MM3 (ref 0–0.05)
IMM GRANULOCYTES NFR BLD AUTO: 0.4 % (ref 0–0.5)
LDLC SERPL CALC-MCNC: 65 MG/DL (ref 0–100)
LYMPHOCYTES # BLD AUTO: 2.13 10*3/MM3 (ref 0.7–3.1)
LYMPHOCYTES NFR BLD AUTO: 37.7 % (ref 19.6–45.3)
MCH RBC QN AUTO: 30.2 PG (ref 26.6–33)
MCHC RBC AUTO-ENTMCNC: 33.8 G/DL (ref 31.5–35.7)
MCV RBC AUTO: 89.3 FL (ref 79–97)
MONOCYTES # BLD AUTO: 0.44 10*3/MM3 (ref 0.1–0.9)
MONOCYTES NFR BLD AUTO: 7.8 % (ref 5–12)
NEUTROPHILS # BLD AUTO: 2.88 10*3/MM3 (ref 1.7–7)
NEUTROPHILS NFR BLD AUTO: 50.9 % (ref 42.7–76)
NRBC BLD AUTO-RTO: 0 /100 WBC (ref 0–0.2)
PLATELET # BLD AUTO: 230 10*3/MM3 (ref 140–450)
POTASSIUM SERPL-SCNC: 4.6 MMOL/L (ref 3.5–5.2)
PROT SERPL-MCNC: 6.6 G/DL (ref 6–8.5)
RBC # BLD AUTO: 4.11 10*6/MM3 (ref 3.77–5.28)
SODIUM SERPL-SCNC: 142 MMOL/L (ref 136–145)
TRIGL SERPL-MCNC: 91 MG/DL (ref 0–150)
VIT B12 SERPL-MCNC: 1458 PG/ML (ref 211–946)
VLDLC SERPL CALC-MCNC: 18.2 MG/DL
WBC # BLD AUTO: 5.65 10*3/MM3 (ref 3.4–10.8)

## 2020-02-27 RX ORDER — TAMOXIFEN CITRATE 20 MG/1
20 TABLET ORAL DAILY
Qty: 90 TABLET | Refills: 1 | Status: SHIPPED | OUTPATIENT
Start: 2020-02-27 | End: 2020-02-27 | Stop reason: SDUPTHER

## 2020-02-27 RX ORDER — TAMOXIFEN CITRATE 20 MG/1
20 TABLET ORAL DAILY
Qty: 10 TABLET | Refills: 0 | Status: SHIPPED | OUTPATIENT
Start: 2020-02-27 | End: 2020-08-24

## 2020-02-27 NOTE — TELEPHONE ENCOUNTER
Patient needs #90 supply sent to postal [facility] and also needs some called into local  [facility] [phone]

## 2020-02-27 NOTE — TELEPHONE ENCOUNTER
RX for tamoxifen resent to pt pharmacy [facility]) and script sent to pt pharmacy in [place] for 10 tablets for pt to have until her RX is filled and sent by postal [facility]

## 2020-03-03 ENCOUNTER — TRANSCRIBE ORDERS (OUTPATIENT)
Dept: ADMINISTRATIVE | Facility: HOSPITAL | Age: 70
End: 2020-03-03

## 2020-03-03 DIAGNOSIS — R91.8 LUNG NODULES: Primary | ICD-10-CM

## 2020-03-11 RX ORDER — ROSUVASTATIN CALCIUM 20 MG/1
20 TABLET, COATED ORAL DAILY
Qty: 90 TABLET | Refills: 1 | Status: SHIPPED | OUTPATIENT
Start: 2020-03-11 | End: 2020-08-24

## 2020-03-11 NOTE — TELEPHONE ENCOUNTER
Please send script for rosuvastatin (CRESTOR) 20 MG tablet   [facility], 90 day quantity.

## 2020-03-19 ENCOUNTER — APPOINTMENT (OUTPATIENT)
Dept: LAB | Facility: HOSPITAL | Age: 70
End: 2020-03-19

## 2020-03-25 ENCOUNTER — TELEPHONE (OUTPATIENT)
Dept: ONCOLOGY | Facility: CLINIC | Age: 70
End: 2020-03-25

## 2020-03-25 NOTE — TELEPHONE ENCOUNTER
PT-ALISSA OMALLEY-DR. MARLENE LEON    PT HAD CTSCAN ON 2/26/20 AND WANTS TO REVIEW THE RESULTS.    PT'S PHONE #:  [phone]

## 2020-03-25 NOTE — TELEPHONE ENCOUNTER
Pt. Calling for the results of the mri that she had done of the abd which showed her liver.  Informed pt. The impression showed no suspicious liver lesion is demonstrated.  No evidence of metastatic malignancy within the upper abd.  Pt. Thought she wouldn't need a screening mammogram since she had a pet scan last October 2019.   Informed pt. Per order of dr. Correa she needs to have a screening 3D BILAT. MAMMOGRAM yearly.  Pt. States it is already sched. But she thought she wouldn't need it.   Pt. Instructed to call facility to make sure it is a 3D.  She will call back for any further questions or concerns.

## 2020-05-19 ENCOUNTER — APPOINTMENT (OUTPATIENT)
Dept: WOMENS IMAGING | Facility: HOSPITAL | Age: 70
End: 2020-05-19

## 2020-05-19 PROCEDURE — 77067 SCR MAMMO BI INCL CAD: CPT | Performed by: RADIOLOGY

## 2020-05-19 PROCEDURE — 77063 BREAST TOMOSYNTHESIS BI: CPT | Performed by: RADIOLOGY

## 2020-05-28 NOTE — PROGRESS NOTES
Subjective   REASON FOR FOLLOWUP:    1.Right breast cancer T1c N0 ER/ME positive HER-2 negative  2.  Oncotype score of 20  3.  Radiation and Arimidex planned bone density shows osteopenia-Arimidex started in 4/17                               REQUESTING PHYSICIAN:  Trino Troncoso M.D.        History of Present Illness patient is a 58 a lady with a  right breast cancer T1 CN 0 ER/ME positive HER-2 negative here for follow-up .  At her last visit because of worsening bone density was switch her to tamoxifen and switched off from Prozac to Pristiq and she appears to be tolerating this better with less joint pains.    She was seen by orthopedics because of pain in her hip and when he imaged her hips he found what looked like a lipoma in the left hip her pain was actually in the right hip and they were concerned because of her history of breast cancer that there might be something malignant so we scheduled a PET scan and she is here today for follow-up    Thankfully the lesion in the left femur looks like a lipoma and has no uptake on PET scan but incidentally she was found to have uptake in mediastinal nodes and some calcified and noncalcified lesions in her lung that appeared to be granulomatous disease.  She has never complained to me but her daughter states that she coughs all the time and therefore I think a pulmonary evaluation is required and we will send testing for histoplasmosis but it could also be Mycobacterium avium intracellulare.  There is mention of discrete uptake in the inferior right hepatic lobe with no corresponding lesion and I think we will wait for a while and repeat an MRI of the liver but some    She will continue on tamoxifen for the time being    Surgeon does not want to see her unless she has pain in the left hip at which time replacement would be indicated because of risk of fracture from the lipoma narrowing the cortex      Past Medical History:   Diagnosis Date   • Anemia     prior to  hysterectomy several years ago   • Anxiety    • Benign thyroid cyst 1971   • Breast CA (CMS/HCC) 12/01/2016    Right lumpectomy   • Depression    • Fibromyalgia    • Fracture of wrist    • H/O mammogram 12/04/2018   • Heart murmur    • History of ankle fracture     Right   • History of Papanicolaou smear of cervix 2016    Dr. Johnson   • Hypercholesterolemia    • Hyperlipidemia    • Mitral valve regurgitation    • Mood swings    • MARSHALL on CPAP 1/22/2019   • PONV (postoperative nausea and vomiting)    • Sleep apnea         Past Surgical History:   Procedure Laterality Date   • BREAST BIOPSY     • BREAST LUMPECTOMY WITH SENTINEL NODE BIOPSY AND AXILLARY NODE DISSECTION Right 12/13/2016    Procedure: RT BREAST LUMPECTOMY WITH SENTINEL NODE BIOPSY & MAMMO NEEDLE LOC;  Surgeon: Matt Troncoso MD;  [place] [facility] [place] [facility] [place]:    • COLONOSCOPY N/A 10/10/2018    Procedure: COLONOSCOPY TO CECUM AND TERMINAL ILEUM WITH COLD BIOPSIES;  Surgeon: Carl Salas MD;  Location: [facility] ENDOSCOPY;  Service: Gastroenterology   • HAND SURGERY Left 2003    2 FINGERS PINNED   • HYSTERECTOMY  1995    Complete Sept 1995   • PERCUTANEOUS PINNING FINGER FRACTURE     • THYROID CYST EXCISION  1971   • TUBAL ABDOMINAL LIGATION        ONCOLOGIC HISTORY:   patient is a 66-year-old lady with a long history of depression hypercholesterolemia who was noted on routine mammogram on 11/9/16 to have an abnormality in the right breast at the 12 o'clock position.  This is not seen on a previous mammogram a year ago and this led to a diagnostic mammogram and ultrasound which confirmed a mass 12 o'clock position the right breast measuring about 1 cm by ultrasound with normal appearing right axillary lymph nodes.  A biopsy was done on 11/21/13 which revealed a grade 2 infiltrating ductal carcinoma ER/WV strongly positive HER-2 negative and the patient was referred to Dr. Troncoso.  Dr. Troncoso scheduled MRI of both breasts on 12/8/16 and this revealed a  1.8 x 1.3 cm mass in the right breast with no other abnormalities in the axilla or in the left breast and the patient was taken to surgery on 16 which time she had a needle localization lumpectomy and sentinel node biopsy.  Final pathology showed a 1.5 x 1.3 cm grade 2 infiltrating ductal carcinoma  With associated DCIS and margins that were clear after reexcision and one negative sentinel node.  Patient is on well since surgery and is here to discuss adjuvant chemotherapy options.  She was  3 para 3 first childbirth was at night age 17 she did not breast-feed her children.  Menarche  at age 11 and menopause at age 45 when she had a hysterectomy and oophorectomy.  She took hormonal therapy for 10 years and stopped in  when her sister had breast cancer she was on birth control pills for at least 20 years prior to her hysterectomy.  She has a sister who developed breast cancer at age 58 and her second cancer  although one of these was DCIS and the other invasive cancer.  She took tamoxifen for 5 years  Is no other breast or ovarian cancer in the family but she has a very limited family as her father was an only child and her mother had 1 brother and she has one sister  Oncotype score returned at 20 and after looking at the risks and benefits of chemotherapy in this subset which I explained was not well understood at this point pending results of the [employer] trial we have opted to give her Arimidex and start radiation.  .    she went for genetic testing but she did not meet the criteria for testing and no further workup was done.  We talked again about long-term effects of Arimidex.  Her mood appears to be better with the higher dose of Prozac and BuSpar when necessary.  Her mother-in-law who is very difficult in general is staying with them the last 4 years and this is causing more stress for her but she realizes there is no other option          Current Outpatient Medications on File Prior to  "Visit   Medication Sig Dispense Refill   • busPIRone (BUSPAR) 10 MG tablet Take 10 mg by mouth Daily As Needed.     • desvenlafaxine (PRISTIQ) 100 MG 24 hr tablet Take 1 tablet by mouth Daily. 90 tablet 3   • ketoconazole (NIZORAL) 2 % shampoo SHAMPOO SCALP TWICE WEEKLY  1   • Loratadine (CLARITIN) 10 MG capsule Take 10 mg by mouth Daily. 90 each 0   • meloxicam (MOBIC) 7.5 MG tablet Take 1 tablet by mouth Daily. 90 tablet 3   • mometasone (ELOCON) 0.1 % lotion APPLY TO SCALP EVERY DAY AS NEEDED FOR ITCHING  1   • rosuvastatin (CRESTOR) 20 MG tablet Take 1 tablet by mouth Daily. 90 tablet 1   • tamoxifen (NOLVADEX) 20 MG chemo tablet Take 1 tablet by mouth Daily. 10 tablet 0   • vitamin B-12 (CYANOCOBALAMIN) 1000 MCG tablet Take 1 tablet by mouth Daily. 90 tablet 3   • vitamin D (ERGOCALCIFEROL) 1.25 MG (30216 UT) capsule capsule Take 1 capsule by mouth 1 (One) Time Per Week. 12 capsule 3     No current facility-administered medications on file prior to visit.         ALLERGIES:    Allergies   Allergen Reactions   • Tegaderm Ag Mesh [Silver] Other (See Comments)     SKIN BLISTERS  SKIN BLISTERS   • Shingrix [Zoster Vac Recomb Adjuvanted] Other (See Comments)     \"ice water sensation in legs\"   • Sulfa Antibiotics Rash   • Zithromax [Azithromycin] Rash        Social History     Socioeconomic History   • Marital status:      Spouse name: Peter   • Number of children: Not on file   • Years of education: High school   • Highest education level: Not on file   Occupational History     Employer: RETIRED   Tobacco Use   • Smoking status: Never Smoker   • Smokeless tobacco: Never Used   Substance and Sexual Activity   • Alcohol use: No   • Drug use: No   • Sexual activity: Yes     Partners: Male     Birth control/protection: Surgical   Social History Narrative     is Peter.    Patient retired from a bank and factory work. [place], has [place] acres.        2 Natural daughters; 1 step-daughter and    1 " Son    She watches her grand babies.         She has a flower garden.         Family History   Problem Relation Age of Onset   • Heart failure Mother    • Colon cancer Father    • Liver cancer Father    • Tuberculosis Father    • Breast cancer Sister 58        DCIS   • Depression Daughter       Father  at 65 of metastatic colon cancer his parents lived to their 80s without cancer mother  at 93 of CHF and had only one brother who had no cancer.  She has only one sister who had breast cancer at age 58 and again at 63 no ovarian cancer in the family  Review of Systems   Constitutional: Positive for fatigue (19). Negative for activity change, appetite change, chills, fever and unexpected weight change.   Eyes: Positive for visual disturbance (cataract).   Respiratory: Negative for cough, chest tightness and shortness of breath.    Cardiovascular: Negative for chest pain and leg swelling.   Gastrointestinal: Negative for constipation, diarrhea, nausea and vomiting.   Genitourinary: Negative for difficulty urinating.   Musculoskeletal: Positive for arthralgias, back pain (after a period of standing 19) and gait problem (hips 19). Negative for joint swelling and neck pain.        TMJ   Neurological: Negative for dizziness and headaches.   Psychiatric/Behavioral: Positive for dysphoric mood (19 starting new meds tonight). The patient is nervous/anxious (worse 19).         Objective     There were no vitals filed for this visit.  Current Status 2019   ECOG score 0       Physical Exam    GENERAL:  Well-developed, well-nourished in no acute distress.   SKIN:  Warm, dry without rashes, purpura or petechiae.  EYES:  Pupils equal, round and reactive to light.  EOMs intact.  Conjunctivae normal.  EARS:  Hearing intact.  NOSE:  Septum midline.  No excoriations or nasal discharge.  MOUTH:  Tongue is well-papillated; no stomatitis or ulcers.  Lips normal.  THROAT:  Oropharynx without lesions or  exudates.  NECK:  Supple with good range of motion; no thyromegaly or masses, no JVD.  LYMPHATICS:  No cervical, supraclavicular, axillary or inguinal adenopathy.  CHEST:  Lungs clear to auscultation. Good airflow.  BREASTS: Both breasts are benign lumpectomy scar in the right breast is well-healed but   CARDIAC:  Regular rate and rhythm without murmurs, rubs or gallops. Normal S1,S2.  ABDOMEN:  Soft, nontender with no hepatosplenomegaly or masses.  EXTREMITIES:  No clubbing, cyanosis or edema.  NEUROLOGICAL:  Cranial Nerves II-XII grossly intact.  No focal neurological deficits.  PSYCHIATRIC:  Normal affect and mood.        RECENT LABS:  Hematology WBC   Date Value Ref Range Status   02/26/2020 5.65 3.40 - 10.80 10*3/mm3 Final     RBC   Date Value Ref Range Status   02/26/2020 4.11 3.77 - 5.28 10*6/mm3 Final     Hemoglobin   Date Value Ref Range Status   02/26/2020 12.4 12.0 - 15.9 g/dL Final   11/05/2019 12.5 12.0 - 15.9 g/dL Final     Hematocrit   Date Value Ref Range Status   02/26/2020 36.7 34.0 - 46.6 % Final   11/05/2019 38.8 34.0 - 46.6 % Final     Platelets   Date Value Ref Range Status   02/26/2020 230 140 - 450 10*3/mm3 Final   11/05/2019 220 140 - 450 10*3/mm3 Final      Dexa Scan      Study Result     F-18 FDG PET FROM SKULL BASE TO MID THIGH WITH PET/CT FUSION   IMPRESSION:  1.  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  2.  Focal moderate FDG uptake within the inferior right hepatic lobe  without a corresponding lesion seen on noncontrast CT. While findings  may be artifactual and represent displaced activity from the underlying  bowel, findings remain nonspecific and further evaluation with abdominal  MRI with and without contrast with Eovist is recommended to exclude  metastatic disease in this location.  3.  Moderate to intense FDG avid right hilar and mediastinal lymph nodes  which  demonstrates discrete calcification and are likely granulomatous.  Focus of moderate to intense FDG uptake within the left hilum is present  without definite calcification seen on corresponding noncontrast CT.  Given the findings within the remainder of the mediastinum and hilum,  findings are favored to be granulomatous. However, findings remain  nonspecific. Correlation with prior imaging is recommended if available  to establish long-term stability. If insufficient prior imaging is  available, recommend follow-up chest CT with contrast in 2 months to  ensure stability.  4.  Focus of intense FDG uptake within the right anterior gluteal  musculature and abutting the right iliac crest anteriorly. No  corresponding lesion is seen on noncontrast CT. While findings may be  reactive, they are nonspecific on PET and correlation with recent pelvic  MRI is recommended to determine if this area was included within the  field-of-view and if an underlying lesion is visualized. If this area  was not adequately evaluated on recent pelvic MRI, I recommend follow-up  pelvic MRI with and without contrast to exclude an underlying lesion in  this location.            Assessment/Plan   1.  T1 CN 0M0 ER/ME positive HER-2 negative right-sided breast cancer  ·  oncotype score of 20 -Arimidex plus radiation planned started in 3/17  · Worsening bone density in 3/19 switch to tamoxifen in 8/19      2.  Mild mitral regurgitation  3 long history of depression on Prozac-Dose increased recently with improvement-change to Pristiq in 4/19  4.  Positive family history of breast cancer in her sister and colon cancer in her father the very small immediate family-saw the genetic counselors but they do not feel testing was appropriate  5.  Worsening bone density in 3/19    Plan  1.  discontinue Arimidex 1  2.  Tamoxifen 20 mg daily   3.return in 6 months   I encouraged her to call if she has any undue side effects from the tamoxifen and stressed the  toxicity profile again

## 2020-05-29 ENCOUNTER — OFFICE VISIT (OUTPATIENT)
Dept: ONCOLOGY | Facility: CLINIC | Age: 70
End: 2020-05-29

## 2020-05-29 ENCOUNTER — APPOINTMENT (OUTPATIENT)
Dept: LAB | Facility: HOSPITAL | Age: 70
End: 2020-05-29

## 2020-05-29 DIAGNOSIS — C50.919 MALIGNANT NEOPLASM OF BREAST IN FEMALE, ESTROGEN RECEPTOR POSITIVE, UNSPECIFIED LATERALITY, UNSPECIFIED SITE OF BREAST (HCC): Primary | ICD-10-CM

## 2020-05-29 DIAGNOSIS — Z17.0 MALIGNANT NEOPLASM OF BREAST IN FEMALE, ESTROGEN RECEPTOR POSITIVE, UNSPECIFIED LATERALITY, UNSPECIFIED SITE OF BREAST (HCC): Primary | ICD-10-CM

## 2020-05-29 PROCEDURE — 99442 PR PHYS/QHP TELEPHONE EVALUATION 11-20 MIN: CPT | Performed by: INTERNAL MEDICINE

## 2020-05-29 NOTE — PROGRESS NOTES
Subjective   REASON FOR FOLLOWUP:    1.Right breast cancer T1c N0 ER/HI positive HER-2 negative  2.  Oncotype score of 20  3.  Radiation and Arimidex planned bone density shows osteopenia-Arimidex started in 4/17                               REQUESTING PHYSICIAN:  Trino Troncoso M.D.        History of Present Illness patient is a 58 a lady with a  right breast cancer T1 CN 0 ER/HI positive HER-2 negative here for follow-up .  At her last visit because of worsening bone density was switch her to tamoxifen and switched off from Prozac to Pristiq and she appears to be tolerating this better with less joint pains.  She is doing a telephone visit today because of the coronavirus pandemic.    We reviewed the results of her MRI of the liver and thankfully there is no suspicious lesions and just a small cyst.    Follow-up chest CT ordered by pulmonology was also reviewed and shows no change and no signs of active granulomatous disease    Mammogram from May was reviewed and thankfully benign    She is tolerating tamoxifen without problems and has had a hysterectomy so Pap smears are not indicated    Surgeon does not want to see her unless she has pain in the left hip at which time replacement would be indicated because of risk of fracture from the lipoma narrowing the cortex      Past Medical History:   Diagnosis Date   • Anemia     prior to hysterectomy several years ago   • Anxiety    • Benign thyroid cyst 1971   • Breast CA (CMS/HCC) 12/01/2016    Right lumpectomy   • Depression    • Fibromyalgia    • Fracture of wrist    • H/O mammogram 12/04/2018   • Heart murmur    • History of ankle fracture     Right   • History of Papanicolaou smear of cervix 2016    Dr. Johnson   • Hypercholesterolemia    • Hyperlipidemia    • Mitral valve regurgitation    • Mood swings    • MARSHALL on CPAP 1/22/2019   • PONV (postoperative nausea and vomiting)    • Sleep apnea         Past Surgical History:   Procedure Laterality Date   • BREAST BIOPSY      • BREAST LUMPECTOMY WITH SENTINEL NODE BIOPSY AND AXILLARY NODE DISSECTION Right 2016    Procedure: RT BREAST LUMPECTOMY WITH SENTINEL NODE BIOPSY & MAMMO NEEDLE LOC;  Surgeon: Matt Troncoso MD;  [place]  [facility] [place] [facility] [place]:    • COLONOSCOPY N/A 10/10/2018    Procedure: COLONOSCOPY TO CECUM AND TERMINAL ILEUM WITH COLD BIOPSIES;  Surgeon: Carl Salas MD;  Location: [facility] ENDOSCOPY;  Service: Gastroenterology   • HAND SURGERY Left     2 FINGERS PINNED   • HYSTERECTOMY      Complete 1995   • PERCUTANEOUS PINNING FINGER FRACTURE     • THYROID CYST EXCISION     • TUBAL ABDOMINAL LIGATION        ONCOLOGIC HISTORY:   patient is a 66-year-old lady with a long history of depression hypercholesterolemia who was noted on routine mammogram on 16 to have an abnormality in the right breast at the 12 o'clock position.  This is not seen on a previous mammogram a year ago and this led to a diagnostic mammogram and ultrasound which confirmed a mass 12 o'clock position the right breast measuring about 1 cm by ultrasound with normal appearing right axillary lymph nodes.  A biopsy was done on 13 which revealed a grade 2 infiltrating ductal carcinoma ER/[place] strongly positive HER-2 negative and the patient was referred to Dr. Troncoso.  Dr. Troncoso scheduled MRI of both breasts on 16 and this revealed a 1.8 x 1.3 cm mass in the right breast with no other abnormalities in the axilla or in the left breast and the patient was taken to surgery on 16 which time she had a needle localization lumpectomy and sentinel node biopsy.  Final pathology showed a 1.5 x 1.3 cm grade 2 infiltrating ductal carcinoma  With associated DCIS and margins that were clear after reexcision and one negative sentinel node.  Patient is on well since surgery and is here to discuss adjuvant chemotherapy options.  She was  3 para 3 first childbirth was at night age 17 she did not breast-feed her children.   Menarche  at age 11 and menopause at age 45 when she had a hysterectomy and oophorectomy.  She took hormonal therapy for 10 years and stopped in 2005 when her sister had breast cancer she was on birth control pills for at least 20 years prior to her hysterectomy.  She has a sister who developed breast cancer at age 58 and her second cancer 2010 although one of these was DCIS and the other invasive cancer.  She took tamoxifen for 5 years  Is no other breast or ovarian cancer in the family but she has a very limited family as her father was an only child and her mother had 1 brother and she has one sister  Oncotype score returned at 20 and after looking at the risks and benefits of chemotherapy in this subset which I explained was not well understood at this point pending results of the [employer] trial we have opted to give her Arimidex and start radiation.  .    she went for genetic testing but she did not meet the criteria for testing and no further workup was done.  We talked again about long-term effects of Arimidex.  Her mood appears to be better with the higher dose of Prozac and BuSpar when necessary.  Her mother-in-law who is very difficult in general is staying with them the last 4 years and this is causing more stress for her but she realizes there is no other option    11/19  She was seen by orthopedics because of pain in her hip and when he imaged her hips he found what looked like a lipoma in the left hip her pain was actually in the right hip and they were concerned because of her history of breast cancer that there might be something malignant so we scheduled a PET scan and she is here today for follow-up    Thankfully the lesion in the left femur looks like a lipoma and has no uptake on PET scan but incidentally she was found to have uptake in mediastinal nodes and some calcified and noncalcified lesions in her lung that appeared to be granulomatous disease.  She has never complained to me but her  "daughter states that she coughs all the time and therefore I think a pulmonary evaluation is required and we will send testing for histoplasmosis but it could also be Mycobacterium avium intracellulare.  There is mention of discrete uptake in the inferior right hepatic lobe with no corresponding lesion and I think we will wait for a while and repeat an MRI of the liver but some          Current Outpatient Medications on File Prior to Visit   Medication Sig Dispense Refill   • busPIRone (BUSPAR) 10 MG tablet Take 10 mg by mouth Daily As Needed.     • desvenlafaxine (PRISTIQ) 100 MG 24 hr tablet Take 1 tablet by mouth Daily. 90 tablet 3   • ketoconazole (NIZORAL) 2 % shampoo SHAMPOO SCALP TWICE WEEKLY  1   • Loratadine (CLARITIN) 10 MG capsule Take 10 mg by mouth Daily. 90 each 0   • meloxicam (MOBIC) 7.5 MG tablet Take 1 tablet by mouth Daily. 90 tablet 3   • mometasone (ELOCON) 0.1 % lotion APPLY TO SCALP EVERY DAY AS NEEDED FOR ITCHING  1   • rosuvastatin (CRESTOR) 20 MG tablet Take 1 tablet by mouth Daily. 90 tablet 1   • tamoxifen (NOLVADEX) 20 MG chemo tablet Take 1 tablet by mouth Daily. 10 tablet 0   • vitamin B-12 (CYANOCOBALAMIN) 1000 MCG tablet Take 1 tablet by mouth Daily. 90 tablet 3   • vitamin D (ERGOCALCIFEROL) 1.25 MG (54817 UT) capsule capsule Take 1 capsule by mouth 1 (One) Time Per Week. 12 capsule 3     No current facility-administered medications on file prior to visit.         ALLERGIES:    Allergies   Allergen Reactions   • Tegaderm Ag Mesh [Silver] Other (See Comments)     SKIN BLISTERS  SKIN BLISTERS   • Shingrix [Zoster Vac Recomb Adjuvanted] Other (See Comments)     \"ice water sensation in legs\"   • Sulfa Antibiotics Rash   • Zithromax [Azithromycin] Rash        Social History     Socioeconomic History   • Marital status:      Spouse name: Peter   • Number of children: Not on file   • Years of education: High school   • Highest education level: Not on file   Occupational History     " Employer: RETIRED   Tobacco Use   • Smoking status: Never Smoker   • Smokeless tobacco: Never Used   Substance and Sexual Activity   • Alcohol use: No   • Drug use: No   • Sexual activity: Yes     Partners: Male     Birth control/protection: Surgical   Social History Narrative     is Peter.    Patient retired from a bank and factory work. [place], has [place] acres.        2 Natural daughters; 1 step-daughter and    1 Son    She watches her grand babies.         She has a flower garden.         Family History   Problem Relation Age of Onset   • Heart failure Mother    • Colon cancer Father    • Liver cancer Father    • Tuberculosis Father    • Breast cancer Sister 58        DCIS   • Depression Daughter       Father  at 65 of metastatic colon cancer his parents lived to their 80s without cancer mother  at 93 of CHF and had only one brother who had no cancer.  She has only one sister who had breast cancer at age 58 and again at 63 no ovarian cancer in the family  Review of Systems   Constitutional: Positive for fatigue (19). Negative for activity change, appetite change, chills, fever and unexpected weight change.   Eyes: Positive for visual disturbance (cataract).   Respiratory: Negative for cough, chest tightness and shortness of breath.    Cardiovascular: Negative for chest pain and leg swelling.   Gastrointestinal: Negative for constipation, diarrhea, nausea and vomiting.   Genitourinary: Negative for difficulty urinating.   Musculoskeletal: Positive for arthralgias, back pain (after a period of standing 19) and gait problem (hips 19). Negative for joint swelling and neck pain.        TMJ   Neurological: Negative for dizziness and headaches.   Psychiatric/Behavioral: Positive for dysphoric mood (19 starting new meds tonight). The patient is nervous/anxious (worse 19).         Objective     There were no vitals filed for this visit.  Telephone visit no vitals  Current  Status 11/5/2019   ECOG score 0       Physical Exam    GENERAL:  Well-developed, well-nourished in no acute distress.   SKIN:  Warm, dry without rashes, purpura or petechiae.  EYES:  Pupils equal, round and reactive to light.  EOMs intact.  Conjunctivae normal.  EARS:  Hearing intact.  NOSE:  Septum midline.  No excoriations or nasal discharge.  MOUTH:  Tongue is well-papillated; no stomatitis or ulcers.  Lips normal.  THROAT:  Oropharynx without lesions or exudates.  NECK:  Supple with good range of motion; no thyromegaly or masses, no JVD.  LYMPHATICS:  No cervical, supraclavicular, axillary or inguinal adenopathy.  CHEST:  Lungs clear to auscultation. Good airflow.  BREASTS: Both breasts are benign lumpectomy scar in the right breast is well-healed but   CARDIAC:  Regular rate and rhythm without murmurs, rubs or gallops. Normal S1,S2.  ABDOMEN:  Soft, nontender with no hepatosplenomegaly or masses.  EXTREMITIES:  No clubbing, cyanosis or edema.  NEUROLOGICAL:  Cranial Nerves II-XII grossly intact.  No focal neurological deficits.  PSYCHIATRIC:  Normal affect and mood.      Telephone visit no physical    RECENT LABS:  Hematology WBC   Date Value Ref Range Status   02/26/2020 5.65 3.40 - 10.80 10*3/mm3 Final     RBC   Date Value Ref Range Status   02/26/2020 4.11 3.77 - 5.28 10*6/mm3 Final     Hemoglobin   Date Value Ref Range Status   02/26/2020 12.4 12.0 - 15.9 g/dL Final   11/05/2019 12.5 12.0 - 15.9 g/dL Final     Hematocrit   Date Value Ref Range Status   02/26/2020 36.7 34.0 - 46.6 % Final   11/05/2019 38.8 34.0 - 46.6 % Final     Platelets   Date Value Ref Range Status   02/26/2020 230 140 - 450 10*3/mm3 Final   11/05/2019 220 140 - 450 10*3/mm3 Final      Dexa Scan      Study Result     F-18 FDG PET FROM SKULL BASE TO MID THIGH WITH PET/CT FUSION   IMPRESSION:  1.  Findings of an intraosseous lipoma within the left femoral head  which does not demonstrate FDG uptake above that of the  surrounding  marrow.  2.  Focal moderate FDG uptake within the inferior right hepatic lobe  without a corresponding lesion seen on noncontrast CT. While findings  may be artifactual and represent displaced activity from the underlying  bowel, findings remain nonspecific and further evaluation with abdominal  MRI with and without contrast with Eovist is recommended to exclude  metastatic disease in this location.  3.  Moderate to intense FDG avid right hilar and mediastinal lymph nodes  which demonstrates discrete calcification and are likely granulomatous.  Focus of moderate to intense FDG uptake within the left hilum is present  without definite calcification seen on corresponding noncontrast CT.  Given the findings within the remainder of the mediastinum and hilum,  findings are favored to be granulomatous. However, findings remain  nonspecific. Correlation with prior imaging is recommended if available  to establish long-term stability. If insufficient prior imaging is  available, recommend follow-up chest CT with contrast in 2 months to  ensure stability.  4.  Focus of intense FDG uptake within the right anterior gluteal  musculature and abutting the right iliac crest anteriorly. No  corresponding lesion is seen on noncontrast CT. While findings may be  reactive, they are nonspecific on PET and correlation with recent pelvic  MRI is recommended to determine if this area was included within the  field-of-view and if an underlying lesion is visualized. If this area  was not adequately evaluated on recent pelvic MRI, I recommend follow-up  pelvic MRI with and without contrast to exclude an underlying lesion in  this location.      MRI LIVER  IMPRESSION:  1.  No suspicious liver lesion is demonstrated. No evidence of  metastatic malignancy within the upper abdomen.  2.  Tiny benign cyst in the dome of the liver. Small bilateral benign  renal cysts.  3.  Cholelithiasis.   This report was finalized on 2/26/2020     CT  CHEST  IMPRESSION:  No definite adenopathy is visible on this unenhanced chest  CT scan. There is shortening no change in the appearance of the hilar  regions as compared with PET CT scan 10/31/2019.     Lungs are clear except for calcified granulomas     Probably benign 8 mm low-density lesion in the liver. Although is not  visible on the PET scan, it was probably present and simply obscured by  motion on the nonbreath-holding images. Attention on future imaging is  recommended     This report was finalized on 2/24/2020   Assessment/Plan   1.  T1 CN 0M0 ER/AZ positive HER-2 negative right-sided breast cancer  ·  oncotype score of 20 -Arimidex plus radiation planned started in 3/17  · Worsening bone density in 3/19 switch to tamoxifen in 8/19      2.  Mild mitral regurgitation  3 long history of depression on Prozac-Dose increased recently with improvement-change to Pristiq in 4/19    4.  Positive family history of breast cancer in her sister and colon cancer   in her father the very small immediate family-saw the genetic counselors but they do not feel testing was appropriate    5.  Worsening bone density in 3/19 changed from Arimidex to tamoxifen    6.  Abnormal mass left hip felt to be lipoma PET scan dated 10/31 19- abnormal calcified hilar nodes and liver lesion noted on PET scan    7.  MRI of the abdomen dated 3/20 shows no liver lesions    8.  Cough and possible granulomatous lung disease referred to pulmonary    Plan  1.  Continue  Tamoxifen 20 mg daily   2. .return in 6 months   I encouraged her to call if she has any undue side effects from the tamoxifen and stressed the toxicity profile again    You have chosen to receive care through a telephone visit. Do you consent to use a telephone visit for your medical care today? Yes    Telephone visit 20 minutes I reviewed her CAT scans and MRIs and mammogram results and discussed symptoms with her

## 2020-07-14 DIAGNOSIS — E78.5 HYPERLIPIDEMIA LDL GOAL <100: Primary | ICD-10-CM

## 2020-07-14 DIAGNOSIS — E53.8 VITAMIN B 12 DEFICIENCY: ICD-10-CM

## 2020-07-14 DIAGNOSIS — Z00.00 MEDICARE ANNUAL WELLNESS VISIT, SUBSEQUENT: ICD-10-CM

## 2020-07-14 DIAGNOSIS — E55.9 VITAMIN D DEFICIENCY: ICD-10-CM

## 2020-07-14 LAB
25(OH)D3+25(OH)D2 SERPL-MCNC: 55.6 NG/ML (ref 30–100)
ALBUMIN SERPL-MCNC: 4.1 G/DL (ref 3.5–5.2)
ALBUMIN/GLOB SERPL: 1.9 G/DL
ALP SERPL-CCNC: 60 U/L (ref 39–117)
ALT SERPL-CCNC: 13 U/L (ref 1–33)
AST SERPL-CCNC: 14 U/L (ref 1–32)
BASOPHILS # BLD AUTO: 0.03 10*3/MM3 (ref 0–0.2)
BASOPHILS NFR BLD AUTO: 0.5 % (ref 0–1.5)
BILIRUB SERPL-MCNC: 0.3 MG/DL (ref 0–1.2)
BUN SERPL-MCNC: 17 MG/DL (ref 8–23)
BUN/CREAT SERPL: 23 (ref 7–25)
CALCIUM SERPL-MCNC: 8.5 MG/DL (ref 8.6–10.5)
CHLORIDE SERPL-SCNC: 105 MMOL/L (ref 98–107)
CHOLEST SERPL-MCNC: 151 MG/DL (ref 0–200)
CO2 SERPL-SCNC: 24.5 MMOL/L (ref 22–29)
CREAT SERPL-MCNC: 0.74 MG/DL (ref 0.57–1)
EOSINOPHIL # BLD AUTO: 0.18 10*3/MM3 (ref 0–0.4)
EOSINOPHIL NFR BLD AUTO: 3 % (ref 0.3–6.2)
ERYTHROCYTE [DISTWIDTH] IN BLOOD BY AUTOMATED COUNT: 12.9 % (ref 12.3–15.4)
GLOBULIN SER CALC-MCNC: 2.2 GM/DL
GLUCOSE SERPL-MCNC: 95 MG/DL (ref 65–99)
HCT VFR BLD AUTO: 37.5 % (ref 34–46.6)
HDLC SERPL-MCNC: 71 MG/DL (ref 40–60)
HGB BLD-MCNC: 12.3 G/DL (ref 12–15.9)
IMM GRANULOCYTES # BLD AUTO: 0.02 10*3/MM3 (ref 0–0.05)
IMM GRANULOCYTES NFR BLD AUTO: 0.3 % (ref 0–0.5)
LDLC SERPL CALC-MCNC: 65 MG/DL (ref 0–100)
LDLC/HDLC SERPL: 0.91 {RATIO}
LYMPHOCYTES # BLD AUTO: 2.39 10*3/MM3 (ref 0.7–3.1)
LYMPHOCYTES NFR BLD AUTO: 39.7 % (ref 19.6–45.3)
MCH RBC QN AUTO: 30.3 PG (ref 26.6–33)
MCHC RBC AUTO-ENTMCNC: 32.8 G/DL (ref 31.5–35.7)
MCV RBC AUTO: 92.4 FL (ref 79–97)
MONOCYTES # BLD AUTO: 0.5 10*3/MM3 (ref 0.1–0.9)
MONOCYTES NFR BLD AUTO: 8.3 % (ref 5–12)
NEUTROPHILS # BLD AUTO: 2.9 10*3/MM3 (ref 1.7–7)
NEUTROPHILS NFR BLD AUTO: 48.2 % (ref 42.7–76)
NRBC BLD AUTO-RTO: 0 /100 WBC (ref 0–0.2)
PLATELET # BLD AUTO: 244 10*3/MM3 (ref 140–450)
POTASSIUM SERPL-SCNC: 4.3 MMOL/L (ref 3.5–5.2)
PROT SERPL-MCNC: 6.3 G/DL (ref 6–8.5)
RBC # BLD AUTO: 4.06 10*6/MM3 (ref 3.77–5.28)
SODIUM SERPL-SCNC: 140 MMOL/L (ref 136–145)
TRIGL SERPL-MCNC: 77 MG/DL (ref 0–150)
VIT B12 SERPL-MCNC: 1636 PG/ML (ref 211–946)
VLDLC SERPL CALC-MCNC: 15.4 MG/DL
WBC # BLD AUTO: 6.02 10*3/MM3 (ref 3.4–10.8)

## 2020-07-21 ENCOUNTER — OFFICE VISIT (OUTPATIENT)
Dept: INTERNAL MEDICINE | Facility: CLINIC | Age: 70
End: 2020-07-21

## 2020-07-21 VITALS
HEART RATE: 76 BPM | BODY MASS INDEX: 34.53 KG/M2 | WEIGHT: 220 LBS | SYSTOLIC BLOOD PRESSURE: 118 MMHG | HEIGHT: 67 IN | RESPIRATION RATE: 16 BRPM | TEMPERATURE: 98.1 F | OXYGEN SATURATION: 96 % | DIASTOLIC BLOOD PRESSURE: 78 MMHG

## 2020-07-21 DIAGNOSIS — Z00.00 MEDICARE ANNUAL WELLNESS VISIT, SUBSEQUENT: Primary | ICD-10-CM

## 2020-07-21 DIAGNOSIS — E78.5 HYPERLIPIDEMIA LDL GOAL <100: ICD-10-CM

## 2020-07-21 DIAGNOSIS — K21.9 GASTROESOPHAGEAL REFLUX DISEASE WITHOUT ESOPHAGITIS: ICD-10-CM

## 2020-07-21 DIAGNOSIS — E53.8 VITAMIN B 12 DEFICIENCY: ICD-10-CM

## 2020-07-21 DIAGNOSIS — Z11.59 ENCOUNTER FOR HEPATITIS C SCREENING TEST FOR LOW RISK PATIENT: ICD-10-CM

## 2020-07-21 DIAGNOSIS — F41.8 DEPRESSION WITH ANXIETY: ICD-10-CM

## 2020-07-21 DIAGNOSIS — I34.0 NONRHEUMATIC MITRAL VALVE REGURGITATION: ICD-10-CM

## 2020-07-21 DIAGNOSIS — Z91.09 ENVIRONMENTAL ALLERGIES: ICD-10-CM

## 2020-07-21 LAB
BILIRUB BLD-MCNC: ABNORMAL MG/DL
CLARITY, POC: CLEAR
COLOR UR: YELLOW
GLUCOSE UR STRIP-MCNC: NEGATIVE MG/DL
KETONES UR QL: ABNORMAL
LEUKOCYTE EST, POC: NEGATIVE
NITRITE UR-MCNC: NEGATIVE MG/ML
PH UR: 5.5 [PH] (ref 5–8)
PROT UR STRIP-MCNC: NEGATIVE MG/DL
RBC # UR STRIP: NEGATIVE /UL
SP GR UR: 1.02 (ref 1–1.03)
UROBILINOGEN UR QL: NORMAL

## 2020-07-21 PROCEDURE — 81003 URINALYSIS AUTO W/O SCOPE: CPT | Performed by: NURSE PRACTITIONER

## 2020-07-21 PROCEDURE — G0439 PPPS, SUBSEQ VISIT: HCPCS | Performed by: NURSE PRACTITIONER

## 2020-07-21 PROCEDURE — 99214 OFFICE O/P EST MOD 30 MIN: CPT | Performed by: NURSE PRACTITIONER

## 2020-07-21 RX ORDER — OMEPRAZOLE 40 MG/1
40 CAPSULE, DELAYED RELEASE ORAL DAILY
COMMUNITY
End: 2021-03-03 | Stop reason: SDUPTHER

## 2020-07-21 RX ORDER — MONTELUKAST SODIUM 10 MG/1
10 TABLET ORAL DAILY
COMMUNITY
End: 2021-03-03 | Stop reason: SDUPTHER

## 2020-07-21 NOTE — PATIENT INSTRUCTIONS
Advance Directive    Advance directives are legal documents that let you make choices ahead of time about your health care and medical treatment in case you become unable to communicate for yourself. Advance directives are a way for you to communicate your wishes to family, friends, and health care providers. This can help convey your decisions about end-of-life care if you become unable to communicate.  Discussing and writing advance directives should happen over time rather than all at once. Advance directives can be changed depending on your situation and what you want, even after you have signed the advance directives.  If you do not have an advance directive, some states assign family decision makers to act on your behalf based on how closely you are related to them. Each state has its own laws regarding advance directives. You may want to check with your health care provider, , or state representative about the laws in your state. There are different types of advance directives, such as:  · Medical power of .  · Living will.  · Do not resuscitate (DNR) or do not attempt resuscitation (DNAR) order.  Health care proxy and medical power of   A health care proxy, also called a health care agent, is a person who is appointed to make medical decisions for you in cases in which you are unable to make the decisions yourself. Generally, people choose someone they know well and trust to represent their preferences. Make sure to ask this person for an agreement to act as your proxy. A proxy may have to exercise judgment in the event of a medical decision for which your wishes are not known.  A medical power of  is a legal document that names your health care proxy. Depending on the laws in your state, after the document is written, it may also need to be:  · Signed.  · Notarized.  · Dated.  · Copied.  · Witnessed.  · Incorporated into your medical record.  You may also want to appoint  someone to manage your financial affairs in a situation in which you are unable to do so. This is called a durable power of  for finances. It is a separate legal document from the durable power of  for health care. You may choose the same person or someone different from your health care proxy to act as your agent in financial matters.  If you do not appoint a proxy, or if there is a concern that the proxy is not acting in your best interests, a court-appointed guardian may be designated to act on your behalf.  Living will  A living will is a set of instructions documenting your wishes about medical care when you cannot express them yourself. Health care providers should keep a copy of your living will in your medical record. You may want to give a copy to family members or friends. To alert caregivers in case of an emergency, you can place a card in your wallet to let them know that you have a living will and where they can find it. A living will is used if you become:  · Terminally ill.  · Incapacitated.  · Unable to communicate or make decisions.  Items to consider in your living will include:  · The use or non-use of life-sustaining equipment, such as dialysis machines and breathing machines (ventilators).  · A DNR or DNAR order, which is the instruction not to use cardiopulmonary resuscitation (CPR) if breathing or heartbeat stops.  · The use or non-use of tube feeding.  · Withholding of food and fluids.  · Comfort (palliative) care when the goal becomes comfort rather than a cure.  · Organ and tissue donation.  A living will does not give instructions for distributing your money and property if you should pass away. It is recommended that you seek the advice of a  when writing a will. Decisions about taxes, beneficiaries, and asset distribution will be legally binding. This process can relieve your family and friends of any concerns surrounding disputes or questions that may come up about  the distribution of your assets.  DNR or DNAR  A DNR or DNAR order is a request not to have CPR in the event that your heart stops beating or you stop breathing. If a DNR or DNAR order has not been made and shared, a health care provider will try to help any patient whose heart has stopped or who has stopped breathing. If you plan to have surgery, talk with your health care provider about how your DNR or DNAR order will be followed if problems occur.  Summary  · Advance directives are the legal documents that allow you to make choices ahead of time about your health care and medical treatment in case you become unable to communicate for yourself.  · The process of discussing and writing advance directives should happen over time. You can change the advance directives, even after you have signed them.  · Advance directives include DNR or DNAR orders, living mack, and designating an agent as your medical power of .  This information is not intended to replace advice given to you by your health care provider. Make sure you discuss any questions you have with your health care provider.  Document Released: 03/26/2009 Document Revised: 01/22/2020 Document Reviewed: 11/06/2017  [employer]vier Patient Education © 2020 [employer]

## 2020-07-21 NOTE — PROGRESS NOTES
The ABCs of the Annual Wellness Visit  Subsequent Medicare Wellness Visit    Chief Complaint   Patient presents with   • Medicare Wellness-subsequent       Subjective   History of Present Illness:  Renea Schmitz is a 69 y.o. female who presents for a Subsequent Medicare Wellness Visit.    She will also need a follow up on Hyperlipidemia, Depression, breast cancer history , MVP, and MARSHALL.    She is on Pristiq 100 mg daily for depression. She is working in her flower beds. She is watching her grandchildren. Denies lack of motivation, tearfulness, panic attacks.     She is on Crestor daily for hyperlipidemia. She tolerates it well.     She is followed by Dr. Jaime Puente for MVP. She has an ECG 11-8-2019 and ECHO 11-2-2017. She is followed by Dr. Ute Correa for breast cancer who referred her to [facility] for nodules of lung. Most likely seen as benign, repeat CT chest in 1 yr.         HEALTH RISK ASSESSMENT    Recent Hospitalizations:  No hospitalization(s) within the last year.    Current Medical Providers:  Patient Care Team:  Julienne Dupree APRN as PCP - General (Family Medicine)  Julienne Dupree APRN as PCP - Claims Attributed  Jaime Puente MD as Consulting Physician (Cardiology)  Ute Correa MD as Consulting Physician (Hematology and Oncology)  Aylin Parra APRN as Consulting Physician (Obstetrics and Gynecology)  Matt Troncoso MD as Referring Physician (General Surgery)  Aylin Parra APRN as Referring Physician (Obstetrics and Gynecology)    Smoking Status:  Social History     Tobacco Use   Smoking Status Never Smoker   Smokeless Tobacco Never Used       Alcohol Consumption:  Social History     Substance and Sexual Activity   Alcohol Use No       Depression Screen:   PHQ-2/PHQ-9 Depression Screening 7/21/2020   Little interest or pleasure in doing things 0   Feeling down, depressed, or hopeless 0   Trouble falling or staying asleep, or sleeping too much -   Feeling tired or having little  energy -   Poor appetite or overeating -   Feeling bad about yourself - or that you are a failure or have let yourself or your family down -   Trouble concentrating on things, such as reading the newspaper or watching television -   Moving or speaking so slowly that other people could have noticed. Or the opposite - being so fidgety or restless that you have been moving around a lot more than usual -   Thoughts that you would be better off dead, or of hurting yourself in some way -   Total Score 0   If you checked off any problems, how difficult have these problems made it for you to do your work, take care of things at home, or get along with other people? -       Fall Risk Screen:  OSCAR Fall Risk Assessment was completed, and patient is at LOW risk for falls.Assessment completed on:7/21/2020    Health Habits and Functional and Cognitive Screening:  Functional & Cognitive Status 7/21/2020   Do you have difficulty preparing food and eating? No   Do you have difficulty bathing yourself, getting dressed or grooming yourself? No   Do you have difficulty using the toilet? No   Do you have difficulty moving around from place to place? No   Do you have trouble with steps or getting out of a bed or a chair? No   Current Diet Unhealthy Diet   Dental Exam Up to date   Eye Exam Up to date   Exercise (times per week) 5 times per week   Current Exercise Activities Include Yard Work   Do you need help using the phone?  No   Are you deaf or do you have serious difficulty hearing?  No   Do you need help with transportation? No   Do you need help shopping? No   Do you need help preparing meals?  No   Do you need help with housework?  No   Do you need help with laundry? No   Do you need help taking your medications? No   Do you need help managing money? No   Do you ever drive or ride in a car without wearing a seat belt? No   Have you felt unusual stress, anger or loneliness in the last month? No   Who do you live with? Spouse    If you need help, do you have trouble finding someone available to you? No   Have you been bothered in the last four weeks by sexual problems? No   Do you have difficulty concentrating, remembering or making decisions? Yes         Does the patient have evidence of cognitive impairment? No    Asprin use counseling:Does not need ASA (and currently is not on it)    Age-appropriate Screening Schedule:  Refer to the list below for future screening recommendations based on patient's age, sex and/or medical conditions. Orders for these recommended tests are listed in the plan section. The patient has been provided with a written plan.    Health Maintenance   Topic Date Due   • INFLUENZA VACCINE  08/01/2020   • DXA SCAN  03/19/2021   • MAMMOGRAM  05/19/2021   • LIPID PANEL  07/14/2021   • TDAP/TD VACCINES (2 - Td) 07/01/2023   • COLONOSCOPY  10/10/2028   • ZOSTER VACCINE  Discontinued          The following portions of the patient's history were reviewed and updated as appropriate: allergies, current medications, past family history, past medical history, past social history, past surgical history and problem list.    Outpatient Medications Prior to Visit   Medication Sig Dispense Refill   • busPIRone (BUSPAR) 10 MG tablet Take 10 mg by mouth Daily As Needed.     • desvenlafaxine (PRISTIQ) 100 MG 24 hr tablet Take 1 tablet by mouth Daily. 90 tablet 3   • ketoconazole (NIZORAL) 2 % shampoo SHAMPOO SCALP TWICE WEEKLY  1   • Loratadine (CLARITIN) 10 MG capsule Take 10 mg by mouth Daily. 90 each 0   • meloxicam (MOBIC) 7.5 MG tablet Take 1 tablet by mouth Daily. 90 tablet 3   • mometasone (ELOCON) 0.1 % lotion APPLY TO SCALP EVERY DAY AS NEEDED FOR ITCHING  1   • montelukast (SINGULAIR) 10 MG tablet Take 10 mg by mouth Daily.     • omeprazole (priLOSEC) 40 MG capsule Take 40 mg by mouth Daily.     • rosuvastatin (CRESTOR) 20 MG tablet Take 1 tablet by mouth Daily. 90 tablet 1   • tamoxifen (NOLVADEX) 20 MG chemo tablet Take 1  tablet by mouth Daily. 10 tablet 0   • vitamin B-12 (CYANOCOBALAMIN) 1000 MCG tablet Take 1 tablet by mouth Daily. 90 tablet 3   • vitamin D (ERGOCALCIFEROL) 1.25 MG (40791 UT) capsule capsule Take 1 capsule by mouth 1 (One) Time Per Week. 12 capsule 3     No facility-administered medications prior to visit.        Patient Active Problem List   Diagnosis   • Malignant neoplasm of breast in female, estrogen receptor positive (CMS/HCC)   • Major depressive disorder   • Mitral regurgitation   • Hyperlipidemia LDL goal <100   • Mild sleep apnea   • Osteopenia   • Vitamin D deficiency   • Vitamin B 12 deficiency   • Benign thyroid cyst   • Medicare annual wellness visit, subsequent   • Depression with anxiety   • Osteoarthritis of both hips       Advanced Care Planning:  ACP discussion was held with the patient during this visit. Patient does not have an advance directive, information provided.    Review of Systems   Constitutional: Positive for fatigue.   HENT: Negative.    Eyes: Negative.    Respiratory: Negative.  Negative for apnea and chest tightness.    Gastrointestinal: Negative.    Endocrine: Negative.    Genitourinary: Negative.    Musculoskeletal: Positive for arthralgias (leg cramps).   Skin: Negative.    Allergic/Immunologic: Negative.  Negative for environmental allergies, food allergies and immunocompromised state.   Neurological: Negative.  Negative for dizziness.   Hematological: Negative.    Psychiatric/Behavioral: Negative.        Compared to one year ago, the patient feels her physical health is better.  Compared to one year ago, the patient feels her mental health is better.    Reviewed chart for potential of high risk medication in the elderly: yes  Reviewed chart for potential of harmful drug interactions in the elderly:yes    Objective         Vitals:    07/21/20 1001   BP: 118/78   Pulse: 76   Resp: 16   Temp: 98.1 °F (36.7 °C)   TempSrc: Oral   SpO2: 96%   Weight: 99.8 kg (220 lb)   Height: 170.2  "cm (67\")   PainSc: 0-No pain       Body mass index is 34.46 kg/m².  Discussed the patient's BMI with her. The BMI is above average; BMI management plan is completed.    Physical Exam   Constitutional: She is oriented to person, place, and time. She appears well-developed and well-nourished.   HENT:   Head: Normocephalic.   Right Ear: External ear normal.   Left Ear: External ear normal.   Nose: Nose normal.   Mouth/Throat: Oropharynx is clear and moist. No oropharyngeal exudate.   Neck: Neck supple.   Cardiovascular: Normal rate, regular rhythm and normal heart sounds.   No murmur heard.  Pulmonary/Chest: Effort normal and breath sounds normal. No stridor. No respiratory distress. She has no wheezes.   Abdominal: Soft. Bowel sounds are normal. She exhibits no distension.   Musculoskeletal: She exhibits no edema.   Neurological: She is alert and oriented to person, place, and time.   Skin: Skin is warm and dry.   Psychiatric: She has a normal mood and affect. Her behavior is normal.   Vitals reviewed.      Lab Results   Component Value Date    GLU 95 07/14/2020    CHLPL 151 07/14/2020    TRIG 77 07/14/2020    HDL 71 (H) 07/14/2020    LDL 65 07/14/2020    VLDL 15.4 07/14/2020        Assessment/Plan   Medicare Risks and Personalized Health Plan  CMS Preventative Services Quick Reference  Breast Cancer/Mammogram Screening  Cardiovascular risk  Dementia/Memory   Diabetic Lab Screening   Immunizations Discussed/Encouraged (specific immunizations; Shingrix and reacted to it )  Inactivity/Sedentary    The above risks/problems have been discussed with the patient.  Pertinent information has been shared with the patient in the After Visit Summary.  Follow up plans and orders are seen below in the Assessment/Plan Section.       Diagnosis Plan   1. Medicare annual wellness visit, subsequent  POC Urinalysis Dipstick, Automated   2. Hyperlipidemia LDL goal <100  Comprehensive metabolic panel    Conv Lipid Panel w/ Chol/HDL Ratio "    Comprehensive metabolic panel    Conv Lipid Panel w/ Chol/HDL Ratio   3. Vitamin B 12 deficiency     4. Depression with anxiety     5. Encounter for hepatitis C screening test for low risk patient  Hepatitis C Antibody   6. Nonrheumatic mitral valve regurgitation     7. Gastroesophageal reflux disease without esophagitis  omeprazole (priLOSEC) 40 MG capsule   8. Environmental allergies  montelukast (SINGULAIR) 10 MG tablet       Exercises and stretching given to her to reduce leg cramps. Us ea tennis ball for feet and foam roller for calves.     Follow Up:      6 months with labs    An After Visit Summary and PPPS were given to the patient.

## 2020-07-26 ENCOUNTER — RESULTS ENCOUNTER (OUTPATIENT)
Dept: INTERNAL MEDICINE | Facility: CLINIC | Age: 70
End: 2020-07-26

## 2020-07-26 DIAGNOSIS — E78.5 HYPERLIPIDEMIA LDL GOAL <100: ICD-10-CM

## 2020-08-24 RX ORDER — ROSUVASTATIN CALCIUM 20 MG/1
TABLET, COATED ORAL
Qty: 90 TABLET | Refills: 0 | Status: SHIPPED | OUTPATIENT
Start: 2020-08-24 | End: 2020-11-25

## 2020-08-24 RX ORDER — TAMOXIFEN CITRATE 20 MG/1
TABLET ORAL
Qty: 90 TABLET | Refills: 1 | Status: SHIPPED | OUTPATIENT
Start: 2020-08-24 | End: 2021-03-09

## 2020-11-25 RX ORDER — ROSUVASTATIN CALCIUM 20 MG/1
TABLET, COATED ORAL
Qty: 90 TABLET | Refills: 0 | Status: SHIPPED | OUTPATIENT
Start: 2020-11-25 | End: 2021-03-03 | Stop reason: SDUPTHER

## 2020-12-08 ENCOUNTER — OFFICE VISIT (OUTPATIENT)
Dept: ONCOLOGY | Facility: CLINIC | Age: 70
End: 2020-12-08

## 2020-12-08 ENCOUNTER — LAB (OUTPATIENT)
Dept: LAB | Facility: HOSPITAL | Age: 70
End: 2020-12-08

## 2020-12-08 VITALS
SYSTOLIC BLOOD PRESSURE: 137 MMHG | BODY MASS INDEX: 34.21 KG/M2 | HEART RATE: 82 BPM | TEMPERATURE: 97.6 F | DIASTOLIC BLOOD PRESSURE: 82 MMHG | WEIGHT: 218 LBS | HEIGHT: 67 IN

## 2020-12-08 DIAGNOSIS — Z17.0 MALIGNANT NEOPLASM OF BREAST IN FEMALE, ESTROGEN RECEPTOR POSITIVE, UNSPECIFIED LATERALITY, UNSPECIFIED SITE OF BREAST (HCC): Primary | ICD-10-CM

## 2020-12-08 DIAGNOSIS — Z17.0 MALIGNANT NEOPLASM OF BREAST IN FEMALE, ESTROGEN RECEPTOR POSITIVE, UNSPECIFIED LATERALITY, UNSPECIFIED SITE OF BREAST (HCC): ICD-10-CM

## 2020-12-08 DIAGNOSIS — C50.919 MALIGNANT NEOPLASM OF BREAST IN FEMALE, ESTROGEN RECEPTOR POSITIVE, UNSPECIFIED LATERALITY, UNSPECIFIED SITE OF BREAST (HCC): ICD-10-CM

## 2020-12-08 DIAGNOSIS — Z78.0 POST-MENOPAUSAL: ICD-10-CM

## 2020-12-08 DIAGNOSIS — C50.919 MALIGNANT NEOPLASM OF BREAST IN FEMALE, ESTROGEN RECEPTOR POSITIVE, UNSPECIFIED LATERALITY, UNSPECIFIED SITE OF BREAST (HCC): Primary | ICD-10-CM

## 2020-12-08 LAB
ALBUMIN SERPL-MCNC: 3.9 G/DL (ref 3.5–5.2)
ALBUMIN/GLOB SERPL: 1.4 G/DL (ref 1.1–2.4)
ALP SERPL-CCNC: 60 U/L (ref 38–116)
ALT SERPL W P-5'-P-CCNC: 11 U/L (ref 0–33)
ANION GAP SERPL CALCULATED.3IONS-SCNC: 10 MMOL/L (ref 5–15)
AST SERPL-CCNC: 17 U/L (ref 0–32)
BASOPHILS # BLD AUTO: 0.03 10*3/MM3 (ref 0–0.2)
BASOPHILS NFR BLD AUTO: 0.6 % (ref 0–1.5)
BILIRUB SERPL-MCNC: 0.3 MG/DL (ref 0.2–1.2)
BUN SERPL-MCNC: 23 MG/DL (ref 6–20)
BUN/CREAT SERPL: 32.9 (ref 7.3–30)
CALCIUM SPEC-SCNC: 9 MG/DL (ref 8.5–10.2)
CHLORIDE SERPL-SCNC: 107 MMOL/L (ref 98–107)
CO2 SERPL-SCNC: 25 MMOL/L (ref 22–29)
CREAT SERPL-MCNC: 0.7 MG/DL (ref 0.6–1.1)
DEPRECATED RDW RBC AUTO: 44.4 FL (ref 37–54)
EOSINOPHIL # BLD AUTO: 0.15 10*3/MM3 (ref 0–0.4)
EOSINOPHIL NFR BLD AUTO: 3.1 % (ref 0.3–6.2)
ERYTHROCYTE [DISTWIDTH] IN BLOOD BY AUTOMATED COUNT: 13.1 % (ref 12.3–15.4)
GFR SERPL CREATININE-BSD FRML MDRD: 82 ML/MIN/1.73
GLOBULIN UR ELPH-MCNC: 2.8 GM/DL (ref 1.8–3.5)
GLUCOSE SERPL-MCNC: 99 MG/DL (ref 74–124)
HCT VFR BLD AUTO: 37 % (ref 34–46.6)
HGB BLD-MCNC: 11.9 G/DL (ref 12–15.9)
IMM GRANULOCYTES # BLD AUTO: 0.01 10*3/MM3 (ref 0–0.05)
IMM GRANULOCYTES NFR BLD AUTO: 0.2 % (ref 0–0.5)
LYMPHOCYTES # BLD AUTO: 2.16 10*3/MM3 (ref 0.7–3.1)
LYMPHOCYTES NFR BLD AUTO: 44 % (ref 19.6–45.3)
MCH RBC QN AUTO: 29.8 PG (ref 26.6–33)
MCHC RBC AUTO-ENTMCNC: 32.2 G/DL (ref 31.5–35.7)
MCV RBC AUTO: 92.5 FL (ref 79–97)
MONOCYTES # BLD AUTO: 0.49 10*3/MM3 (ref 0.1–0.9)
MONOCYTES NFR BLD AUTO: 10 % (ref 5–12)
NEUTROPHILS NFR BLD AUTO: 2.07 10*3/MM3 (ref 1.7–7)
NEUTROPHILS NFR BLD AUTO: 42.1 % (ref 42.7–76)
NRBC BLD AUTO-RTO: 0 /100 WBC (ref 0–0.2)
PLATELET # BLD AUTO: 214 10*3/MM3 (ref 140–450)
PMV BLD AUTO: 9.2 FL (ref 6–12)
POTASSIUM SERPL-SCNC: 4.2 MMOL/L (ref 3.5–4.7)
PROT SERPL-MCNC: 6.7 G/DL (ref 6.3–8)
RBC # BLD AUTO: 4 10*6/MM3 (ref 3.77–5.28)
SODIUM SERPL-SCNC: 142 MMOL/L (ref 134–145)
WBC # BLD AUTO: 4.91 10*3/MM3 (ref 3.4–10.8)

## 2020-12-08 PROCEDURE — 80053 COMPREHEN METABOLIC PANEL: CPT | Performed by: NURSE PRACTITIONER

## 2020-12-08 PROCEDURE — 99213 OFFICE O/P EST LOW 20 MIN: CPT | Performed by: INTERNAL MEDICINE

## 2020-12-08 PROCEDURE — 85025 COMPLETE CBC W/AUTO DIFF WBC: CPT | Performed by: NURSE PRACTITIONER

## 2020-12-08 PROCEDURE — 36415 COLL VENOUS BLD VENIPUNCTURE: CPT | Performed by: NURSE PRACTITIONER

## 2020-12-08 NOTE — PROGRESS NOTES
Subjective   REASON FOR FOLLOWUP:    1.Right breast cancer T1c N0 ER/IA positive HER-2 negative  2.  Oncotype score of 20  3.  Radiation and Arimidex planned bone density shows osteopenia-Arimidex started in 2/17                               REQUESTING PHYSICIAN:  Trino Troncoso M.D.        History of Present Illness patient is a 70 a lady with a  right breast cancer T1 CN 0 ER/IA positive HER-2 negative here for follow-up almost 4 years from diagnosis.  At her last visit because of worsening bone density was switch her to tamoxifen and switched off from Prozac to Pristiq and she appears to be tolerating this better with less joint pains    She is almost 4 years into her hormonal therapy!    She does have back pain on and off and takes Mobic and I told her not to take Motrin with the Tylenol if possible.  There is no worsening of the pain and this is a chronic issue at this time    .  Mammograms were done in May and her next bone density is due in March of next year    Surgeon does not want to see her unless she has pain in the left hip at which time replacement would be indicated because of risk of fracture from the lipoma narrowing the cortex    Pulmonology is done 1 last CAT scan of the chest in March to follow-up on the PET positive nodes which are probably inflammatory and we will review this when we see her next    She has gained about 15 pounds over the last year and some of this is because the coronavirus pandemic we talked about weight loss and exercise    Past Medical History:   Diagnosis Date   • Anemia     prior to hysterectomy several years ago   • Anxiety    • Benign thyroid cyst 1971   • Breast CA (CMS/HCC) 12/01/2016    Right lumpectomy   • Depression    • Fibromyalgia    • Fracture of wrist    • H/O mammogram 12/04/2018   • Heart murmur    • History of ankle fracture     Right   • History of Papanicolaou smear of cervix 2016    Dr. Johnson   • Hypercholesterolemia    • Hyperlipidemia    • Mitral  valve regurgitation    • Mood swings    • MARSHALL on CPAP 1/22/2019   • PONV (postoperative nausea and vomiting)    • Sleep apnea         Past Surgical History:   Procedure Laterality Date   • BREAST BIOPSY     • BREAST LUMPECTOMY WITH SENTINEL NODE BIOPSY AND AXILLARY NODE DISSECTION Right 12/13/2016    Procedure: RT BREAST LUMPECTOMY WITH SENTINEL NODE BIOPSY & MAMMO NEEDLE LOC;  Surgeon: Matt Troncoso MD;  Location: [facility] OR [facility] [place];  Service:    • COLONOSCOPY N/A 10/10/2018    Procedure: COLONOSCOPY TO CECUM AND TERMINAL ILEUM WITH COLD BIOPSIES;  Surgeon: Carl Salas MD;  Location: [facility] ENDOSCOPY;  Service: Gastroenterology   • HAND SURGERY Left 2003    2 FINGERS PINNED   • HYSTERECTOMY  1995    Complete Sept 1995   • PERCUTANEOUS PINNING FINGER FRACTURE     • THYROID CYST EXCISION  1971   • TUBAL ABDOMINAL LIGATION        ONCOLOGIC HISTORY:   patient is a 66-year-old lady with a long history of depression hypercholesterolemia who was noted on routine mammogram on 11/9/16 to have an abnormality in the right breast at the 12 o'clock position.  This is not seen on a previous mammogram a year ago and this led to a diagnostic mammogram and ultrasound which confirmed a mass 12 o'clock position the right breast measuring about 1 cm by ultrasound with normal appearing right axillary lymph nodes.  A biopsy was done on 11/21/13 which revealed a grade 2 infiltrating ductal carcinoma ER/ID strongly positive HER-2 negative and the patient was referred to Dr. Troncoso.  Dr. Troncoso scheduled MRI of both breasts on 12/8/16 and this revealed a 1.8 x 1.3 cm mass in the right breast with no other abnormalities in the axilla or in the left breast and the patient was taken to surgery on 12/13/16 which time she had a needle localization lumpectomy and sentinel node biopsy.  Final pathology showed a 1.5 x 1.3 cm grade 2 infiltrating ductal carcinoma  With associated DCIS and margins that were clear after reexcision and one negative  sentinel node.  Patient is on well since surgery and is here to discuss adjuvant chemotherapy options.  She was  3 para 3 first childbirth was at night age 17 she did not breast-feed her children.  Menarche  at age 11 and menopause at age 45 when she had a hysterectomy and oophorectomy.  She took hormonal therapy for 10 years and stopped in  when her sister had breast cancer she was on birth control pills for at least 20 years prior to her hysterectomy.  She has a sister who developed breast cancer at age 58 and her second cancer  although one of these was DCIS and the other invasive cancer.  She took tamoxifen for 5 years  Is no other breast or ovarian cancer in the family but she has a very limited family as her father was an only child and her mother had 1 brother and she has one sister  Oncotype score returned at 20 and after looking at the risks and benefits of chemotherapy in this subset which I explained was not well understood at this point pending results of the [employer] trial we have opted to give her Arimidex and start radiation.  .    she went for genetic testing but she did not meet the criteria for testing and no further workup was done.  We talked again about long-term effects of Arimidex.  Her mood appears to be better with the higher dose of Prozac and BuSpar when necessary.  Her mother-in-law who is very difficult in general is staying with them the last 4 years and this is causing more stress for her but she realizes there is no other option      She was seen by orthopedics because of pain in her hip and when he imaged her hips he found what looked like a lipoma in the left hip her pain was actually in the right hip and they were concerned because of her history of breast cancer that there might be something malignant so we scheduled a PET scan and she is here today for follow-up    Thankfully the lesion in the left femur looks like a lipoma and has no uptake on PET scan but  incidentally she was found to have uptake in mediastinal nodes and some calcified and noncalcified lesions in her lung that appeared to be granulomatous disease.  She has never complained to me but her daughter states that she coughs all the time and therefore I think a pulmonary evaluation is required and we will send testing for histoplasmosis but it could also be Mycobacterium avium intracellulare.  There is mention of discrete uptake in the inferior right hepatic lobe with no corresponding lesion and I think we will wait for a while and repeat an MRI of the liver but some    6/20  We reviewed the results of her MRI of the liver and thankfully there is no suspicious lesions and just a small cyst.    Follow-up chest CT ordered by pulmonology was also reviewed and shows no change and no signs of active granulomatous disease    Mammogram from May was reviewed and thankfully benign    She is tolerating tamoxifen without problems and has had a hysterectomy so Pap smears are not indicated          Current Outpatient Medications on File Prior to Visit   Medication Sig Dispense Refill   • desvenlafaxine (PRISTIQ) 100 MG 24 hr tablet Take 1 tablet by mouth Daily. 90 tablet 3   • ketoconazole (NIZORAL) 2 % shampoo SHAMPOO SCALP TWICE WEEKLY  1   • Loratadine (CLARITIN) 10 MG capsule Take 10 mg by mouth Daily. 90 each 0   • meloxicam (MOBIC) 7.5 MG tablet Take 1 tablet by mouth Daily. 90 tablet 3   • mometasone (ELOCON) 0.1 % lotion APPLY TO SCALP EVERY DAY AS NEEDED FOR ITCHING  1   • montelukast (SINGULAIR) 10 MG tablet Take 10 mg by mouth Daily.     • omeprazole (priLOSEC) 40 MG capsule Take 40 mg by mouth Daily.     • rosuvastatin (CRESTOR) 20 MG tablet TAKE ONE TABLET BY MOUTH DAILY 90 tablet 0   • tamoxifen (NOLVADEX) 20 MG chemo tablet TAKE ONE TABLET BY MOUTH DAILY 90 tablet 1   • vitamin B-12 (CYANOCOBALAMIN) 1000 MCG tablet Take 1 tablet by mouth Daily. 90 tablet 3   • vitamin D (ERGOCALCIFEROL) 1.25 MG (19780 UT)  "capsule capsule Take 1 capsule by mouth 1 (One) Time Per Week. 12 capsule 3     No current facility-administered medications on file prior to visit.         ALLERGIES:    Allergies   Allergen Reactions   • Tegaderm Ag Mesh [Silver] Other (See Comments)     SKIN BLISTERS  SKIN BLISTERS   • Shingrix [Zoster Vac Recomb Adjuvanted] Other (See Comments)     \"ice water sensation in legs\"   • Sulfa Antibiotics Rash   • Zithromax [Azithromycin] Rash        Social History     Socioeconomic History   • Marital status:      Spouse name: Peter   • Number of children: Not on file   • Years of education: High school   • Highest education level: Not on file   Occupational History     Employer: RETIRED   Tobacco Use   • Smoking status: Never Smoker   • Smokeless tobacco: Never Used   Substance and Sexual Activity   • Alcohol use: No   • Drug use: No   • Sexual activity: Yes     Partners: Male     Birth control/protection: Surgical   Social History Narrative     is Peter.    Patient retired from a bank and factory work. [place], has [place] acres.        2 Natural daughters; 1 step-daughter and 1 Son    She watches her grand babies, has 12 grandchildren.         She has a flower garden.         Family History   Problem Relation Age of Onset   • Heart failure Mother    • Colon cancer Father    • Liver cancer Father    • Tuberculosis Father    • Breast cancer Sister 58        DCIS   • Depression Daughter       Father  at 65 of metastatic colon cancer his parents lived to their 80s without cancer mother  at 93 of CHF and had only one brother who had no cancer.  She has only one sister who had breast cancer at age 58 and again at 63 no ovarian cancer in the family  Review of Systems   Constitutional: Negative for activity change, appetite change, chills, fever and unexpected weight change.   Eyes: Positive for visual disturbance (cataract).   Respiratory: Negative for cough, chest tightness and shortness of " "breath.    Cardiovascular: Negative for chest pain and leg swelling.   Gastrointestinal: Negative for constipation, diarrhea, nausea and vomiting.   Genitourinary: Negative for difficulty urinating.   Musculoskeletal: Positive for back pain (after a period of standing ). Negative for joint swelling and neck pain.        TMJ   Neurological: Negative for dizziness and headaches.   Psychiatric/Behavioral: Positive for dysphoric mood (starting new meds tonight improved).   All other systems reviewed and are negative.   I have reviewed and confirmed the accuracy of the ROS as documented by the MA/LPN/RN Aaron Correa MD        Objective     Vitals:    12/08/20 1004   BP: 137/82   Pulse: 82   Temp: 97.6 °F (36.4 °C)   TempSrc: Skin   Weight: 98.9 kg (218 lb)   Height: 170.2 cm (67.01\")   PainSc:   5   PainLoc: Back  Comment: thoracic leading to lumbar region      Current Status 12/8/2020   ECOG score 0       Physical Exam    GENERAL:  Well-developed, well-nourished in no acute distress.   SKIN:  Warm, dry without rashes, purpura or petechiae.  EYES:  Pupils equal, round and reactive to light.  EOMs intact.  Conjunctivae normal.  EARS:  Hearing intact.  NOSE:  Septum midline.  No excoriations or nasal discharge.  MOUTH:  Tongue is well-papillated; no stomatitis or ulcers.  Lips normal.  THROAT:  Oropharynx without lesions or exudates.  NECK:  Supple with good range of motion; no thyromegaly or masses, no JVD.  LYMPHATICS:  No cervical, supraclavicular, axillary or inguinal adenopathy.  CHEST:  Lungs clear to auscultation. Good airflow.  BREASTS: Both breasts are benign lumpectomy scar in the right breast is well-healed but   CARDIAC:  Regular rate and rhythm without murmurs, rubs or gallops. Normal S1,S2.  ABDOMEN:  Soft, nontender with no hepatosplenomegaly or masses.  EXTREMITIES:  No clubbing, cyanosis or edema.  NEUROLOGICAL:  Cranial Nerves II-XII grossly intact.  No focal neurological " deficits.  PSYCHIATRIC:  Normal affect and mood.    I have reexamined the patient and the results are consistent with the previously documented exam. Aaron Correa MD         RECENT LABS:  Hematology WBC   Date Value Ref Range Status   12/08/2020 4.91 3.40 - 10.80 10*3/mm3 Final   07/14/2020 6.02 3.40 - 10.80 10*3/mm3 Final     RBC   Date Value Ref Range Status   12/08/2020 4.00 3.77 - 5.28 10*6/mm3 Final   07/14/2020 4.06 3.77 - 5.28 10*6/mm3 Final     Hemoglobin   Date Value Ref Range Status   12/08/2020 11.9 (L) 12.0 - 15.9 g/dL Final     Hematocrit   Date Value Ref Range Status   12/08/2020 37.0 34.0 - 46.6 % Final     Platelets   Date Value Ref Range Status   12/08/2020 214 140 - 450 10*3/mm3 Final      Dexa Scan      Study Result     F-18 FDG PET FROM SKULL BASE TO MID THIGH WITH PET/CT FUSION   IMPRESSION:  1.  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  2.  Focal moderate FDG uptake within the inferior right hepatic lobe  without a corresponding lesion seen on noncontrast CT. While findings  may be artifactual and represent displaced activity from the underlying  bowel, findings remain nonspecific and further evaluation with abdominal  MRI with and without contrast with Eovist is recommended to exclude  metastatic disease in this location.  3.  Moderate to intense FDG avid right hilar and mediastinal lymph nodes  which demonstrates discrete calcification and are likely granulomatous.  Focus of moderate to intense FDG uptake within the left hilum is present  without definite calcification seen on corresponding noncontrast CT.  Given the findings within the remainder of the mediastinum and hilum,  findings are favored to be granulomatous. However, findings remain  nonspecific. Correlation with prior imaging is recommended if available  to establish long-term stability. If insufficient prior imaging  is  available, recommend follow-up chest CT with contrast in 2 months to  ensure stability.  4.  Focus of intense FDG uptake within the right anterior gluteal  musculature and abutting the right iliac crest anteriorly. No  corresponding lesion is seen on noncontrast CT. While findings may be  reactive, they are nonspecific on PET and correlation with recent pelvic  MRI is recommended to determine if this area was included within the  field-of-view and if an underlying lesion is visualized. If this area  was not adequately evaluated on recent pelvic MRI, I recommend follow-up  pelvic MRI with and without contrast to exclude an underlying lesion in  this location.      MRI LIVER  IMPRESSION:  1.  No suspicious liver lesion is demonstrated. No evidence of  metastatic malignancy within the upper abdomen.  2.  Tiny benign cyst in the dome of the liver. Small bilateral benign  renal cysts.  3.  Cholelithiasis.   This report was finalized on 2/26/2020     CT CHEST  IMPRESSION:  No definite adenopathy is visible on this unenhanced chest  CT scan. There is shortening no change in the appearance of the hilar  regions as compared with PET CT scan 10/31/2019.     Lungs are clear except for calcified granulomas     Probably benign 8 mm low-density lesion in the liver. Although is not  visible on the PET scan, it was probably present and simply obscured by  motion on the nonbreath-holding images. Attention on future imaging is  recommended       05/19/2020                Assessment/Plan   1.  T1 CN 0M0 ER/MD positive HER-2 negative right-sided breast cancer  ·  oncotype score of 20 -Arimidex plus radiation planned started in 3/17  · Worsening bone density in 3/19 switch to tamoxifen in 8/19      2.  Mild mitral regurgitation  3 long history of depression on Prozac-Dose increased recently with improvement-change to Pristiq in 4/19    4.  Positive family history of breast cancer in her sister and colon cancer   in her father the  very small immediate family-saw the genetic counselors but they do not feel testing was appropriate    5.  Worsening bone density in 3/19 changed from Arimidex to tamoxifen    6.  Abnormal mass left hip felt to be lipoma PET scan dated 10/31 19- abnormal calcified hilar nodes and liver lesion noted on PET scan    7.  MRI of the abdomen dated 3/20 shows no liver lesions    8.  Cough and possible granulomatous lung disease referred to pulmonary    Plan  1.  Continue  Tamoxifen 20 mg daily   2. .return in 6 months   3.  DEXA scan in March and mammogram in May 2021   4.  Follow-up CT of the chest in March 2021     I encouraged her to call if she has any undue side effects from the tamoxifen and stressed the toxicity profile again

## 2021-01-04 RX ORDER — MELOXICAM 7.5 MG/1
TABLET ORAL
Qty: 90 TABLET | Refills: 2 | Status: SHIPPED | OUTPATIENT
Start: 2021-01-04 | End: 2021-10-15 | Stop reason: SDUPTHER

## 2021-02-04 DIAGNOSIS — F41.8 DEPRESSION WITH ANXIETY: ICD-10-CM

## 2021-02-04 DIAGNOSIS — M85.852 OSTEOPENIA OF LEFT HIP: ICD-10-CM

## 2021-02-04 RX ORDER — ERGOCALCIFEROL 1.25 MG/1
CAPSULE ORAL
Qty: 12 CAPSULE | Refills: 2 | Status: SHIPPED | OUTPATIENT
Start: 2021-02-04 | End: 2022-02-14

## 2021-02-04 RX ORDER — DESVENLAFAXINE 100 MG/1
TABLET, EXTENDED RELEASE ORAL
Qty: 30 TABLET | Refills: 0 | Status: SHIPPED | OUTPATIENT
Start: 2021-02-04 | End: 2021-03-03 | Stop reason: SDUPTHER

## 2021-03-03 ENCOUNTER — OFFICE VISIT (OUTPATIENT)
Dept: INTERNAL MEDICINE | Facility: CLINIC | Age: 71
End: 2021-03-03

## 2021-03-03 ENCOUNTER — HOSPITAL ENCOUNTER (OUTPATIENT)
Dept: CT IMAGING | Facility: HOSPITAL | Age: 71
Discharge: HOME | End: 2021-03-03
Admitting: INTERNAL MEDICINE

## 2021-03-03 VITALS
HEART RATE: 94 BPM | OXYGEN SATURATION: 96 % | HEIGHT: 67 IN | DIASTOLIC BLOOD PRESSURE: 76 MMHG | TEMPERATURE: 97.1 F | WEIGHT: 219.2 LBS | BODY MASS INDEX: 34.4 KG/M2 | RESPIRATION RATE: 18 BRPM | SYSTOLIC BLOOD PRESSURE: 134 MMHG

## 2021-03-03 DIAGNOSIS — E78.5 HYPERLIPIDEMIA LDL GOAL <100: Primary | ICD-10-CM

## 2021-03-03 DIAGNOSIS — E55.9 VITAMIN D DEFICIENCY: ICD-10-CM

## 2021-03-03 DIAGNOSIS — F41.8 DEPRESSION WITH ANXIETY: ICD-10-CM

## 2021-03-03 DIAGNOSIS — Z91.09 ENVIRONMENTAL ALLERGIES: ICD-10-CM

## 2021-03-03 DIAGNOSIS — R91.8 LUNG NODULES: ICD-10-CM

## 2021-03-03 DIAGNOSIS — K21.9 GASTROESOPHAGEAL REFLUX DISEASE WITHOUT ESOPHAGITIS: ICD-10-CM

## 2021-03-03 DIAGNOSIS — E53.8 VITAMIN B 12 DEFICIENCY: ICD-10-CM

## 2021-03-03 DIAGNOSIS — M16.0 PRIMARY OSTEOARTHRITIS OF BOTH HIPS: ICD-10-CM

## 2021-03-03 PROCEDURE — 71250 CT THORAX DX C-: CPT

## 2021-03-03 PROCEDURE — 99214 OFFICE O/P EST MOD 30 MIN: CPT | Performed by: NURSE PRACTITIONER

## 2021-03-03 RX ORDER — ROSUVASTATIN CALCIUM 20 MG/1
20 TABLET, COATED ORAL DAILY
Qty: 90 TABLET | Refills: 1 | Status: SHIPPED | OUTPATIENT
Start: 2021-03-03 | End: 2021-08-30

## 2021-03-03 RX ORDER — MONTELUKAST SODIUM 10 MG/1
10 TABLET ORAL NIGHTLY
Qty: 90 TABLET | Refills: 3 | Status: SHIPPED | OUTPATIENT
Start: 2021-03-03 | End: 2021-08-18 | Stop reason: SDUPTHER

## 2021-03-03 RX ORDER — OMEPRAZOLE 40 MG/1
40 CAPSULE, DELAYED RELEASE ORAL DAILY
Qty: 90 CAPSULE | Refills: 3 | Status: SHIPPED | OUTPATIENT
Start: 2021-03-03 | End: 2021-08-18 | Stop reason: SDUPTHER

## 2021-03-03 RX ORDER — DESVENLAFAXINE 100 MG/1
100 TABLET, EXTENDED RELEASE ORAL DAILY
Qty: 30 TABLET | Refills: 0 | Status: SHIPPED | OUTPATIENT
Start: 2021-03-03 | End: 2022-03-03 | Stop reason: SDUPTHER

## 2021-03-03 RX ORDER — DESVENLAFAXINE 100 MG/1
100 TABLET, EXTENDED RELEASE ORAL DAILY
Qty: 90 TABLET | Refills: 3 | Status: SHIPPED | OUTPATIENT
Start: 2021-03-03 | End: 2021-03-03 | Stop reason: SDUPTHER

## 2021-03-03 NOTE — PROGRESS NOTES
"Follow up on Hyperlipidemia    Subjective     Renae Schmitz is a 70 y.o. female being seen for a follow up appointment today regarding Hyperlipidemia, OA hips, GERD, and Depression. She has not been seen recently. She is on Crestor 20 mg for hyperlipidemia.  She tolerates this well.     She is Tolerating the pristiq 100 mg daily for depression. She reports that she feels down 2-3 days a month, and then \"snaps out of it.\"     She has primary OA of both hips. She takes Mobic daily for pain.     She takes omeprazole 40mg once daily for cough. She denies reflux, abd pain.     History of Present Illness     Allergies   Allergen Reactions   • Tegaderm Ag Mesh [Silver] Other (See Comments)     SKIN BLISTERS  SKIN BLISTERS   • Shingrix [Zoster Vac Recomb Adjuvanted] Other (See Comments)     \"ice water sensation in legs\"   • Sulfa Antibiotics Rash   • Zithromax [Azithromycin] Rash         Current Outpatient Medications:   •  desvenlafaxine (PRISTIQ) 100 MG 24 hr tablet, TAKE ONE TABLET BY MOUTH DAILY, Disp: 30 tablet, Rfl: 0  •  ketoconazole (NIZORAL) 2 % shampoo, SHAMPOO SCALP TWICE WEEKLY, Disp: , Rfl: 1  •  Loratadine (CLARITIN) 10 MG capsule, Take 10 mg by mouth Daily., Disp: 90 each, Rfl: 0  •  meloxicam (MOBIC) 7.5 MG tablet, TAKE ONE TABLET BY MOUTH DAILY, Disp: 90 tablet, Rfl: 2  •  mometasone (ELOCON) 0.1 % lotion, APPLY TO SCALP EVERY DAY AS NEEDED FOR ITCHING, Disp: , Rfl: 1  •  montelukast (SINGULAIR) 10 MG tablet, Take 10 mg by mouth Daily., Disp: , Rfl:   •  omeprazole (priLOSEC) 40 MG capsule, Take 40 mg by mouth Daily., Disp: , Rfl:   •  rosuvastatin (CRESTOR) 20 MG tablet, TAKE ONE TABLET BY MOUTH DAILY, Disp: 90 tablet, Rfl: 0  •  tamoxifen (NOLVADEX) 20 MG chemo tablet, TAKE ONE TABLET BY MOUTH DAILY, Disp: 90 tablet, Rfl: 1  •  vitamin B-12 (CYANOCOBALAMIN) 1000 MCG tablet, Take 1 tablet by mouth Daily., Disp: 90 tablet, Rfl: 3  •  vitamin D (ERGOCALCIFEROL) 1.25 MG (75828 UT) capsule capsule, TAKE ONE " CAPSULE BY MOUTH ONCE TIME PER WEEK, Disp: 12 capsule, Rfl: 2    The following portions of the patient's history were reviewed and updated as appropriate: allergies, current medications, past family history, past medical history, past social history, past surgical history and problem list.    Review of Systems   Constitutional: Negative.    HENT: Negative.    Eyes: Negative.  Negative for visual disturbance.   Respiratory: Negative.    Cardiovascular: Negative.    Musculoskeletal: Positive for arthralgias.   Psychiatric/Behavioral: Negative.    All other systems reviewed and are negative.      Assessment     Physical Exam  Vitals signs reviewed.   Constitutional:       Appearance: Normal appearance. She is obese. She is not ill-appearing.   Neck:      Musculoskeletal: Neck supple.   Cardiovascular:      Rate and Rhythm: Normal rate and regular rhythm.      Pulses: Normal pulses.      Heart sounds: Normal heart sounds. No murmur. No friction rub.   Pulmonary:      Effort: Pulmonary effort is normal. No respiratory distress.      Breath sounds: Normal breath sounds. No stridor.   Abdominal:      General: Abdomen is protuberant. Bowel sounds are normal. There is no distension.      Palpations: Abdomen is soft. There is no mass.      Tenderness: There is no abdominal tenderness.   Musculoskeletal: Normal range of motion.   Skin:     General: Skin is warm and dry.   Neurological:      General: No focal deficit present.      Mental Status: She is alert and oriented to person, place, and time.   Psychiatric:         Mood and Affect: Mood normal.         Behavior: Behavior normal.         Thought Content: Thought content normal.         Plan       Diagnoses and all orders for this visit:    1. Hyperlipidemia LDL goal <100 (Primary)  Comments:  Will need updated labs  Orders:  -     Comprehensive metabolic panel  -     Lipid panel  -     CBC w AUTO Differential  -     rosuvastatin (CRESTOR) 20 MG tablet; Take 1 tablet by  mouth Daily.  Dispense: 90 tablet; Refill: 1  -     Comprehensive metabolic panel; Future  -     Lipid panel; Future    2. Depression with anxiety  Comments:  Well controlle don Pristiq 100mg daily  Orders:  -     Discontinue: desvenlafaxine (PRISTIQ) 100 MG 24 hr tablet; Take 1 tablet by mouth Daily.  Dispense: 90 tablet; Refill: 3  -     desvenlafaxine (PRISTIQ) 100 MG 24 hr tablet; Take 1 tablet by mouth Daily.  Dispense: 30 tablet; Refill: 0    3. Vitamin B 12 deficiency  -     CBC w AUTO Differential; Future  -     Vitamin B12; Future    4. Vitamin D deficiency  -     Vitamin D 1,25 Dihydroxy; Future    5. Primary osteoarthritis of both hips  Comments:  Conitnue Mobic, will need to increase movement to reduce stiffness in hips    6. Environmental allergies  -     montelukast (SINGULAIR) 10 MG tablet; Take 1 tablet by mouth Every Night.  Dispense: 90 tablet; Refill: 3    7. Gastroesophageal reflux disease without esophagitis  -     omeprazole (priLOSEC) 40 MG capsule; Take 1 capsule by mouth Daily.  Dispense: 90 capsule; Refill: 3      Set up an AWV in 6 months with labs

## 2021-03-09 ENCOUNTER — LAB (OUTPATIENT)
Dept: INTERNAL MEDICINE | Facility: CLINIC | Age: 71
End: 2021-03-09

## 2021-03-09 DIAGNOSIS — E55.9 VITAMIN D DEFICIENCY: ICD-10-CM

## 2021-03-09 DIAGNOSIS — E78.5 HYPERLIPIDEMIA LDL GOAL <100: ICD-10-CM

## 2021-03-09 DIAGNOSIS — E53.8 VITAMIN B 12 DEFICIENCY: ICD-10-CM

## 2021-03-09 RX ORDER — TAMOXIFEN CITRATE 20 MG/1
TABLET ORAL
Qty: 90 TABLET | Refills: 0 | Status: SHIPPED | OUTPATIENT
Start: 2021-03-09 | End: 2021-06-01 | Stop reason: SDUPTHER

## 2021-03-10 PROBLEM — Z11.59 ENCOUNTER FOR HEPATITIS C SCREENING TEST FOR LOW RISK PATIENT: Status: RESOLVED | Noted: 2019-07-30 | Resolved: 2021-03-10

## 2021-03-10 LAB
1,25(OH)2D SERPL-MCNC: 118 PG/ML (ref 19.9–79.3)
ALBUMIN SERPL-MCNC: 4 G/DL (ref 3.5–5.2)
ALBUMIN/GLOB SERPL: 1.7 G/DL
ALP SERPL-CCNC: 59 U/L (ref 39–117)
ALT SERPL-CCNC: 12 U/L (ref 1–33)
AST SERPL-CCNC: 20 U/L (ref 1–32)
BASOPHILS # BLD AUTO: 0.03 10*3/MM3 (ref 0–0.2)
BASOPHILS NFR BLD AUTO: 0.6 % (ref 0–1.5)
BILIRUB SERPL-MCNC: 0.4 MG/DL (ref 0–1.2)
BUN SERPL-MCNC: 14 MG/DL (ref 8–23)
BUN/CREAT SERPL: 23 (ref 7–25)
CALCIUM SERPL-MCNC: 8.8 MG/DL (ref 8.6–10.5)
CHLORIDE SERPL-SCNC: 107 MMOL/L (ref 98–107)
CHOLEST SERPL-MCNC: 155 MG/DL (ref 0–200)
CO2 SERPL-SCNC: 25.7 MMOL/L (ref 22–29)
CREAT SERPL-MCNC: 0.61 MG/DL (ref 0.57–1)
EOSINOPHIL # BLD AUTO: 0.14 10*3/MM3 (ref 0–0.4)
EOSINOPHIL NFR BLD AUTO: 2.8 % (ref 0.3–6.2)
ERYTHROCYTE [DISTWIDTH] IN BLOOD BY AUTOMATED COUNT: 12.6 % (ref 12.3–15.4)
GLOBULIN SER CALC-MCNC: 2.4 GM/DL
GLUCOSE SERPL-MCNC: 89 MG/DL (ref 65–99)
HCT VFR BLD AUTO: 37.7 % (ref 34–46.6)
HDLC SERPL-MCNC: 78 MG/DL (ref 40–60)
HGB BLD-MCNC: 12.4 G/DL (ref 12–15.9)
IMM GRANULOCYTES # BLD AUTO: 0.01 10*3/MM3 (ref 0–0.05)
IMM GRANULOCYTES NFR BLD AUTO: 0.2 % (ref 0–0.5)
LDLC SERPL CALC-MCNC: 65 MG/DL (ref 0–100)
LYMPHOCYTES # BLD AUTO: 2.01 10*3/MM3 (ref 0.7–3.1)
LYMPHOCYTES NFR BLD AUTO: 40.5 % (ref 19.6–45.3)
MCH RBC QN AUTO: 30.5 PG (ref 26.6–33)
MCHC RBC AUTO-ENTMCNC: 32.9 G/DL (ref 31.5–35.7)
MCV RBC AUTO: 92.6 FL (ref 79–97)
MONOCYTES # BLD AUTO: 0.43 10*3/MM3 (ref 0.1–0.9)
MONOCYTES NFR BLD AUTO: 8.7 % (ref 5–12)
NEUTROPHILS # BLD AUTO: 2.34 10*3/MM3 (ref 1.7–7)
NEUTROPHILS NFR BLD AUTO: 47.2 % (ref 42.7–76)
NRBC BLD AUTO-RTO: 0 /100 WBC (ref 0–0.2)
PLATELET # BLD AUTO: 250 10*3/MM3 (ref 140–450)
POTASSIUM SERPL-SCNC: 4.5 MMOL/L (ref 3.5–5.2)
PROT SERPL-MCNC: 6.4 G/DL (ref 6–8.5)
RBC # BLD AUTO: 4.07 10*6/MM3 (ref 3.77–5.28)
SODIUM SERPL-SCNC: 141 MMOL/L (ref 136–145)
TRIGL SERPL-MCNC: 60 MG/DL (ref 0–150)
VIT B12 SERPL-MCNC: 1176 PG/ML (ref 211–946)
VLDLC SERPL CALC-MCNC: 12 MG/DL (ref 5–40)
WBC # BLD AUTO: 4.96 10*3/MM3 (ref 3.4–10.8)

## 2021-03-15 ENCOUNTER — BULK ORDERING (OUTPATIENT)
Dept: CASE MANAGEMENT | Facility: OTHER | Age: 71
End: 2021-03-15

## 2021-03-15 DIAGNOSIS — Z23 IMMUNIZATION DUE: ICD-10-CM

## 2021-05-20 ENCOUNTER — HOSPITAL ENCOUNTER (OUTPATIENT)
Dept: MAMMOGRAPHY | Facility: HOSPITAL | Age: 71
Discharge: HOME | End: 2021-05-20

## 2021-05-20 ENCOUNTER — APPOINTMENT (OUTPATIENT)
Dept: BONE DENSITY | Facility: HOSPITAL | Age: 71
End: 2021-05-20

## 2021-05-20 DIAGNOSIS — C50.919 MALIGNANT NEOPLASM OF BREAST IN FEMALE, ESTROGEN RECEPTOR POSITIVE, UNSPECIFIED LATERALITY, UNSPECIFIED SITE OF BREAST (HCC): ICD-10-CM

## 2021-05-20 DIAGNOSIS — Z17.0 MALIGNANT NEOPLASM OF BREAST IN FEMALE, ESTROGEN RECEPTOR POSITIVE, UNSPECIFIED LATERALITY, UNSPECIFIED SITE OF BREAST (HCC): ICD-10-CM

## 2021-05-20 DIAGNOSIS — Z78.0 POST-MENOPAUSAL: ICD-10-CM

## 2021-05-20 PROCEDURE — 77080 DXA BONE DENSITY AXIAL: CPT

## 2021-05-20 PROCEDURE — 77063 BREAST TOMOSYNTHESIS BI: CPT

## 2021-05-20 PROCEDURE — 77067 SCR MAMMO BI INCL CAD: CPT

## 2021-06-01 RX ORDER — TAMOXIFEN CITRATE 20 MG/1
20 TABLET ORAL DAILY
Qty: 90 TABLET | Refills: 0 | Status: SHIPPED | OUTPATIENT
Start: 2021-06-01 | End: 2021-08-30

## 2021-06-14 NOTE — PROGRESS NOTES
Subjective   REASON FOR FOLLOWUP:    1.Right breast cancer T1c N0 ER/VT positive HER-2 negative  2.  Oncotype score of 20  3.  Radiation and Arimidex planned bone density shows osteopenia-Arimidex started in 2/17                               REQUESTING PHYSICIAN:  Trino Troncoso M.D.        History of Present Illness patient is a 70 a lady with a  right breast cancer T1 CN 0 ER/VT positive HER-2 negative here for follow-up  4.25 years from diagnosis.  At her last visit because of worsening bone density was switch her to tamoxifen and switched off from Prozac to Pristiq and she appears to be tolerating this better with less joint pains    She is almost 4+ years into her hormonal therapy!    She does have back pain on and off and takes Mobic and I told her not to take Motrin with the Tylenol if possible.  There is no worsening of the pain and this is a chronic issue at this time    .  Mammograms were done in May and her next bone density also and this shows an improvement in the lumbar spine and in her femoral neck so we will continue tamoxifen    Pulmonology is done 1 last CAT scan of the chest in March 2021 to follow-up on the PET positive nodes which are probably inflammatory and this was benign    She is very anxious when she drives to [place] and wants to know if she can go to a peripheral site but his father from here such as [place] and I think this is an excellent idea and I will set her up to see Dr. Ceja in [place] in 6 months    We talked about extending her hormonal therapy to 7 years and she is agreeable    Past Medical History:   Diagnosis Date   • Anemia     prior to hysterectomy several years ago   • Anxiety    • Benign thyroid cyst 1971   • Breast CA (CMS/HCC) 12/01/2016    Right lumpectomy   • Depression    • Fibromyalgia    • Fracture of wrist    • H/O mammogram 12/04/2018   • Heart murmur    • History of ankle fracture     Right   • History of Papanicolaou smear of cervix 2016    Dr. Johnson    • Hypercholesterolemia    • Hyperlipidemia    • Mitral valve regurgitation    • Mood swings    • MARSHALL on CPAP 1/22/2019   • PONV (postoperative nausea and vomiting)    • Sleep apnea         Past Surgical History:   Procedure Laterality Date   • BREAST BIOPSY     • BREAST LUMPECTOMY WITH SENTINEL NODE BIOPSY AND AXILLARY NODE DISSECTION Right 12/13/2016    Procedure: RT BREAST LUMPECTOMY WITH SENTINEL NODE BIOPSY & MAMMO NEEDLE LOC;  Surgeon: Matt Troncoso MD;  Location: [facility] OR [facility];  Service:    • COLONOSCOPY N/A 10/10/2018    Procedure: COLONOSCOPY TO CECUM AND TERMINAL ILEUM WITH COLD BIOPSIES;  Surgeon: Carl Salas MD;  Location: [facility] ENDOSCOPY;  Service: Gastroenterology   • HAND SURGERY Left 2003    2 FINGERS PINNED   • HYSTERECTOMY  1995    Complete Sept 1995   • PERCUTANEOUS PINNING FINGER FRACTURE     • THYROID CYST EXCISION  1971   • TUBAL ABDOMINAL LIGATION        ONCOLOGIC HISTORY:   patient is a 66-year-old lady with a long history of depression hypercholesterolemia who was noted on routine mammogram on 11/9/16 to have an abnormality in the right breast at the 12 o'clock position.  This is not seen on a previous mammogram a year ago and this led to a diagnostic mammogram and ultrasound which confirmed a mass 12 o'clock position the right breast measuring about 1 cm by ultrasound with normal appearing right axillary lymph nodes.  A biopsy was done on 11/21/13 which revealed a grade 2 infiltrating ductal carcinoma ER/NJ strongly positive HER-2 negative and the patient was referred to Dr. Troncoso.  Dr. Troncoso scheduled MRI of both breasts on 12/8/16 and this revealed a 1.8 x 1.3 cm mass in the right breast with no other abnormalities in the axilla or in the left breast and the patient was taken to surgery on 12/13/16 which time she had a needle localization lumpectomy and sentinel node biopsy.  Final pathology showed a 1.5 x 1.3 cm grade 2 infiltrating ductal carcinoma  With associated DCIS and  margins that were clear after reexcision and one negative sentinel node.  Patient is on well since surgery and is here to discuss adjuvant chemotherapy options.  She was  3 para 3 first childbirth was at night age 17 she did not breast-feed her children.  Menarche  at age 11 and menopause at age 45 when she had a hysterectomy and oophorectomy.  She took hormonal therapy for 10 years and stopped in  when her sister had breast cancer she was on birth control pills for at least 20 years prior to her hysterectomy.  She has a sister who developed breast cancer at age 58 and her second cancer  although one of these was DCIS and the other invasive cancer.  She took tamoxifen for 5 years  Is no other breast or ovarian cancer in the family but she has a very limited family as her father was an only child and her mother had 1 brother and she has one sister  Oncotype score returned at 20 and after looking at the risks and benefits of chemotherapy in this subset which I explained was not well understood at this point pending results of the [employer] trial we have opted to give her Arimidex and start radiation.  .    she went for genetic testing but she did not meet the criteria for testing and no further workup was done.  We talked again about long-term effects of Arimidex.  Her mood appears to be better with the higher dose of Prozac and BuSpar when necessary.  Her mother-in-law who is very difficult in general is staying with them the last 4 years and this is causing more stress for her but she realizes there is no other option      She was seen by orthopedics because of pain in her hip and when he imaged her hips he found what looked like a lipoma in the left hip her pain was actually in the right hip and they were concerned because of her history of breast cancer that there might be something malignant so we scheduled a PET scan and she is here today for follow-up    Thankfully the lesion in the left  femur looks like a lipoma and has no uptake on PET scan but incidentally she was found to have uptake in mediastinal nodes and some calcified and noncalcified lesions in her lung that appeared to be granulomatous disease.  She has never complained to me but her daughter states that she coughs all the time and therefore I think a pulmonary evaluation is required and we will send testing for histoplasmosis but it could also be Mycobacterium avium intracellulare.  There is mention of discrete uptake in the inferior right hepatic lobe with no corresponding lesion and I think we will wait for a while and repeat an MRI of the liver but some    6/20  We reviewed the results of her MRI of the liver and thankfully there is no suspicious lesions and just a small cyst.    Follow-up chest CT ordered by pulmonology was also reviewed and shows no change and no signs of active granulomatous disease    Mammogram from May was reviewed and thankfully benign    She is tolerating tamoxifen without problems and has had a hysterectomy so Pap smears are not indicated          Current Outpatient Medications on File Prior to Visit   Medication Sig Dispense Refill   • desvenlafaxine (PRISTIQ) 100 MG 24 hr tablet Take 1 tablet by mouth Daily. 30 tablet 0   • ketoconazole (NIZORAL) 2 % shampoo SHAMPOO SCALP TWICE WEEKLY  1   • Loratadine (CLARITIN) 10 MG capsule Take 10 mg by mouth Daily. 90 each 0   • meloxicam (MOBIC) 7.5 MG tablet TAKE ONE TABLET BY MOUTH DAILY 90 tablet 2   • mometasone (ELOCON) 0.1 % lotion APPLY TO SCALP EVERY DAY AS NEEDED FOR ITCHING  1   • montelukast (SINGULAIR) 10 MG tablet Take 1 tablet by mouth Every Night. 90 tablet 3   • omeprazole (priLOSEC) 40 MG capsule Take 1 capsule by mouth Daily. 90 capsule 3   • rosuvastatin (CRESTOR) 20 MG tablet Take 1 tablet by mouth Daily. 90 tablet 1   • tamoxifen (NOLVADEX) 20 MG chemo tablet Take 1 tablet by mouth Daily. 90 tablet 0   • vitamin B-12 (CYANOCOBALAMIN) 1000 MCG  "tablet Take 1 tablet by mouth Daily. 90 tablet 3   • vitamin D (ERGOCALCIFEROL) 1.25 MG (71417 UT) capsule capsule TAKE ONE CAPSULE BY MOUTH ONCE TIME PER WEEK 12 capsule 2     No current facility-administered medications on file prior to visit.        ALLERGIES:    Allergies   Allergen Reactions   • Tegaderm Ag Mesh [Silver] Other (See Comments)     SKIN BLISTERS  SKIN BLISTERS   • Shingrix [Zoster Vac Recomb Adjuvanted] Other (See Comments)     \"ice water sensation in legs\"   • Sulfa Antibiotics Rash   • Zithromax [Azithromycin] Rash        Social History     Socioeconomic History   • Marital status:      Spouse name: Peter   • Number of children: Not on file   • Years of education: High school   • Highest education level: Not on file   Tobacco Use   • Smoking status: Never Smoker   • Smokeless tobacco: Never Used   Substance and Sexual Activity   • Alcohol use: No   • Drug use: No   • Sexual activity: Yes     Partners: Male     Birth control/protection: Surgical        Family History   Problem Relation Age of Onset   • Heart failure Mother    • Colon cancer Father    • Liver cancer Father    • Tuberculosis Father    • Breast cancer Sister 58        DCIS   • Depression Daughter       Father  at 65 of metastatic colon cancer his parents lived to their 80s without cancer mother  at 93 of CHF and had only one brother who had no cancer.  She has only one sister who had breast cancer at age 58 and again at 63 no ovarian cancer in the family  Review of Systems   Constitutional: Negative for activity change, appetite change, chills, fever and unexpected weight change.   Eyes: Positive for visual disturbance (cataract).   Respiratory: Negative for cough, chest tightness and shortness of breath.    Cardiovascular: Negative for chest pain and leg swelling.   Gastrointestinal: Negative for constipation, diarrhea, nausea and vomiting.   Genitourinary: Negative for difficulty urinating.   Musculoskeletal: " "Positive for back pain (after a period of standing ). Negative for joint swelling and neck pain.        TMJ   Neurological: Negative for dizziness and headaches.   Psychiatric/Behavioral: Positive for dysphoric mood (starting new meds tonight improved).   All other systems reviewed and are negative.   I have reviewed and confirmed the accuracy of the ROS as documented by the MA/LPN/RN Aaron Correa MD        Objective     Vitals:    06/15/21 1110   BP: 139/81   Pulse: 85   Resp: 16   Temp: 97.3 °F (36.3 °C)   TempSrc: Skin   SpO2: 95%   Weight: 99.4 kg (219 lb 3.2 oz)   Height: 170.2 cm (67.01\")   PainSc: 0-No pain      Current Status 6/15/2021   ECOG score 0       Physical Exam    GENERAL:  Well-developed, well-nourished in no acute distress.   SKIN:  Warm, dry without rashes, purpura or petechiae.  NECK:  Supple with good range of motion; no thyromegaly or masses, no JVD.  LYMPHATICS:  No cervical, supraclavicular, axillary or inguinal adenopathy.  CHEST:  Lungs clear to auscultation. Good airflow.  BREASTS: Both breasts are benign lumpectomy scar in the right breast is well-healed but   CARDIAC:  Regular rate and rhythm without murmurs, rubs or gallops. Normal S1,S2.  ABDOMEN:  Soft, nontender with no hepatosplenomegaly or masses.  EXTREMITIES:  No clubbing, cyanosis or edema.  NEUROLOGICAL:  Cranial Nerves II-XII grossly intact.  No focal neurological deficits.  PSYCHIATRIC:  Normal affect and mood.     I have reexamined the patient and the results are consistent with the previously documented exam. Aaron Correa MD           RECENT LABS:  Hematology WBC   Date Value Ref Range Status   06/15/2021 6.11 3.40 - 10.80 10*3/mm3 Final   03/09/2021 4.96 3.40 - 10.80 10*3/mm3 Final     RBC   Date Value Ref Range Status   06/15/2021 4.10 3.77 - 5.28 10*6/mm3 Final   03/09/2021 4.07 3.77 - 5.28 10*6/mm3 Final     Hemoglobin   Date Value Ref Range Status   06/15/2021 12.4 12.0 - 15.9 g/dL Final "     Hematocrit   Date Value Ref Range Status   06/15/2021 38.4 34.0 - 46.6 % Final     Platelets   Date Value Ref Range Status   06/15/2021 232 140 - 450 10*3/mm3 Final      Dexa Scan      Study Result     F-18 FDG PET FROM SKULL BASE TO MID THIGH WITH PET/CT FUSION   IMPRESSION:  1.  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  2.  Focal moderate FDG uptake within the inferior right hepatic lobe  without a corresponding lesion seen on noncontrast CT. While findings  may be artifactual and represent displaced activity from the underlying  bowel, findings remain nonspecific and further evaluation with abdominal  MRI with and without contrast with Eovist is recommended to exclude  metastatic disease in this location.  3.  Moderate to intense FDG avid right hilar and mediastinal lymph nodes  which demonstrates discrete calcification and are likely granulomatous.  Focus of moderate to intense FDG uptake within the left hilum is present  without definite calcification seen on corresponding noncontrast CT.  Given the findings within the remainder of the mediastinum and hilum,  findings are favored to be granulomatous. However, findings remain  nonspecific. Correlation with prior imaging is recommended if available  to establish long-term stability. If insufficient prior imaging is  available, recommend follow-up chest CT with contrast in 2 months to  ensure stability.  4.  Focus of intense FDG uptake within the right anterior gluteal  musculature and abutting the right iliac crest anteriorly. No  corresponding lesion is seen on noncontrast CT. While findings may be  reactive, they are nonspecific on PET and correlation with recent pelvic  MRI is recommended to determine if this area was included within the  field-of-view and if an underlying lesion is visualized. If this area  was not adequately evaluated on recent pelvic  MRI, I recommend follow-up  pelvic MRI with and without contrast to exclude an underlying lesion in  this location.      MRI LIVER  IMPRESSION:  1.  No suspicious liver lesion is demonstrated. No evidence of  metastatic malignancy within the upper abdomen.  2.  Tiny benign cyst in the dome of the liver. Small bilateral benign  renal cysts.  3.  Cholelithiasis.   This report was finalized on 2/26/2020     CT CHEST  IMPRESSION:  No definite adenopathy is visible on this unenhanced chest  CT scan. There is shortening no change in the appearance of the hilar  regions as compared with PET CT scan 10/31/2019.     Lungs are clear except for calcified granulomas     Probably benign 8 mm low-density lesion in the liver. Although is not  visible on the PET scan, it was probably present and simply obscured by  motion on the nonbreath-holding images. Attention on future imaging is  recommended       05/19/2020                Assessment/Plan   1.  T1 CN 0M0 ER/AR positive HER-2 negative right-sided breast cancer  ·  oncotype score of 20 -Arimidex plus radiation planned started in 3/17  · Worsening bone density in 3/19 switch to tamoxifen in 8/19      2.  Mild mitral regurgitation  3 long history of depression on Prozac-Dose increased recently with improvement-change to Pristiq in 4/19    4.  Positive family history of breast cancer in her sister and colon cancer   in her father the very small immediate family-saw the genetic counselors but they do not feel testing was appropriate    5.  Worsening bone density in 3/19 changed from Arimidex to tamoxifen  · Bone density in 5/21 shows slight improvement    6.  Abnormal mass left hip felt to be lipoma PET scan dated 10/31 19- abnormal calcified hilar nodes and liver lesion noted on PET scan    7.  MRI of the abdomen dated 3/20 shows no liver lesions    8.  Cough and possible granulomatous lung disease referred to pulmonary in 2019  Final CAT scan in 3/21 is benign    Plan  1.   Continue  Tamoxifen 20 mg daily   2. .return in 6 months to see Dr. Ceja in [place] for her convenience  3.   mammogram due in in May 2022       I encouraged her to call if she has any undue side effects from the tamoxifen and stressed the toxicity profile again and also the plans to continue to 7 years if she tolerates it well

## 2021-06-15 ENCOUNTER — LAB (OUTPATIENT)
Dept: LAB | Facility: HOSPITAL | Age: 71
End: 2021-06-15

## 2021-06-15 ENCOUNTER — OFFICE VISIT (OUTPATIENT)
Dept: ONCOLOGY | Facility: CLINIC | Age: 71
End: 2021-06-15

## 2021-06-15 VITALS
TEMPERATURE: 97.3 F | OXYGEN SATURATION: 95 % | BODY MASS INDEX: 34.4 KG/M2 | RESPIRATION RATE: 16 BRPM | SYSTOLIC BLOOD PRESSURE: 139 MMHG | HEART RATE: 85 BPM | WEIGHT: 219.2 LBS | HEIGHT: 67 IN | DIASTOLIC BLOOD PRESSURE: 81 MMHG

## 2021-06-15 DIAGNOSIS — Z17.0 MALIGNANT NEOPLASM OF BREAST IN FEMALE, ESTROGEN RECEPTOR POSITIVE, UNSPECIFIED LATERALITY, UNSPECIFIED SITE OF BREAST (HCC): ICD-10-CM

## 2021-06-15 DIAGNOSIS — Z79.810 LONG-TERM CURRENT USE OF TAMOXIFEN: ICD-10-CM

## 2021-06-15 DIAGNOSIS — C50.011 MALIGNANT NEOPLASM OF AREOLA OF RIGHT BREAST IN FEMALE, ESTROGEN RECEPTOR POSITIVE (HCC): Primary | ICD-10-CM

## 2021-06-15 DIAGNOSIS — Z17.0 MALIGNANT NEOPLASM OF AREOLA OF RIGHT BREAST IN FEMALE, ESTROGEN RECEPTOR POSITIVE (HCC): Primary | ICD-10-CM

## 2021-06-15 DIAGNOSIS — C50.919 MALIGNANT NEOPLASM OF BREAST IN FEMALE, ESTROGEN RECEPTOR POSITIVE, UNSPECIFIED LATERALITY, UNSPECIFIED SITE OF BREAST (HCC): ICD-10-CM

## 2021-06-15 LAB
ALBUMIN SERPL-MCNC: 4.1 G/DL (ref 3.5–5.2)
ALBUMIN/GLOB SERPL: 1.4 G/DL (ref 1.1–2.4)
ALP SERPL-CCNC: 66 U/L (ref 38–116)
ALT SERPL W P-5'-P-CCNC: 16 U/L (ref 0–33)
ANION GAP SERPL CALCULATED.3IONS-SCNC: 10.6 MMOL/L (ref 5–15)
AST SERPL-CCNC: 20 U/L (ref 0–32)
BASOPHILS # BLD AUTO: 0.04 10*3/MM3 (ref 0–0.2)
BASOPHILS NFR BLD AUTO: 0.7 % (ref 0–1.5)
BILIRUB SERPL-MCNC: 0.3 MG/DL (ref 0.2–1.2)
BUN SERPL-MCNC: 23 MG/DL (ref 6–20)
BUN/CREAT SERPL: 28.4 (ref 7.3–30)
CALCIUM SPEC-SCNC: 9.1 MG/DL (ref 8.5–10.2)
CHLORIDE SERPL-SCNC: 106 MMOL/L (ref 98–107)
CO2 SERPL-SCNC: 23.4 MMOL/L (ref 22–29)
CREAT SERPL-MCNC: 0.81 MG/DL (ref 0.6–1.1)
DEPRECATED RDW RBC AUTO: 45.5 FL (ref 37–54)
EOSINOPHIL # BLD AUTO: 0.18 10*3/MM3 (ref 0–0.4)
EOSINOPHIL NFR BLD AUTO: 2.9 % (ref 0.3–6.2)
ERYTHROCYTE [DISTWIDTH] IN BLOOD BY AUTOMATED COUNT: 13.3 % (ref 12.3–15.4)
GFR SERPL CREATININE-BSD FRML MDRD: 70 ML/MIN/1.73
GLOBULIN UR ELPH-MCNC: 3 GM/DL (ref 1.8–3.5)
GLUCOSE SERPL-MCNC: 101 MG/DL (ref 74–124)
HCT VFR BLD AUTO: 38.4 % (ref 34–46.6)
HGB BLD-MCNC: 12.4 G/DL (ref 12–15.9)
IMM GRANULOCYTES # BLD AUTO: 0.02 10*3/MM3 (ref 0–0.05)
IMM GRANULOCYTES NFR BLD AUTO: 0.3 % (ref 0–0.5)
LYMPHOCYTES # BLD AUTO: 2.15 10*3/MM3 (ref 0.7–3.1)
LYMPHOCYTES NFR BLD AUTO: 35.2 % (ref 19.6–45.3)
MCH RBC QN AUTO: 30.2 PG (ref 26.6–33)
MCHC RBC AUTO-ENTMCNC: 32.3 G/DL (ref 31.5–35.7)
MCV RBC AUTO: 93.7 FL (ref 79–97)
MONOCYTES # BLD AUTO: 0.56 10*3/MM3 (ref 0.1–0.9)
MONOCYTES NFR BLD AUTO: 9.2 % (ref 5–12)
NEUTROPHILS NFR BLD AUTO: 3.16 10*3/MM3 (ref 1.7–7)
NEUTROPHILS NFR BLD AUTO: 51.7 % (ref 42.7–76)
NRBC BLD AUTO-RTO: 0 /100 WBC (ref 0–0.2)
PLATELET # BLD AUTO: 232 10*3/MM3 (ref 140–450)
PMV BLD AUTO: 9.1 FL (ref 6–12)
POTASSIUM SERPL-SCNC: 4.3 MMOL/L (ref 3.5–4.7)
PROT SERPL-MCNC: 7.1 G/DL (ref 6.3–8)
RBC # BLD AUTO: 4.1 10*6/MM3 (ref 3.77–5.28)
SODIUM SERPL-SCNC: 140 MMOL/L (ref 134–145)
WBC # BLD AUTO: 6.11 10*3/MM3 (ref 3.4–10.8)

## 2021-06-15 PROCEDURE — 85025 COMPLETE CBC W/AUTO DIFF WBC: CPT

## 2021-06-15 PROCEDURE — 99214 OFFICE O/P EST MOD 30 MIN: CPT | Performed by: INTERNAL MEDICINE

## 2021-06-15 PROCEDURE — 36415 COLL VENOUS BLD VENIPUNCTURE: CPT

## 2021-06-15 PROCEDURE — 80053 COMPREHEN METABOLIC PANEL: CPT

## 2021-08-18 ENCOUNTER — OFFICE VISIT (OUTPATIENT)
Dept: INTERNAL MEDICINE | Facility: CLINIC | Age: 71
End: 2021-08-18

## 2021-08-18 VITALS
HEIGHT: 67 IN | SYSTOLIC BLOOD PRESSURE: 130 MMHG | DIASTOLIC BLOOD PRESSURE: 82 MMHG | WEIGHT: 219.2 LBS | TEMPERATURE: 97.3 F | OXYGEN SATURATION: 97 % | HEART RATE: 88 BPM | BODY MASS INDEX: 34.4 KG/M2

## 2021-08-18 DIAGNOSIS — M85.852 OSTEOPENIA OF LEFT HIP: ICD-10-CM

## 2021-08-18 DIAGNOSIS — Z91.09 ENVIRONMENTAL ALLERGIES: ICD-10-CM

## 2021-08-18 DIAGNOSIS — E78.5 HYPERLIPIDEMIA LDL GOAL <100: ICD-10-CM

## 2021-08-18 DIAGNOSIS — F41.8 DEPRESSION WITH ANXIETY: ICD-10-CM

## 2021-08-18 DIAGNOSIS — K21.9 GASTROESOPHAGEAL REFLUX DISEASE WITHOUT ESOPHAGITIS: ICD-10-CM

## 2021-08-18 DIAGNOSIS — Z00.00 ENCOUNTER FOR ANNUAL WELLNESS VISIT (AWV) IN MEDICARE PATIENT: Primary | ICD-10-CM

## 2021-08-18 DIAGNOSIS — M16.0 PRIMARY OSTEOARTHRITIS OF BOTH HIPS: ICD-10-CM

## 2021-08-18 DIAGNOSIS — Z11.59 NEED FOR HEPATITIS C SCREENING TEST: ICD-10-CM

## 2021-08-18 PROCEDURE — 99214 OFFICE O/P EST MOD 30 MIN: CPT | Performed by: NURSE PRACTITIONER

## 2021-08-18 PROCEDURE — G0439 PPPS, SUBSEQ VISIT: HCPCS | Performed by: NURSE PRACTITIONER

## 2021-08-18 PROCEDURE — 93000 ELECTROCARDIOGRAM COMPLETE: CPT | Performed by: NURSE PRACTITIONER

## 2021-08-18 RX ORDER — OMEPRAZOLE 40 MG/1
40 CAPSULE, DELAYED RELEASE ORAL DAILY
Qty: 90 CAPSULE | Refills: 3 | Status: SHIPPED | OUTPATIENT
Start: 2021-08-18 | End: 2022-05-10 | Stop reason: SDUPTHER

## 2021-08-18 RX ORDER — MONTELUKAST SODIUM 10 MG/1
10 TABLET ORAL NIGHTLY
Qty: 90 TABLET | Refills: 3 | Status: SHIPPED | OUTPATIENT
Start: 2021-08-18 | End: 2022-05-10 | Stop reason: SDUPTHER

## 2021-08-18 NOTE — PROGRESS NOTES
Procedure     ECG 12 Lead    Date/Time: 8/18/2021 1:00 PM  Performed by: Julienne Dupree APRN  Authorized by: Julienne Dupree APRN   Comparison: compared with previous ECG from 12/8/2016  Similar to previous ECG  Rhythm: sinus rhythm  Rate: normal  BPM: 79  Conduction: conduction normal  Conduction comments: ID [id number] QRS 0.12  ST Segments: ST segments normal  T Waves: T waves normal  QRS axis: normal    Clinical impression: normal ECG

## 2021-08-18 NOTE — PROGRESS NOTES
QUICK REFERENCE INFORMATION:  The ABCs of Providing the Annual Wellness Visit   CMS.[facility]    Medicare Annual Wellness Visit      Subjective   History of Present Illness    Renae Schmitz is a 70 y.o. female who presents for an Annual Wellness Visit. In addition, we addressed the following health issues:    She will also need a separate Follow up on Hyperlipidemia, Depression with anxiety, OA of both hips, breast cancer, and GERD. She is on Crestor for hyperlipidemia. She tolerates this well.     She is on pristq 100 mg daily for depression. See PhQ-9.     She is on Omeprazle for GERD. She denies abd pain, indigestion or cough.     She has history of Osteopenia for which she takes Vitamin D. She had her last DXA [id number] showing osteopenia left hip.      PMH, PSH, SocHx, FamHx, Allergies, and Medications: Reviewed and updated in the Visit Navigator.     Outpatient Medications Prior to Visit   Medication Sig Dispense Refill   • desvenlafaxine (PRISTIQ) 100 MG 24 hr tablet Take 1 tablet by mouth Daily. 30 tablet 0   • ketoconazole (NIZORAL) 2 % shampoo SHAMPOO SCALP TWICE WEEKLY  1   • Loratadine (CLARITIN) 10 MG capsule Take 10 mg by mouth Daily. 90 each 0   • meloxicam (MOBIC) 7.5 MG tablet TAKE ONE TABLET BY MOUTH DAILY 90 tablet 2   • montelukast (SINGULAIR) 10 MG tablet Take 1 tablet by mouth Every Night. 90 tablet 3   • omeprazole (priLOSEC) 40 MG capsule Take 1 capsule by mouth Daily. 90 capsule 3   • rosuvastatin (CRESTOR) 20 MG tablet Take 1 tablet by mouth Daily. 90 tablet 1   • tamoxifen (NOLVADEX) 20 MG chemo tablet Take 1 tablet by mouth Daily. 90 tablet 0   • vitamin B-12 (CYANOCOBALAMIN) 1000 MCG tablet Take 1 tablet by mouth Daily. 90 tablet 3   • vitamin D (ERGOCALCIFEROL) 1.25 MG (72740 UT) capsule capsule TAKE ONE CAPSULE BY MOUTH ONCE TIME PER WEEK 12 capsule 2   • mometasone (ELOCON) 0.1 % lotion APPLY TO SCALP EVERY DAY AS NEEDED FOR ITCHING  1     No facility-administered  medications prior to visit.       Patient Active Problem List   Diagnosis   • Malignant neoplasm of breast in female, estrogen receptor positive (CMS/HCC)   • Major depressive disorder   • Mitral regurgitation   • Hyperlipidemia LDL goal <100   • Mild sleep apnea   • Osteopenia   • Vitamin D deficiency   • Vitamin B 12 deficiency   • Benign thyroid cyst   • Depression with anxiety   • Osteoarthritis of both hips   • Gastroesophageal reflux disease without esophagitis   • Environmental allergies   • Long-term current use of tamoxifen       Health Habits:  Dental Exam. up to date  Eye Exam. up to date  Exercise: 0 times/week.  Current exercise activities include: none    Social:  See review in SnapShot activity and in SocHx section of Visit Navigator.    Health Risk Assessment:  The patient has completed a Health Risk Assessment. This has been reviewed with them and has been scanned into Media Manager as a separate document.    Current Medical Providers:  Patient Care Team:  Julienne Dupree APRN as PCP - General (Family Medicine)  Jaime Puente MD as Consulting Physician (Cardiology)  Aaron Correa MD as Consulting Physician (Hematology and Oncology)  Aylin Parra APRN as Consulting Physician (Obstetrics and Gynecology)  Matt Troncoso MD as Referring Physician (General Surgery)  Aylin Parra APRN as Referring Physician (Obstetrics and Gynecology)    The [facility] providers who are involved in the care of this patient are listed above. Additional providers and suppliers are listed below:  Dr. Ceja    Recent Hospitalizations:  No hospitalization(s) within the last year..    Age-appropriate Screening Schedule:  Refer to the list below for future screening recommendations based on patient's age. Orders for these recommended tests are listed in the plan section. The patient has been provided with a written plan.    Health Maintenance   Topic Date Due   • HEPATITIS C SCREENING  Never done   •  ANNUAL WELLNESS VISIT  07/21/2021   • INFLUENZA VACCINE  10/01/2021   • LIPID PANEL  03/09/2022   • MAMMOGRAM  05/20/2022   • DXA SCAN  05/20/2023   • TDAP/TD VACCINES (2 - Td or Tdap) 07/01/2023   • COLORECTAL CANCER SCREENING  10/10/2028   • COVID-19 Vaccine  Completed   • Pneumococcal Vaccine 65+  Completed       Depression Screen:   PHQ-2/PHQ-9 Depression Screening 8/18/2021   Little interest or pleasure in doing things 0   Feeling down, depressed, or hopeless 0   Trouble falling or staying asleep, or sleeping too much -   Feeling tired or having little energy -   Poor appetite or overeating -   Feeling bad about yourself - or that you are a failure or have let yourself or your family down -   Trouble concentrating on things, such as reading the newspaper or watching television -   Moving or speaking so slowly that other people could have noticed. Or the opposite - being so fidgety or restless that you have been moving around a lot more than usual -   Thoughts that you would be better off dead, or of hurting yourself in some way -   Total Score 0   If you checked off any problems, how difficult have these problems made it for you to do your work, take care of things at home, or get along with other people? -       Functional and Cognitive Screening:  Functional & Cognitive Status 8/18/2021   Do you have difficulty preparing food and eating? No   Do you have difficulty bathing yourself, getting dressed or grooming yourself? No   Do you have difficulty using the toilet? No   Do you have difficulty moving around from place to place? No   Do you have trouble with steps or getting out of a bed or a chair? No   Current Diet Well Balanced Diet   Dental Exam Up to date   Eye Exam Up to date   Exercise (times per week) 2 times per week   Current Exercises Include Yard Work   Current Exercise Activities Include -   Do you need help using the phone?  No   Are you deaf or do you have serious difficulty hearing?  No   Do you  "need help with transportation? Yes   Do you need help shopping? No   Do you need help preparing meals?  No   Do you need help with housework?  No   Do you need help with laundry? No   Do you need help taking your medications? No   Do you need help managing money? No   Do you ever drive or ride in a car without wearing a seat belt? No   Have you felt unusual stress, anger or loneliness in the last month? No   Who do you live with? Spouse   If you need help, do you have trouble finding someone available to you? No   Have you been bothered in the last four weeks by sexual problems? -   Do you have difficulty concentrating, remembering or making decisions? Yes       Does the patient have evidence of cognitive impairment? No    Advanced Care Planning:  ACP discussion was held with the patient during this visit. Patient does not have an advance directive, information provided.    Identification of Risk Factors:  Risk factors include: Advance Directive Discussion  Breast Cancer/Mammogram Screening  Cardiovascular risk  Diabetic Lab Screening   Inactivity/Sedentary  Obesity/Overweight   Osteoporosis Risk.    Review of Systems   Eyes: Negative.  Negative for visual disturbance.   Respiratory: Negative.    Cardiovascular: Negative.    Gastrointestinal: Negative.    Endocrine: Negative.    Genitourinary: Negative.    Musculoskeletal: Positive for arthralgias.   Allergic/Immunologic: Negative.    Neurological: Negative.    Hematological: Negative.          Objective     Vitals:    08/18/21 1243   BP: 130/82   Pulse: 88   Temp: 97.3 °F (36.3 °C)   SpO2: 97%   Weight: 99.4 kg (219 lb 3.2 oz)   Height: 170.2 cm (67.01\")   PainSc: 0-No pain     /82   Pulse 88   Temp 97.3 °F (36.3 °C)   Ht 170.2 cm (67.01\")   Wt 99.4 kg (219 lb 3.2 oz)   SpO2 97%   BMI 34.32 kg/m²   General appearance: alert, appears stated age and cooperative  Head: Normocephalic, without obvious abnormality, atraumatic  Eyes: conjunctivae/corneas " clear. PERRL, EOM's intact. Fundi benign.  Ears: normal TM's and external ear canals both ears  Nose: Nares normal. Septum midline. Mucosa normal. No drainage or sinus tenderness.  Throat: lips, mucosa, and tongue normal; teeth and gums normal  Neck: no adenopathy, no carotid bruit, no JVD, supple, symmetrical, trachea midline and thyroid not enlarged, symmetric, no tenderness/mass/nodules  Back: symmetric, no curvature. ROM normal. No CVA tenderness.  Lungs: clear to auscultation bilaterally  Heart: regular rate and rhythm, S1, S2 normal, no murmur, click, rub or gallop  Abdomen: soft, non-tender; bowel sounds normal; no masses,  no organomegaly    Extremities: extremities normal, atraumatic, no cyanosis or edema and Slow gait  Pulses: 2+ and symmetric    Body mass index is 34.32 kg/m².    Assessment/Plan   Patient Self-Management and Personalized Health Advice  The patient has been provided with information about: diet, exercise, weight management, prevention of cardiac or vascular disease, the relationship between weight and GERD, fall prevention, designing advance directives and mental health concerns and preventive services including:   · Annual Wellness Visit (AWV)  · Bone Density Measurements  · Colorectal Cancer Screening, Cologuard Test   · Depression Screening (15 minutes face to face, Code )  · Diabetes Self-Management Training (DSMT) .    Discussed the patient's BMI with her. The BMI is above average; BMI management plan is completed.    Orders:       Diagnosis Plan   1. Encounter for annual wellness visit (AWV) in Medicare patient     2. Depression with anxiety  ECG 12 Lead    CBC (No Diff)    Comprehensive Metabolic Panel   3. Hyperlipidemia LDL goal <100  ECG 12 Lead    Lipid Panel With / Chol / HDL Ratio    CBC (No Diff)    Comprehensive Metabolic Panel    Hepatitis C Antibody    Vitamin D 25 Hydroxy   4. Primary osteoarthritis of both hips     5. Gastroesophageal reflux disease without  esophagitis  omeprazole (priLOSEC) 40 MG capsule    CBC (No Diff)    Comprehensive Metabolic Panel   6. Osteopenia of left hip  Vitamin D 25 Hydroxy   7. Environmental allergies  montelukast (SINGULAIR) 10 MG tablet   8. Need for hepatitis C screening test  Hepatitis C Antibody     Depression is well controled on Pristiq 100mg daily    LDL at goal on Crestor    GERD is well controlled on Omeprazole.     Follow Up:     6 months with labs    An After Visit Summary and PPPS with all of these plans were given to the patient.

## 2021-08-19 LAB
25(OH)D3+25(OH)D2 SERPL-MCNC: 42.7 NG/ML (ref 30–100)
ALBUMIN SERPL-MCNC: 4.2 G/DL (ref 3.5–5.2)
ALBUMIN/GLOB SERPL: 1.7 G/DL
ALP SERPL-CCNC: 63 U/L (ref 39–117)
ALT SERPL-CCNC: 12 U/L (ref 1–33)
AST SERPL-CCNC: 20 U/L (ref 1–32)
BILIRUB SERPL-MCNC: 0.4 MG/DL (ref 0–1.2)
BUN SERPL-MCNC: 21 MG/DL (ref 8–23)
BUN/CREAT SERPL: 32.3 (ref 7–25)
CALCIUM SERPL-MCNC: 9.4 MG/DL (ref 8.6–10.5)
CHLORIDE SERPL-SCNC: 106 MMOL/L (ref 98–107)
CHOLEST SERPL-MCNC: 151 MG/DL (ref 0–200)
CHOLEST/HDLC SERPL: 2.22 {RATIO}
CO2 SERPL-SCNC: 23.8 MMOL/L (ref 22–29)
CREAT SERPL-MCNC: 0.65 MG/DL (ref 0.57–1)
ERYTHROCYTE [DISTWIDTH] IN BLOOD BY AUTOMATED COUNT: 12.3 % (ref 12.3–15.4)
GLOBULIN SER CALC-MCNC: 2.5 GM/DL
GLUCOSE SERPL-MCNC: 84 MG/DL (ref 65–99)
HCT VFR BLD AUTO: 38.9 % (ref 34–46.6)
HCV AB S/CO SERPL IA: <0.1 S/CO RATIO (ref 0–0.9)
HDLC SERPL-MCNC: 68 MG/DL (ref 40–60)
HGB BLD-MCNC: 12.4 G/DL (ref 12–15.9)
LDLC SERPL CALC-MCNC: 67 MG/DL (ref 0–100)
MCH RBC QN AUTO: 30.5 PG (ref 26.6–33)
MCHC RBC AUTO-ENTMCNC: 31.9 G/DL (ref 31.5–35.7)
MCV RBC AUTO: 95.6 FL (ref 79–97)
PLATELET # BLD AUTO: 242 10*3/MM3 (ref 140–450)
POTASSIUM SERPL-SCNC: 4.6 MMOL/L (ref 3.5–5.2)
PROT SERPL-MCNC: 6.7 G/DL (ref 6–8.5)
RBC # BLD AUTO: 4.07 10*6/MM3 (ref 3.77–5.28)
SODIUM SERPL-SCNC: 139 MMOL/L (ref 136–145)
TRIGL SERPL-MCNC: 82 MG/DL (ref 0–150)
VLDLC SERPL CALC-MCNC: 16 MG/DL (ref 5–40)
WBC # BLD AUTO: 6.59 10*3/MM3 (ref 3.4–10.8)

## 2021-08-30 DIAGNOSIS — E78.5 HYPERLIPIDEMIA LDL GOAL <100: ICD-10-CM

## 2021-08-30 RX ORDER — ROSUVASTATIN CALCIUM 20 MG/1
TABLET, COATED ORAL
Qty: 90 TABLET | Refills: 1 | Status: SHIPPED | OUTPATIENT
Start: 2021-08-30 | End: 2022-02-21

## 2021-08-30 RX ORDER — TAMOXIFEN CITRATE 20 MG/1
TABLET ORAL
Qty: 90 TABLET | Refills: 3 | Status: SHIPPED | OUTPATIENT
Start: 2021-08-30 | End: 2022-08-22

## 2021-08-30 NOTE — TELEPHONE ENCOUNTER
Tamoxifen refill request rec electronically from [facility]. Per last office note from Dr Correa-pt is to continue.     Continue  Tamoxifen 20 mg daily     I have route the rx to Dr Correa for signature. Once signed it will be escribed to [facility].

## 2021-09-27 RX ORDER — MELOXICAM 7.5 MG/1
TABLET ORAL
Qty: 90 TABLET | Refills: 2 | OUTPATIENT
Start: 2021-09-27

## 2021-10-11 RX ORDER — MELOXICAM 7.5 MG/1
TABLET ORAL
Qty: 90 TABLET | Refills: 2 | OUTPATIENT
Start: 2021-10-11

## 2021-10-11 NOTE — TELEPHONE ENCOUNTER
Leandro Watkins MD  to Me        9/27/21 3:41 PM  Note  Tell patient think she is under Dr. Leos's care if he should contact him for medication refills        Spoke with patient she states that the RX refill should be going to [facility].    Thanks.

## 2021-10-15 NOTE — TELEPHONE ENCOUNTER
Caller: Renae Schmitz    Relationship: Self      Medication requested (name and dosage):    meloxicam (MOBIC) 7.5 MG tablet         Pharmacy where request should be sent: [facility] - [place] [address] - [phone] [facility] [phone]      Additional details provided by patient: PATIENT STATES THAT DOCTOR THAT WAS PRESCRIBING THIS MEDICATION IS RETIRING AND SHE SAY ROBERTO SAID SHE WOULD TAKE OVER FOR HER.    Best call back number: [phone]     Does the patient have less than a 3 day supply:  [] Yes  [x] No    Cora Moeller, PCT   10/15/21 13:07 EDT

## 2021-10-19 RX ORDER — MELOXICAM 7.5 MG/1
7.5 TABLET ORAL DAILY
Qty: 90 TABLET | Refills: 2 | Status: SHIPPED | OUTPATIENT
Start: 2021-10-19 | End: 2022-05-10 | Stop reason: SDUPTHER

## 2021-10-19 NOTE — TELEPHONE ENCOUNTER
PATIENT CALLED AGAIN, CHECKING ON STATUS OF REFILLS OF MELOXICAM 7.5MG    [facility] - [place], [place] - [address] - [phone] [facility] [phone] FX

## 2021-11-30 ENCOUNTER — TRANSCRIBE ORDERS (OUTPATIENT)
Dept: ONCOLOGY | Facility: CLINIC | Age: 71
End: 2021-11-30

## 2021-11-30 ENCOUNTER — LAB (OUTPATIENT)
Dept: LAB | Facility: HOSPITAL | Age: 71
End: 2021-11-30

## 2021-11-30 ENCOUNTER — OFFICE VISIT (OUTPATIENT)
Dept: ONCOLOGY | Facility: CLINIC | Age: 71
End: 2021-11-30

## 2021-11-30 VITALS
WEIGHT: 221 LBS | HEART RATE: 76 BPM | HEIGHT: 66 IN | BODY MASS INDEX: 35.52 KG/M2 | DIASTOLIC BLOOD PRESSURE: 82 MMHG | OXYGEN SATURATION: 98 % | SYSTOLIC BLOOD PRESSURE: 143 MMHG | RESPIRATION RATE: 14 BRPM | TEMPERATURE: 98.2 F

## 2021-11-30 DIAGNOSIS — Z17.0 MALIGNANT NEOPLASM OF AREOLA OF RIGHT BREAST IN FEMALE, ESTROGEN RECEPTOR POSITIVE (HCC): Primary | ICD-10-CM

## 2021-11-30 DIAGNOSIS — F41.8 DEPRESSION WITH ANXIETY: ICD-10-CM

## 2021-11-30 DIAGNOSIS — Z12.31 ENCOUNTER FOR SCREENING MAMMOGRAM FOR MALIGNANT NEOPLASM OF BREAST: ICD-10-CM

## 2021-11-30 DIAGNOSIS — Z17.0 MALIGNANT NEOPLASM OF AREOLA OF RIGHT BREAST IN FEMALE, ESTROGEN RECEPTOR POSITIVE (HCC): ICD-10-CM

## 2021-11-30 DIAGNOSIS — M85.852 OSTEOPENIA OF LEFT HIP: ICD-10-CM

## 2021-11-30 DIAGNOSIS — C50.011 MALIGNANT NEOPLASM OF AREOLA OF RIGHT BREAST IN FEMALE, ESTROGEN RECEPTOR POSITIVE (HCC): Primary | ICD-10-CM

## 2021-11-30 DIAGNOSIS — Z12.31 VISIT FOR SCREENING MAMMOGRAM: Primary | ICD-10-CM

## 2021-11-30 DIAGNOSIS — Z79.810 LONG-TERM CURRENT USE OF TAMOXIFEN: ICD-10-CM

## 2021-11-30 DIAGNOSIS — C50.011 MALIGNANT NEOPLASM OF AREOLA OF RIGHT BREAST IN FEMALE, ESTROGEN RECEPTOR POSITIVE (HCC): ICD-10-CM

## 2021-11-30 LAB
ALBUMIN SERPL-MCNC: 4.1 G/DL (ref 3.5–5.2)
ALBUMIN/GLOB SERPL: 1.6 G/DL
ALP SERPL-CCNC: 66 U/L (ref 39–117)
ALT SERPL W P-5'-P-CCNC: 13 U/L (ref 1–33)
ANION GAP SERPL CALCULATED.3IONS-SCNC: 7.3 MMOL/L (ref 5–15)
AST SERPL-CCNC: 17 U/L (ref 1–32)
BASOPHILS # BLD AUTO: 0.01 10*3/MM3 (ref 0–0.2)
BASOPHILS NFR BLD AUTO: 0.2 % (ref 0–1.5)
BILIRUB SERPL-MCNC: 0.3 MG/DL (ref 0–1.2)
BUN SERPL-MCNC: 15 MG/DL (ref 8–23)
BUN/CREAT SERPL: 20.5 (ref 7–25)
CALCIUM SPEC-SCNC: 8.7 MG/DL (ref 8.6–10.5)
CHLORIDE SERPL-SCNC: 105 MMOL/L (ref 98–107)
CO2 SERPL-SCNC: 23.7 MMOL/L (ref 22–29)
CREAT SERPL-MCNC: 0.73 MG/DL (ref 0.57–1)
DEPRECATED RDW RBC AUTO: 45.5 FL (ref 37–54)
EOSINOPHIL # BLD AUTO: 0.14 10*3/MM3 (ref 0–0.4)
EOSINOPHIL NFR BLD AUTO: 2.9 % (ref 0.3–6.2)
ERYTHROCYTE [DISTWIDTH] IN BLOOD BY AUTOMATED COUNT: 13.2 % (ref 12.3–15.4)
GFR SERPL CREATININE-BSD FRML MDRD: 78 ML/MIN/1.73
GLOBULIN UR ELPH-MCNC: 2.6 GM/DL
GLUCOSE SERPL-MCNC: 91 MG/DL (ref 65–99)
HCT VFR BLD AUTO: 37.7 % (ref 34–46.6)
HGB BLD-MCNC: 12 G/DL (ref 12–15.9)
IMM GRANULOCYTES # BLD AUTO: 0.01 10*3/MM3 (ref 0–0.05)
IMM GRANULOCYTES NFR BLD AUTO: 0.2 % (ref 0–0.5)
LYMPHOCYTES # BLD AUTO: 2.02 10*3/MM3 (ref 0.7–3.1)
LYMPHOCYTES NFR BLD AUTO: 41.3 % (ref 19.6–45.3)
MCH RBC QN AUTO: 29.6 PG (ref 26.6–33)
MCHC RBC AUTO-ENTMCNC: 31.8 G/DL (ref 31.5–35.7)
MCV RBC AUTO: 93.1 FL (ref 79–97)
MONOCYTES # BLD AUTO: 0.39 10*3/MM3 (ref 0.1–0.9)
MONOCYTES NFR BLD AUTO: 8 % (ref 5–12)
NEUTROPHILS NFR BLD AUTO: 2.32 10*3/MM3 (ref 1.7–7)
NEUTROPHILS NFR BLD AUTO: 47.4 % (ref 42.7–76)
PLATELET # BLD AUTO: 230 10*3/MM3 (ref 140–450)
PMV BLD AUTO: 9.4 FL (ref 6–12)
POTASSIUM SERPL-SCNC: 4.2 MMOL/L (ref 3.5–5.2)
PROT SERPL-MCNC: 6.7 G/DL (ref 6–8.5)
RBC # BLD AUTO: 4.05 10*6/MM3 (ref 3.77–5.28)
SODIUM SERPL-SCNC: 136 MMOL/L (ref 136–145)
WBC NRBC COR # BLD: 4.89 10*3/MM3 (ref 3.4–10.8)

## 2021-11-30 PROCEDURE — 80053 COMPREHEN METABOLIC PANEL: CPT

## 2021-11-30 PROCEDURE — 85025 COMPLETE CBC W/AUTO DIFF WBC: CPT

## 2021-11-30 PROCEDURE — 99214 OFFICE O/P EST MOD 30 MIN: CPT | Performed by: INTERNAL MEDICINE

## 2021-11-30 PROCEDURE — 36415 COLL VENOUS BLD VENIPUNCTURE: CPT

## 2021-11-30 NOTE — PROGRESS NOTES
Subjective   REASON FOR FOLLOWUP:    1.Right breast cancer T1c N0 ER/DE positive HER-2 negative, Oncotype score of 20  2.  Osteopenia                               REQUESTING PHYSICIAN:  Trino Troncoso M.D.        History of Present Illness patient is a 71 a lady with a history of right breast cancer [id number] ER/DE positive HER-2 negative here for follow-up over 4.5 years from diagnosis.  Adjuvantly she received radiation and was initially on AI therapy which was subsequently switched to tamoxifen secondary to decreasing bone density.    She is tolerating tamoxifen with mild vasomotor symptoms.  She has chronic right breast discomfort since her previous surgery.  She has noticed no changes on the self breast examination.  She denies unusual sites of pain, cough or shortness of breath.  She reports her blood pressure has been running a little high at home systolics 140s or so.    Past Medical History:   Diagnosis Date   • Anemia     prior to hysterectomy several years ago   • Anxiety    • Benign thyroid cyst 1971   • Breast CA (HCC) 12/01/2016    Right lumpectomy   • Depression    • Fibromyalgia    • Fracture of wrist    • H/O mammogram 12/04/2018   • Heart murmur    • History of ankle fracture     Right   • History of Papanicolaou smear of cervix 2016    Dr. Johnson   • Hypercholesterolemia    • Hyperlipidemia    • Mitral valve regurgitation    • Mood swings    • MARSHALL on CPAP 1/22/2019   • PONV (postoperative nausea and vomiting)    • Sleep apnea         Past Surgical History:   Procedure Laterality Date   • BREAST BIOPSY     • BREAST LUMPECTOMY WITH SENTINEL NODE BIOPSY AND AXILLARY NODE DISSECTION Right 12/13/2016    Procedure: RT BREAST LUMPECTOMY WITH SENTINEL NODE BIOPSY & MAMMO NEEDLE LOC;  Surgeon: Matt Troncoso MD;  [place] [facility] [place] [facility] [place]  Service:    • COLONOSCOPY N/A 10/10/2018    Procedure: COLONOSCOPY TO CECUM AND TERMINAL ILEUM WITH COLD BIOPSIES;  Surgeon: Carl Salas MD;  Location: [facility]  "ENDOSCOPY;  Service: Gastroenterology   • HAND SURGERY Left 2003    2 FINGERS PINNED   • HYSTERECTOMY  1995    Complete Sept 1995   • PERCUTANEOUS PINNING FINGER FRACTURE     • THYROID CYST EXCISION  1971   • TUBAL ABDOMINAL LIGATION         Current Outpatient Medications on File Prior to Visit   Medication Sig Dispense Refill   • desvenlafaxine (PRISTIQ) 100 MG 24 hr tablet Take 1 tablet by mouth Daily. 30 tablet 0   • ketoconazole (NIZORAL) 2 % shampoo SHAMPOO SCALP TWICE WEEKLY  1   • Loratadine (CLARITIN) 10 MG capsule Take 10 mg by mouth Daily. 90 each 0   • meloxicam (MOBIC) 7.5 MG tablet Take 1 tablet by mouth Daily. 90 tablet 2   • mometasone (ELOCON) 0.1 % lotion APPLY TO SCALP EVERY DAY AS NEEDED FOR ITCHING  1   • montelukast (SINGULAIR) 10 MG tablet Take 1 tablet by mouth Every Night. 90 tablet 3   • omeprazole (priLOSEC) 40 MG capsule Take 1 capsule by mouth Daily. 90 capsule 3   • rosuvastatin (CRESTOR) 20 MG tablet TAKE ONE TABLET BY MOUTH DAILY 90 tablet 1   • tamoxifen (NOLVADEX) 20 MG chemo tablet TAKE ONE TABLET BY MOUTH DAILY 90 tablet 3   • vitamin B-12 (CYANOCOBALAMIN) 1000 MCG tablet Take 1 tablet by mouth Daily. 90 tablet 3   • vitamin D (ERGOCALCIFEROL) 1.25 MG (14771 UT) capsule capsule TAKE ONE CAPSULE BY MOUTH ONCE TIME PER WEEK 12 capsule 2     No current facility-administered medications on file prior to visit.        ALLERGIES:    Allergies   Allergen Reactions   • Tegaderm Ag Mesh [Silver] Other (See Comments)     SKIN BLISTERS  SKIN BLISTERS   • Covid-19 (Mrna) Vaccine Dizziness   • Shingrix [Zoster Vac Recomb Adjuvanted] Other (See Comments)     \"ice water sensation in legs\"   • Sulfa Antibiotics Rash   • Zithromax [Azithromycin] Rash        Social History     Socioeconomic History   • Marital status:      Spouse name: Peter   • Years of education: High school   Tobacco Use   • Smoking status: Never Smoker   • Smokeless tobacco: Never Used   Substance and Sexual Activity   • " "Alcohol use: No   • Drug use: No   • Sexual activity: Yes     Partners: Male     Birth control/protection: Surgical        Family History   Problem Relation Age of Onset   • Heart failure Mother    • Colon cancer Father    • Liver cancer Father    • Tuberculosis Father    • Breast cancer Sister 58        DCIS   • Depression Daughter       Father  at 65 of metastatic colon cancer his parents lived to their 80s without cancer mother  at 93 of CHF and had only one brother who had no cancer.  She has only one sister who had breast cancer at age 58 and again at 63 no ovarian cancer in the family  Review of Systems   Constitutional: Negative for activity change, appetite change, chills, fever and unexpected weight change.   Eyes: Positive for visual disturbance (cataract).   Respiratory: Negative for cough, chest tightness and shortness of breath.    Cardiovascular: Negative for chest pain and leg swelling.   Gastrointestinal: Negative for constipation, diarrhea, nausea and vomiting.   Genitourinary: Negative for difficulty urinating.   Musculoskeletal: Negative for back pain, joint swelling and neck pain.        TMJ   Neurological: Negative for dizziness and headaches.   Psychiatric/Behavioral: Negative for dysphoric mood.   All other systems reviewed and are negative.  Giorgi Ceja MD        Objective     Vitals:    21 1230   BP: 143/82   Pulse: 76   Resp: 14   Temp: 98.2 °F (36.8 °C)   TempSrc: Infrared   SpO2: 98%   Weight: 100 kg (221 lb)   Height: 168 cm (66.14\")  Comment: new ht   PainSc: 0-No pain      Current Status 2021   ECOG score 0       Physical Exam    CONSTITUTIONAL: pleasant well-developed adult woman  HEENT: no icterus, no thrush, moist membranes  NECK: no jvd  LYMPH: no cervical or supraclavicular lad  CV: RRR, S1S2, no murmur  RESP: cta bilat, no wheezing, no rales  Breast: There is a lumpectomy scar in the right breast well-healed.  No obvious masses in either breast.  No " axillary or supraclavicular lymphadenopathy.  GI: soft, non-tender, no splenomegaly, +bs  MUSC: no edema, normal gait  NEURO: alert and oriented x3, normal strength  PSYCH: normal mood and affect        RECENT LABS:  Hematology WBC   Date Value Ref Range Status   11/30/2021 4.89 3.40 - 10.80 10*3/mm3 Final   08/18/2021 6.59 3.40 - 10.80 10*3/mm3 Final     RBC   Date Value Ref Range Status   11/30/2021 4.05 3.77 - 5.28 10*6/mm3 Final   08/18/2021 4.07 3.77 - 5.28 10*6/mm3 Final     Hemoglobin   Date Value Ref Range Status   11/30/2021 12.0 12.0 - 15.9 g/dL Final     Hematocrit   Date Value Ref Range Status   11/30/2021 37.7 34.0 - 46.6 % Final     Platelets   Date Value Ref Range Status   11/30/2021 230 140 - 450 10*3/mm3 Final          Assessment/Plan   1.  T1 CN 0M0 ER/NH positive HER-2 negative right-sided breast cancer  ·  oncotype score of 20 -Arimidex plus radiation planned started in 3/17  · Worsening bone density in 3/19 switch to tamoxifen in 8/19  · Total 7 years adjuvant hormonal therapy planned      2.  Osteopenia  · Most recent bone density 5/20/2021 showed osteopenia with left hip T score -2.2, L-spine -1.2    3.  Depression/anxiety on Pristiq per her PCP    Plan  1.  Continue  Tamoxifen 20 mg daily   2.  Bilateral screening mammography scheduled after 5/20/2022  3.  Consider repeat bone density after 5/20/2023  4.  6-month follow-up CBC CMP ordered

## 2022-01-17 ENCOUNTER — OFFICE VISIT (OUTPATIENT)
Dept: INTERNAL MEDICINE | Facility: CLINIC | Age: 72
End: 2022-01-17

## 2022-01-17 VITALS
BODY MASS INDEX: 35.57 KG/M2 | WEIGHT: 221.3 LBS | OXYGEN SATURATION: 99 % | SYSTOLIC BLOOD PRESSURE: 152 MMHG | HEART RATE: 81 BPM | DIASTOLIC BLOOD PRESSURE: 88 MMHG | RESPIRATION RATE: 18 BRPM | TEMPERATURE: 98.4 F | HEIGHT: 66 IN

## 2022-01-17 DIAGNOSIS — R39.89 SENSATION OF PRESSURE IN BLADDER AREA: ICD-10-CM

## 2022-01-17 DIAGNOSIS — I10 PRIMARY HYPERTENSION: Primary | ICD-10-CM

## 2022-01-17 DIAGNOSIS — N30.00 ACUTE CYSTITIS WITHOUT HEMATURIA: ICD-10-CM

## 2022-01-17 LAB
BILIRUB BLD-MCNC: NEGATIVE MG/DL
CLARITY, POC: CLEAR
COLOR UR: ABNORMAL
EXPIRATION DATE: ABNORMAL
GLUCOSE UR STRIP-MCNC: NEGATIVE MG/DL
KETONES UR QL: NEGATIVE
LEUKOCYTE EST, POC: ABNORMAL
Lab: ABNORMAL
NITRITE UR-MCNC: NEGATIVE MG/ML
PH UR: 5 [PH] (ref 5–8)
PROT UR STRIP-MCNC: ABNORMAL MG/DL
RBC # UR STRIP: ABNORMAL /UL
SP GR UR: 1.03 (ref 1–1.03)
UROBILINOGEN UR QL: NORMAL

## 2022-01-17 PROCEDURE — 81003 URINALYSIS AUTO W/O SCOPE: CPT | Performed by: NURSE PRACTITIONER

## 2022-01-17 PROCEDURE — 99214 OFFICE O/P EST MOD 30 MIN: CPT | Performed by: NURSE PRACTITIONER

## 2022-01-17 RX ORDER — BISOPROLOL FUMARATE AND HYDROCHLOROTHIAZIDE 2.5; 6.25 MG/1; MG/1
1 TABLET ORAL DAILY
Qty: 30 TABLET | Refills: 1 | Status: SHIPPED | OUTPATIENT
Start: 2022-01-17 | End: 2022-02-14 | Stop reason: SDUPTHER

## 2022-01-17 RX ORDER — CIPROFLOXACIN 250 MG/1
250 TABLET, FILM COATED ORAL 2 TIMES DAILY
Qty: 6 TABLET | Refills: 0 | Status: SHIPPED | OUTPATIENT
Start: 2022-01-17 | End: 2022-02-14

## 2022-01-17 NOTE — PATIENT INSTRUCTIONS
"Low-Sodium Eating Plan  Sodium, which is an element that makes up salt, helps you maintain a healthy balance of fluids in your body. Too much sodium can increase your blood pressure and cause fluid and waste to be held in your body.  Your health care provider or dietitian may recommend following this plan if you have high blood pressure (hypertension), kidney disease, liver disease, or heart failure. Eating less sodium can help lower your blood pressure, reduce swelling, and protect your heart, liver, and kidneys.  What are tips for following this plan?  Reading food labels  · The Nutrition Facts label lists the amount of sodium in one serving of the food. If you eat more than one serving, you must multiply the listed amount of sodium by the number of servings.  · Choose foods with less than 140 mg of sodium per serving.  · Avoid foods with 300 mg of sodium or more per serving.  Shopping    · Look for lower-sodium products, often labeled as \"low-sodium\" or \"no salt added.\"  · Always check the sodium content, even if foods are labeled as \"unsalted\" or \"no salt added.\"  · Buy fresh foods.  ? Avoid canned foods and pre-made or frozen meals.  ? Avoid canned, cured, or processed meats.  · Buy breads that have less than 80 mg of sodium per slice.    Cooking    · Eat more home-cooked food and less restaurant, buffet, and fast food.  · Avoid adding salt when cooking. Use salt-free seasonings or herbs instead of table salt or sea salt. Check with your health care provider or pharmacist before using salt substitutes.  · Cook with plant-based oils, such as canola, sunflower, or olive oil.    Meal planning  · When eating at a restaurant, ask that your food be prepared with less salt or no salt, if possible. Avoid dishes labeled as brined, pickled, cured, smoked, or made with soy sauce, miso, or teriyaki sauce.  · Avoid foods that contain MSG (monosodium glutamate). MSG is sometimes added to Chinese food, bouillon, and some " "canned foods.  · Make meals that can be grilled, baked, poached, roasted, or steamed. These are generally made with less sodium.  General information  Most people on this plan should limit their sodium intake to 1,500-2,000 mg (milligrams) of sodium each day.  What foods should I eat?  Fruits  Fresh, frozen, or canned fruit. Fruit juice.  Vegetables  Fresh or frozen vegetables. \"No salt added\" canned vegetables. \"No salt added\" tomato sauce and paste. Low-sodium or reduced-sodium tomato and vegetable juice.  Grains  Low-sodium cereals, including oats, puffed wheat and rice, and shredded wheat. Low-sodium crackers. Unsalted rice. Unsalted pasta. Low-sodium bread. Whole-grain breads and whole-grain pasta.  Meats and other proteins  Fresh or frozen (no salt added) meat, poultry, seafood, and fish. Low-sodium canned tuna and salmon. Unsalted nuts. Dried peas, beans, and lentils without added salt. Unsalted canned beans. Eggs. Unsalted nut butters.  Dairy  Milk. Soy milk. Cheese that is naturally low in sodium, such as ricotta cheese, fresh mozzarella, or Swiss cheese. Low-sodium or reduced-sodium cheese. Cream cheese. Yogurt.  Seasonings and condiments  Fresh and dried herbs and spices. Salt-free seasonings. Low-sodium mustard and ketchup. Sodium-free salad dressing. Sodium-free light mayonnaise. Fresh or refrigerated horseradish. Lemon juice. Vinegar.  Other foods  Homemade, reduced-sodium, or low-sodium soups. Unsalted popcorn and pretzels. Low-salt or salt-free chips.  The items listed above may not be a complete list of foods and beverages you can eat. Contact a dietitian for more information.  What foods should I avoid?  Vegetables  Sauerkraut, pickled vegetables, and relishes. Olives. French fries. Onion rings. Regular canned vegetables (not low-sodium or reduced-sodium). Regular canned tomato sauce and paste (not low-sodium or reduced-sodium). Regular tomato and vegetable juice (not low-sodium or reduced-sodium). " Frozen vegetables in sauces.  Grains  Instant hot cereals. Bread stuffing, pancake, and biscuit mixes. Croutons. Seasoned rice or pasta mixes. Noodle soup cups. Boxed or frozen macaroni and cheese. Regular salted crackers. Self-rising flour.  Meats and other proteins  Meat or fish that is salted, canned, smoked, spiced, or pickled. Precooked or cured meat, such as sausages or meat loaves. Webb. Ham. Pepperoni. Hot dogs. Corned beef. Chipped beef. Salt pork. Jerky. Pickled herring. Anchovies and sardines. Regular canned tuna. Salted nuts.  Dairy  Processed cheese and cheese spreads. Hard cheeses. Cheese curds. Blue cheese. Feta cheese. String cheese. Regular cottage cheese. Buttermilk. Canned milk.  Fats and oils  Salted butter. Regular margarine. Ghee. Webb fat.  Seasonings and condiments  Onion salt, garlic salt, seasoned salt, table salt, and sea salt. Canned and packaged gravies. Worcestershire sauce. Tartar sauce. Barbecue sauce. Teriyaki sauce. Soy sauce, including reduced-sodium. Steak sauce. Fish sauce. Oyster sauce. Cocktail sauce. Horseradish that you find on the shelf. Regular ketchup and mustard. Meat flavorings and tenderizers. Bouillon cubes. Hot sauce. Pre-made or packaged marinades. Pre-made or packaged taco seasonings. Relishes. Regular salad dressings. Salsa.  Other foods  Salted popcorn and pretzels. Corn chips and puffs. Potato and tortilla chips. Canned or dried soups. Pizza. Frozen entrees and pot pies.  The items listed above may not be a complete list of foods and beverages you should avoid. Contact a dietitian for more information.  Summary  · Eating less sodium can help lower your blood pressure, reduce swelling, and protect your heart, liver, and kidneys.  · Most people on this plan should limit their sodium intake to 1,500-2,000 mg (milligrams) of sodium each day.  · Canned, boxed, and frozen foods are high in sodium. Restaurant foods, fast foods, and pizza are also very high in sodium.  "You also get sodium by adding salt to food.  · Try to cook at home, eat more fresh fruits and vegetables, and eat less fast food and canned, processed, or prepared foods.  This information is not intended to replace advice given to you by your health care provider. Make sure you discuss any questions you have with your health care provider.  Document Revised: 01/22/2021 Document Reviewed: 11/18/2020  [employer] Patient Education © 2021 [employer]  https://www.nhlbi.nih.gov/files/docs/public/heart/dash_brief.pdf\">   DASH Eating Plan  DASH stands for Dietary Approaches to Stop Hypertension. The DASH eating plan is a healthy eating plan that has been shown to:  · Reduce high blood pressure (hypertension).  · Reduce your risk for type 2 diabetes, heart disease, and stroke.  · Help with weight loss.  What are tips for following this plan?  Reading food labels  · Check food labels for the amount of salt (sodium) per serving. Choose foods with less than 5 percent of the Daily Value of sodium. Generally, foods with less than 300 milligrams (mg) of sodium per serving fit into this eating plan.  · To find whole grains, look for the word \"whole\" as the first word in the ingredient list.  Shopping  · Buy products labeled as \"low-sodium\" or \"no salt added.\"  · Buy fresh foods. Avoid canned foods and pre-made or frozen meals.  Cooking  · Avoid adding salt when cooking. Use salt-free seasonings or herbs instead of table salt or sea salt. Check with your health care provider or pharmacist before using salt substitutes.  · Do not putnam foods. Cook foods using healthy methods such as baking, boiling, grilling, roasting, and broiling instead.  · Cook with heart-healthy oils, such as olive, canola, avocado, soybean, or sunflower oil.  Meal planning    · Eat a balanced diet that includes:  ? 4 or more servings of fruits and 4 or more servings of vegetables each day. Try to fill one-half of your plate with fruits and vegetables.  ? 6-8 " servings of whole grains each day.  ? Less than 6 oz (170 g) of lean meat, poultry, or fish each day. A 3-oz (85-g) serving of meat is about the same size as a deck of cards. One egg equals 1 oz (28 g).  ? 2-3 servings of low-fat dairy each day. One serving is 1 cup (237 mL).  ? 1 serving of nuts, seeds, or beans 5 times each week.  ? 2-3 servings of heart-healthy fats. Healthy fats called omega-3 fatty acids are found in foods such as walnuts, flaxseeds, fortified milks, and eggs. These fats are also found in cold-water fish, such as sardines, salmon, and mackerel.  · Limit how much you eat of:  ? Canned or prepackaged foods.  ? Food that is high in trans fat, such as some fried foods.  ? Food that is high in saturated fat, such as fatty meat.  ? Desserts and other sweets, sugary drinks, and other foods with added sugar.  ? Full-fat dairy products.  · Do not salt foods before eating.  · Do not eat more than 4 egg yolks a week.  · Try to eat at least 2 vegetarian meals a week.  · Eat more home-cooked food and less restaurant, buffet, and fast food.    Lifestyle  · When eating at a restaurant, ask that your food be prepared with less salt or no salt, if possible.  · If you drink alcohol:  ? Limit how much you use to:  § 0-1 drink a day for women who are not pregnant.  § 0-2 drinks a day for men.  ? Be aware of how much alcohol is in your drink. In the [place] one drink equals one 12 oz bottle of beer (355 mL), one 5 oz glass of wine (148 mL), or one 1½ oz glass of hard liquor (44 mL).  General information  · Avoid eating more than 2,300 mg of salt a day. If you have hypertension, you may need to reduce your sodium intake to 1,500 mg a day.  · Work with your health care provider to maintain a healthy body weight or to lose weight. Ask what an ideal weight is for you.  · Get at least 30 minutes of exercise that causes your heart to beat faster (aerobic exercise) most days of the week. Activities may include walking,  swimming, or biking.  · Work with your health care provider or dietitian to adjust your eating plan to your individual calorie needs.  What foods should I eat?  Fruits  All fresh, dried, or frozen fruit. Canned fruit in natural juice (without added sugar).  Vegetables  Fresh or frozen vegetables (raw, steamed, roasted, or grilled). Low-sodium or reduced-sodium tomato and vegetable juice. Low-sodium or reduced-sodium tomato sauce and tomato paste. Low-sodium or reduced-sodium canned vegetables.  Grains  Whole-grain or whole-wheat bread. Whole-grain or whole-wheat pasta. Brown rice. Oatmeal. Quinoa. Bulgur. Whole-grain and low-sodium cereals. Michell bread. Low-fat, low-sodium crackers. Whole-wheat flour tortillas.  Meats and other proteins  Skinless chicken or turkey. Ground chicken or turkey. Pork with fat trimmed off. Fish and seafood. Egg whites. Dried beans, peas, or lentils. Unsalted nuts, nut butters, and seeds. Unsalted canned beans. Lean cuts of beef with fat trimmed off. Low-sodium, lean precooked or cured meat, such as sausages or meat loaves.  Dairy  Low-fat (1%) or fat-free (skim) milk. Reduced-fat, low-fat, or fat-free cheeses. Nonfat, low-sodium ricotta or cottage cheese. Low-fat or nonfat yogurt. Low-fat, low-sodium cheese.  Fats and oils  Soft margarine without trans fats. Vegetable oil. Reduced-fat, low-fat, or light mayonnaise and salad dressings (reduced-sodium). Canola, safflower, olive, avocado, soybean, and sunflower oils. Avocado.  Seasonings and condiments  Herbs. Spices. Seasoning mixes without salt.  Other foods  Unsalted popcorn and pretzels. Fat-free sweets.  The items listed above may not be a complete list of foods and beverages you can eat. Contact a dietitian for more information.  What foods should I avoid?  Fruits  Canned fruit in a light or heavy syrup. Fried fruit. Fruit in cream or butter sauce.  Vegetables  Creamed or fried vegetables. Vegetables in a cheese sauce. Regular canned  vegetables (not low-sodium or reduced-sodium). Regular canned tomato sauce and paste (not low-sodium or reduced-sodium). Regular tomato and vegetable juice (not low-sodium or reduced-sodium). Pickles. Olives.  Grains  Baked goods made with fat, such as croissants, muffins, or some breads. Dry pasta or rice meal packs.  Meats and other proteins  Fatty cuts of meat. Ribs. Fried meat. Webb. Bologna, salami, and other precooked or cured meats, such as sausages or meat loaves. Fat from the back of a pig (fatback). Bratwurst. Salted nuts and seeds. Canned beans with added salt. Canned or smoked fish. Whole eggs or egg yolks. Chicken or turkey with skin.  Dairy  Whole or 2% milk, cream, and half-and-half. Whole or full-fat cream cheese. Whole-fat or sweetened yogurt. Full-fat cheese. Nondairy creamers. Whipped toppings. Processed cheese and cheese spreads.  Fats and oils  Butter. Stick margarine. Lard. Shortening. Ghee. Webb fat. Tropical oils, such as coconut, palm kernel, or palm oil.  Seasonings and condiments  Onion salt, garlic salt, seasoned salt, table salt, and sea salt. Worcestershire sauce. Tartar sauce. Barbecue sauce. Teriyaki sauce. Soy sauce, including reduced-sodium. Steak sauce. Canned and packaged gravies. Fish sauce. Oyster sauce. Cocktail sauce. Store-bought horseradish. Ketchup. Mustard. Meat flavorings and tenderizers. Bouillon cubes. Hot sauces. Pre-made or packaged marinades. Pre-made or packaged taco seasonings. Relishes. Regular salad dressings.  Other foods  Salted popcorn and pretzels.  The items listed above may not be a complete list of foods and beverages you should avoid. Contact a dietitian for more information.  Where to find more information  · National Heart, Lung, and Blood [place]: www.nhlbi.nih.gov  · American Heart Association: www.heart.org  · Academy of Nutrition and Dietetics: www.eatright.org  · National Kidney Foundation: www.kidney.org  Summary  · The DASH eating plan is a  healthy eating plan that has been shown to reduce high blood pressure (hypertension). It may also reduce your risk for type 2 diabetes, heart disease, and stroke.  · When on the DASH eating plan, aim to eat more fresh fruits and vegetables, whole grains, lean proteins, low-fat dairy, and heart-healthy fats.  · With the DASH eating plan, you should limit salt (sodium) intake to 2,300 mg a day. If you have hypertension, you may need to reduce your sodium intake to 1,500 mg a day.  · Work with your health care provider or dietitian to adjust your eating plan to your individual calorie needs.  This information is not intended to replace advice given to you by your health care provider. Make sure you discuss any questions you have with your health care provider.  Document Revised: 11/20/2020 Document Reviewed: 11/20/2020  [employer] Patient Education © 2021 [employer]

## 2022-01-17 NOTE — PROGRESS NOTES
"Chief Complaint   Patient presents with   • Hypertension   • Difficulty Urinating       Subjective     Renae Schmitz is a 71 y.o. female being seen for an acute visit for elevated BP readings and difficulty urinating. She reports sensation of \"heart pounding\" for the last 1-2 months. She started checking it at home. BP readings 127//100. She denies, irregular heart beats. Associated leg swelling.      She is reporting bladder pressure for 3-4 days. Associated urgency, frequency. Deneis fever,dysuria or hematuria. Precipitated by tub baths.        History of Present Illness     Allergies   Allergen Reactions   • Tegaderm Ag Mesh [Silver] Other (See Comments)     SKIN BLISTERS  SKIN BLISTERS   • Covid-19 (Mrna) Vaccine Dizziness   • Shingrix [Zoster Vac Recomb Adjuvanted] Other (See Comments)     \"ice water sensation in legs\"   • Sulfa Antibiotics Rash   • Zithromax [Azithromycin] Rash         Current Outpatient Medications:   •  desvenlafaxine (PRISTIQ) 100 MG 24 hr tablet, Take 1 tablet by mouth Daily., Disp: 30 tablet, Rfl: 0  •  ketoconazole (NIZORAL) 2 % shampoo, SHAMPOO SCALP TWICE WEEKLY, Disp: , Rfl: 1  •  Loratadine (CLARITIN) 10 MG capsule, Take 10 mg by mouth Daily., Disp: 90 each, Rfl: 0  •  meloxicam (MOBIC) 7.5 MG tablet, Take 1 tablet by mouth Daily., Disp: 90 tablet, Rfl: 2  •  mometasone (ELOCON) 0.1 % lotion, APPLY TO SCALP EVERY DAY AS NEEDED FOR ITCHING, Disp: , Rfl: 1  •  montelukast (SINGULAIR) 10 MG tablet, Take 1 tablet by mouth Every Night., Disp: 90 tablet, Rfl: 3  •  omeprazole (priLOSEC) 40 MG capsule, Take 1 capsule by mouth Daily., Disp: 90 capsule, Rfl: 3  •  rosuvastatin (CRESTOR) 20 MG tablet, TAKE ONE TABLET BY MOUTH DAILY, Disp: 90 tablet, Rfl: 1  •  tamoxifen (NOLVADEX) 20 MG chemo tablet, TAKE ONE TABLET BY MOUTH DAILY, Disp: 90 tablet, Rfl: 3  •  vitamin B-12 (CYANOCOBALAMIN) 1000 MCG tablet, Take 1 tablet by mouth Daily., Disp: 90 tablet, Rfl: 3  •  vitamin D " (ERGOCALCIFEROL) 1.25 MG (94041 UT) capsule capsule, TAKE ONE CAPSULE BY MOUTH ONCE TIME PER WEEK, Disp: 12 capsule, Rfl: 2    The following portions of the patient's history were reviewed and updated as appropriate: allergies, current medications, past family history, past medical history, past social history, past surgical history and problem list.    Review of Systems   Constitutional: Negative.    HENT: Negative.    Eyes: Negative.    Respiratory: Negative.  Negative for shortness of breath and stridor.    Cardiovascular: Positive for leg swelling. Negative for chest pain and palpitations.   Genitourinary: Positive for difficulty urinating, frequency and urgency. Negative for dysuria, genital sores, hematuria and pelvic pain.   Musculoskeletal: Positive for arthralgias.   Skin: Negative.    Neurological: Positive for dizziness.   Hematological: Negative.    Psychiatric/Behavioral: Negative.    All other systems reviewed and are negative.      Assessment     Physical Exam  Vitals reviewed.   Constitutional:       Appearance: Normal appearance. She is obese.   Cardiovascular:      Rate and Rhythm: Normal rate and regular rhythm.      Pulses: Normal pulses.      Heart sounds: Normal heart sounds. No murmur heard.      Pulmonary:      Effort: Pulmonary effort is normal. No respiratory distress.      Breath sounds: Normal breath sounds. No stridor. No rhonchi.   Abdominal:      Palpations: Abdomen is soft. There is no mass.      Tenderness: There is no abdominal tenderness.   Musculoskeletal:         General: Deformity: trace.      Right lower leg: Edema present.      Left lower leg: Edema (trace) present.   Skin:     General: Skin is warm and dry.   Neurological:      Mental Status: She is alert and oriented to person, place, and time.   Psychiatric:         Mood and Affect: Mood normal.         Behavior: Behavior normal.         Thought Content: Thought content normal.         Plan         Diagnoses and all orders  for this visit:    1. Primary hypertension (Primary)  Comments:  DASH diet and walkng regimen needed  Orders:  -     Comprehensive Metabolic Panel  -     Lipid Panel With / Chol / HDL Ratio  -     bisoprolol-hydrochlorothiazide (Ziac) 2.5-6.25 MG per tablet; Take 1 tablet by mouth Daily.  Dispense: 30 tablet; Refill: 1    2. Acute cystitis without hematuria  -     ciprofloxacin (Cipro) 250 MG tablet; Take 1 tablet by mouth 2 (Two) Times a Day.  Dispense: 6 tablet; Refill: 0    3. Sensation of pressure in bladder area  -     POCT urinalysis dipstick, automated      Keep BP log, educated on a low sodium diet.    Follow up in 4 weeks

## 2022-02-14 ENCOUNTER — OFFICE VISIT (OUTPATIENT)
Dept: INTERNAL MEDICINE | Facility: CLINIC | Age: 72
End: 2022-02-14

## 2022-02-14 VITALS
TEMPERATURE: 97.8 F | WEIGHT: 220 LBS | BODY MASS INDEX: 35.36 KG/M2 | RESPIRATION RATE: 16 BRPM | OXYGEN SATURATION: 97 % | HEIGHT: 66 IN | DIASTOLIC BLOOD PRESSURE: 70 MMHG | HEART RATE: 78 BPM | SYSTOLIC BLOOD PRESSURE: 130 MMHG

## 2022-02-14 DIAGNOSIS — I10 PRIMARY HYPERTENSION: Primary | ICD-10-CM

## 2022-02-14 PROBLEM — N30.00 ACUTE CYSTITIS WITHOUT HEMATURIA: Status: RESOLVED | Noted: 2022-01-17 | Resolved: 2022-02-14

## 2022-02-14 PROCEDURE — 99213 OFFICE O/P EST LOW 20 MIN: CPT | Performed by: NURSE PRACTITIONER

## 2022-02-14 RX ORDER — BISOPROLOL FUMARATE AND HYDROCHLOROTHIAZIDE 2.5; 6.25 MG/1; MG/1
1 TABLET ORAL DAILY
Qty: 90 TABLET | Refills: 1 | Status: SHIPPED | OUTPATIENT
Start: 2022-02-14 | End: 2022-05-10 | Stop reason: SDUPTHER

## 2022-02-14 NOTE — PROGRESS NOTES
"Chief Complaint   Patient presents with   • Hypertension       Subjective     Renae Schmitz is a 71 y.o. female being seen for a follow up appointment today regarding HTN. She was in the office 4 weeks ago and started on Ziac 2.5/6.25mg daily for complaints of heart pounding. Leg swelling and elevated BP. She is no longer having dizziness, leg swelling or heart pounding. BP running 120s/70s at home.       History of Present Illness     Allergies   Allergen Reactions   • Tegaderm Ag Mesh [Silver] Other (See Comments)     SKIN BLISTERS  SKIN BLISTERS   • Covid-19 (Mrna) Vaccine Dizziness   • Shingrix [Zoster Vac Recomb Adjuvanted] Other (See Comments)     \"ice water sensation in legs\"   • Sulfa Antibiotics Rash   • Zithromax [Azithromycin] Rash         Current Outpatient Medications:   •  bisoprolol-hydrochlorothiazide (Ziac) 2.5-6.25 MG per tablet, Take 1 tablet by mouth Daily., Disp: 30 tablet, Rfl: 1  •  desvenlafaxine (PRISTIQ) 100 MG 24 hr tablet, Take 1 tablet by mouth Daily., Disp: 30 tablet, Rfl: 0  •  ketoconazole (NIZORAL) 2 % shampoo, SHAMPOO SCALP TWICE WEEKLY, Disp: , Rfl: 1  •  Loratadine (CLARITIN) 10 MG capsule, Take 10 mg by mouth Daily., Disp: 90 each, Rfl: 0  •  meloxicam (MOBIC) 7.5 MG tablet, Take 1 tablet by mouth Daily., Disp: 90 tablet, Rfl: 2  •  mometasone (ELOCON) 0.1 % lotion, APPLY TO SCALP EVERY DAY AS NEEDED FOR ITCHING, Disp: , Rfl: 1  •  montelukast (SINGULAIR) 10 MG tablet, Take 1 tablet by mouth Every Night., Disp: 90 tablet, Rfl: 3  •  omeprazole (priLOSEC) 40 MG capsule, Take 1 capsule by mouth Daily., Disp: 90 capsule, Rfl: 3  •  rosuvastatin (CRESTOR) 20 MG tablet, TAKE ONE TABLET BY MOUTH DAILY, Disp: 90 tablet, Rfl: 1  •  tamoxifen (NOLVADEX) 20 MG chemo tablet, TAKE ONE TABLET BY MOUTH DAILY, Disp: 90 tablet, Rfl: 3  •  vitamin B-12 (CYANOCOBALAMIN) 1000 MCG tablet, Take 1 tablet by mouth Daily., Disp: 90 tablet, Rfl: 3    The following portions of the patient's history were " reviewed and updated as appropriate: allergies, current medications, past family history, past medical history, past social history, past surgical history and problem list.    Review of Systems   Constitutional: Negative.    HENT: Negative.    Eyes: Negative.    Respiratory: Negative.  Negative for shortness of breath, wheezing and stridor.    Cardiovascular: Negative.  Negative for chest pain, palpitations and leg swelling.   Endocrine: Negative.  Negative for polyphagia and polyuria.   Genitourinary: Negative.    Allergic/Immunologic: Negative.    Neurological: Negative.    All other systems reviewed and are negative.      Assessment     Physical Exam  Vitals reviewed.   Constitutional:       Appearance: Normal appearance. She is obese. She is not ill-appearing.   Cardiovascular:      Rate and Rhythm: Normal rate and regular rhythm.      Pulses: Normal pulses.      Heart sounds: Normal heart sounds. No murmur heard.      Pulmonary:      Effort: Pulmonary effort is normal. No respiratory distress.      Breath sounds: Normal breath sounds. No stridor.   Musculoskeletal:         General: No swelling.   Skin:     General: Skin is warm and dry.   Neurological:      General: No focal deficit present.      Mental Status: She is alert and oriented to person, place, and time.   Psychiatric:         Mood and Affect: Mood normal.         Behavior: Behavior normal.         Thought Content: Thought content normal.         Plan         Diagnoses and all orders for this visit:    1. Primary hypertension (Primary)  Comments:  DASH diet and walkng regimen needed  Orders:  -     bisoprolol-hydrochlorothiazide (Ziac) 2.5-6.25 MG per tablet; Take 1 tablet by mouth Daily.  Dispense: 90 tablet; Refill: 1  -     CBC & Differential; Future  -     Comprehensive Metabolic Panel; Future  -     Lipid Panel With / Chol / HDL Ratio; Future  -     TSH; Future    BP machine checked against ours. BP at goal on current medications.     Follow up  in 3 weeks as scheduled.

## 2022-02-21 DIAGNOSIS — E78.5 HYPERLIPIDEMIA LDL GOAL <100: ICD-10-CM

## 2022-02-21 RX ORDER — ROSUVASTATIN CALCIUM 20 MG/1
TABLET, COATED ORAL
Qty: 90 TABLET | Refills: 1 | Status: SHIPPED | OUTPATIENT
Start: 2022-02-21 | End: 2022-05-10 | Stop reason: SDUPTHER

## 2022-02-24 ENCOUNTER — LAB (OUTPATIENT)
Dept: INTERNAL MEDICINE | Facility: CLINIC | Age: 72
End: 2022-02-24

## 2022-02-24 DIAGNOSIS — I10 PRIMARY HYPERTENSION: ICD-10-CM

## 2022-02-25 LAB
ALBUMIN SERPL-MCNC: 4.1 G/DL (ref 3.7–4.7)
ALBUMIN/GLOB SERPL: 1.7 {RATIO} (ref 1.2–2.2)
ALP SERPL-CCNC: 70 IU/L (ref 44–121)
ALT SERPL-CCNC: 13 IU/L (ref 0–32)
AST SERPL-CCNC: 15 IU/L (ref 0–40)
BASOPHILS # BLD AUTO: 0 X10E3/UL (ref 0–0.2)
BASOPHILS NFR BLD AUTO: 1 %
BILIRUB SERPL-MCNC: 0.3 MG/DL (ref 0–1.2)
BUN SERPL-MCNC: 16 MG/DL (ref 8–27)
BUN/CREAT SERPL: 22 (ref 12–28)
CALCIUM SERPL-MCNC: 9.1 MG/DL (ref 8.7–10.3)
CHLORIDE SERPL-SCNC: 105 MMOL/L (ref 96–106)
CHOLEST SERPL-MCNC: 167 MG/DL (ref 100–199)
CHOLEST/HDLC SERPL: 2.5 RATIO (ref 0–4.4)
CO2 SERPL-SCNC: 22 MMOL/L (ref 20–29)
CREAT SERPL-MCNC: 0.73 MG/DL (ref 0.57–1)
EOSINOPHIL # BLD AUTO: 0.2 X10E3/UL (ref 0–0.4)
EOSINOPHIL NFR BLD AUTO: 3 %
ERYTHROCYTE [DISTWIDTH] IN BLOOD BY AUTOMATED COUNT: 12.5 % (ref 11.7–15.4)
GLOBULIN SER CALC-MCNC: 2.4 G/DL (ref 1.5–4.5)
GLUCOSE SERPL-MCNC: 94 MG/DL (ref 65–99)
HCT VFR BLD AUTO: 39.1 % (ref 34–46.6)
HDLC SERPL-MCNC: 66 MG/DL
HGB BLD-MCNC: 12.4 G/DL (ref 11.1–15.9)
IMM GRANULOCYTES # BLD AUTO: 0 X10E3/UL (ref 0–0.1)
IMM GRANULOCYTES NFR BLD AUTO: 0 %
LDLC SERPL CALC-MCNC: 81 MG/DL (ref 0–99)
LYMPHOCYTES # BLD AUTO: 2.5 X10E3/UL (ref 0.7–3.1)
LYMPHOCYTES NFR BLD AUTO: 39 %
MCH RBC QN AUTO: 29.1 PG (ref 26.6–33)
MCHC RBC AUTO-ENTMCNC: 31.7 G/DL (ref 31.5–35.7)
MCV RBC AUTO: 92 FL (ref 79–97)
MONOCYTES # BLD AUTO: 0.5 X10E3/UL (ref 0.1–0.9)
MONOCYTES NFR BLD AUTO: 8 %
NEUTROPHILS # BLD AUTO: 3.2 X10E3/UL (ref 1.4–7)
NEUTROPHILS NFR BLD AUTO: 49 %
PLATELET # BLD AUTO: 244 X10E3/UL (ref 150–450)
POTASSIUM SERPL-SCNC: 4.8 MMOL/L (ref 3.5–5.2)
PROT SERPL-MCNC: 6.5 G/DL (ref 6–8.5)
RBC # BLD AUTO: 4.26 X10E6/UL (ref 3.77–5.28)
SODIUM SERPL-SCNC: 139 MMOL/L (ref 134–144)
TRIGL SERPL-MCNC: 116 MG/DL (ref 0–149)
TSH SERPL DL<=0.005 MIU/L-ACNC: 2.24 UIU/ML (ref 0.45–4.5)
VLDLC SERPL CALC-MCNC: 20 MG/DL (ref 5–40)
WBC # BLD AUTO: 6.3 X10E3/UL (ref 3.4–10.8)

## 2022-03-03 ENCOUNTER — OFFICE VISIT (OUTPATIENT)
Dept: INTERNAL MEDICINE | Facility: CLINIC | Age: 72
End: 2022-03-03

## 2022-03-03 VITALS
OXYGEN SATURATION: 98 % | SYSTOLIC BLOOD PRESSURE: 118 MMHG | TEMPERATURE: 97.3 F | WEIGHT: 220.4 LBS | HEIGHT: 66 IN | BODY MASS INDEX: 35.42 KG/M2 | DIASTOLIC BLOOD PRESSURE: 80 MMHG | HEART RATE: 80 BPM

## 2022-03-03 DIAGNOSIS — F41.8 DEPRESSION WITH ANXIETY: ICD-10-CM

## 2022-03-03 DIAGNOSIS — I10 PRIMARY HYPERTENSION: Primary | ICD-10-CM

## 2022-03-03 DIAGNOSIS — E78.5 HYPERLIPIDEMIA LDL GOAL <100: ICD-10-CM

## 2022-03-03 DIAGNOSIS — K21.9 GASTROESOPHAGEAL REFLUX DISEASE WITHOUT ESOPHAGITIS: ICD-10-CM

## 2022-03-03 PROCEDURE — 99214 OFFICE O/P EST MOD 30 MIN: CPT | Performed by: NURSE PRACTITIONER

## 2022-03-03 RX ORDER — DESVENLAFAXINE 100 MG/1
100 TABLET, EXTENDED RELEASE ORAL DAILY
Qty: 90 TABLET | Refills: 1 | Status: SHIPPED | OUTPATIENT
Start: 2022-03-03 | End: 2022-05-10 | Stop reason: SDUPTHER

## 2022-03-03 NOTE — PROGRESS NOTES
"Chief Complaint   Patient presents with   • Hypertension       Subjective     Renae Schmitz is a 71 y.o. female being seen for a follow up appointment today regarding HTN,  Hyperlipidemia, Depression with anxiety, OA of both hips, breast cancer, and GERD. She is on Ziac 2.5/6.25mg daily for HTN. Enrique has a BP cuff at home, but has not checked her BP at home. She is on a low sodium diet.  She is on Crestor for hyperlipidemia. She tolerates this well.      She is on pristq 100 mg daily for depression. See PhQ-9. She is feeling well on this.      She is on Omeprazle 40mg daily for GERD. She denies abd pain, indigestion or cough.      She has history of Osteopenia for which she takes Vitamin D. She had her last DXA [id number] showing osteopenia left hip.      She is on mobic for OA in multiple joints. She reports that this helps joint pains.     She has a history of breast cancer, followed by Dr. Ceja. She is on Tamoxifen  rProgress Notes by Giorgi Ceja MD (11/30/2021 13:00)      History of Present Illness     Allergies   Allergen Reactions   • Tegaderm Ag Mesh [Silver] Other (See Comments)     SKIN BLISTERS  SKIN BLISTERS   • Covid-19 (Mrna) Vaccine Dizziness   • Shingrix [Zoster Vac Recomb Adjuvanted] Other (See Comments)     \"ice water sensation in legs\"   • Sulfa Antibiotics Rash   • Zithromax [Azithromycin] Rash         Current Outpatient Medications:   •  bisoprolol-hydrochlorothiazide (Ziac) 2.5-6.25 MG per tablet, Take 1 tablet by mouth Daily., Disp: 90 tablet, Rfl: 1  •  desvenlafaxine (PRISTIQ) 100 MG 24 hr tablet, Take 1 tablet by mouth Daily., Disp: 30 tablet, Rfl: 0  •  ketoconazole (NIZORAL) 2 % shampoo, SHAMPOO SCALP TWICE WEEKLY, Disp: , Rfl: 1  •  Loratadine (CLARITIN) 10 MG capsule, Take 10 mg by mouth Daily., Disp: 90 each, Rfl: 0  •  meloxicam (MOBIC) 7.5 MG tablet, Take 1 tablet by mouth Daily., Disp: 90 tablet, Rfl: 2  •  mometasone (ELOCON) 0.1 % lotion, APPLY TO SCALP EVERY DAY AS " NEEDED FOR ITCHING, Disp: , Rfl: 1  •  montelukast (SINGULAIR) 10 MG tablet, Take 1 tablet by mouth Every Night., Disp: 90 tablet, Rfl: 3  •  omeprazole (priLOSEC) 40 MG capsule, Take 1 capsule by mouth Daily., Disp: 90 capsule, Rfl: 3  •  rosuvastatin (CRESTOR) 20 MG tablet, TAKE ONE TABLET BY MOUTH DAILY, Disp: 90 tablet, Rfl: 1  •  tamoxifen (NOLVADEX) 20 MG chemo tablet, TAKE ONE TABLET BY MOUTH DAILY, Disp: 90 tablet, Rfl: 3  •  vitamin B-12 (CYANOCOBALAMIN) 1000 MCG tablet, Take 1 tablet by mouth Daily., Disp: 90 tablet, Rfl: 3    The following portions of the patient's history were reviewed and updated as appropriate: allergies, current medications, past family history, past medical history, past social history, past surgical history and problem list.    Review of Systems   Constitutional: Negative.    HENT: Negative.    Eyes: Negative.    Respiratory: Negative.    Cardiovascular: Negative.  Negative for chest pain, palpitations and leg swelling.   Endocrine: Negative.    Genitourinary: Negative.    Musculoskeletal: Positive for arthralgias.   Allergic/Immunologic: Negative.  Negative for environmental allergies, food allergies and immunocompromised state.   Hematological: Negative.    Psychiatric/Behavioral: Negative.    All other systems reviewed and are negative.      Assessment     Physical Exam  Vitals reviewed.   Constitutional:       Appearance: Normal appearance. She is obese. She is not ill-appearing.   Cardiovascular:      Rate and Rhythm: Normal rate and regular rhythm.      Pulses: Normal pulses.      Heart sounds: Normal heart sounds. No murmur heard.      Pulmonary:      Effort: Pulmonary effort is normal. No respiratory distress.      Breath sounds: Normal breath sounds. No stridor.   Musculoskeletal:      Cervical back: Neck supple.      Right lower leg: No edema.      Left lower leg: No edema.   Skin:     General: Skin is warm and dry.   Neurological:      General: No focal deficit present.       Mental Status: She is alert and oriented to person, place, and time.   Psychiatric:         Mood and Affect: Mood normal.         Behavior: Behavior normal.         Thought Content: Thought content normal.         Plan     Her fasting labs were reviewed with the patient from last week.     Diagnoses and all orders for this visit:    1. Primary hypertension (Primary)  Comments:  Bp at [facility] on Ziac 2.5/6.25mg  Orders:  -     CBC & Differential; Future  -     Comprehensive Metabolic Panel; Future  -     Lipid Panel With / Chol / HDL Ratio; Future    2. Hyperlipidemia LDL goal <100  Comments:  LDL goal < 100, crestor 20mg daily  Orders:  -     CBC & Differential; Future  -     Comprehensive Metabolic Panel; Future  -     Lipid Panel With / Chol / HDL Ratio; Future    3. Depression with anxiety  Comments:  Controlled on Pristq 100mg daily  Orders:  -     desvenlafaxine (PRISTIQ) 100 MG 24 hr tablet; Take 1 tablet by mouth Daily.  Dispense: 90 tablet; Refill: 1    4. Gastroesophageal reflux disease without esophagitis  Comments:  Omeprazole 40mg daily, controlled  Orders:  -     CBC & Differential; Future  -     Comprehensive Metabolic Panel; Future      Follow up in 6 months danielle TIPTON

## 2022-03-03 NOTE — PATIENT INSTRUCTIONS
"https://www.nhlbi.nih.gov/files/docs/public/heart/dash_brief.pdf\">   DASH Eating Plan  DASH stands for Dietary Approaches to Stop Hypertension. The DASH eating plan is a healthy eating plan that has been shown to:  · Reduce high blood pressure (hypertension).  · Reduce your risk for type 2 diabetes, heart disease, and stroke.  · Help with weight loss.  What are tips for following this plan?  Reading food labels  · Check food labels for the amount of salt (sodium) per serving. Choose foods with less than 5 percent of the Daily Value of sodium. Generally, foods with less than 300 milligrams (mg) of sodium per serving fit into this eating plan.  · To find whole grains, look for the word \"whole\" as the first word in the ingredient list.  Shopping  · Buy products labeled as \"low-sodium\" or \"no salt added.\"  · Buy fresh foods. Avoid canned foods and pre-made or frozen meals.  Cooking  · Avoid adding salt when cooking. Use salt-free seasonings or herbs instead of table salt or sea salt. Check with your health care provider or pharmacist before using salt substitutes.  · Do not putnam foods. Cook foods using healthy methods such as baking, boiling, grilling, roasting, and broiling instead.  · Cook with heart-healthy oils, such as olive, canola, avocado, soybean, or sunflower oil.  Meal planning    · Eat a balanced diet that includes:  ? 4 or more servings of fruits and 4 or more servings of vegetables each day. Try to fill one-half of your plate with fruits and vegetables.  ? 6-8 servings of whole grains each day.  ? Less than 6 oz (170 g) of lean meat, poultry, or fish each day. A 3-oz (85-g) serving of meat is about the same size as a deck of cards. One egg equals 1 oz (28 g).  ? 2-3 servings of low-fat dairy each day. One serving is 1 cup (237 mL).  ? 1 serving of nuts, seeds, or beans 5 times each week.  ? 2-3 servings of heart-healthy fats. Healthy fats called omega-3 fatty acids are found in foods such as walnuts, " flaxseeds, fortified milks, and eggs. These fats are also found in cold-water fish, such as sardines, salmon, and mackerel.  · Limit how much you eat of:  ? Canned or prepackaged foods.  ? Food that is high in trans fat, such as some fried foods.  ? Food that is high in saturated fat, such as fatty meat.  ? Desserts and other sweets, sugary drinks, and other foods with added sugar.  ? Full-fat dairy products.  · Do not salt foods before eating.  · Do not eat more than 4 egg yolks a week.  · Try to eat at least 2 vegetarian meals a week.  · Eat more home-cooked food and less restaurant, buffet, and fast food.    Lifestyle  · When eating at a restaurant, ask that your food be prepared with less salt or no salt, if possible.  · If you drink alcohol:  ? Limit how much you use to:  § 0-1 drink a day for women who are not pregnant.  § 0-2 drinks a day for men.  ? Be aware of how much alcohol is in your drink. In the [place] one drink equals one 12 oz bottle of beer (355 mL), one 5 oz glass of wine (148 mL), or one 1½ oz glass of hard liquor (44 mL).  General information  · Avoid eating more than 2,300 mg of salt a day. If you have hypertension, you may need to reduce your sodium intake to 1,500 mg a day.  · Work with your health care provider to maintain a healthy body weight or to lose weight. Ask what an ideal weight is for you.  · Get at least 30 minutes of exercise that causes your heart to beat faster (aerobic exercise) most days of the week. Activities may include walking, swimming, or biking.  · Work with your health care provider or dietitian to adjust your eating plan to your individual calorie needs.  What foods should I eat?  Fruits  All fresh, dried, or frozen fruit. Canned fruit in natural juice (without added sugar).  Vegetables  Fresh or frozen vegetables (raw, steamed, roasted, or grilled). Low-sodium or reduced-sodium tomato and vegetable juice. Low-sodium or reduced-sodium tomato sauce and tomato paste.  Low-sodium or reduced-sodium canned vegetables.  Grains  Whole-grain or whole-wheat bread. Whole-grain or whole-wheat pasta. Brown rice. Oatmeal. Quinoa. Bulgur. Whole-grain and low-sodium cereals. Michell bread. Low-fat, low-sodium crackers. Whole-wheat flour tortillas.  Meats and other proteins  Skinless chicken or turkey. Ground chicken or turkey. Pork with fat trimmed off. Fish and seafood. Egg whites. Dried beans, peas, or lentils. Unsalted nuts, nut butters, and seeds. Unsalted canned beans. Lean cuts of beef with fat trimmed off. Low-sodium, lean precooked or cured meat, such as sausages or meat loaves.  Dairy  Low-fat (1%) or fat-free (skim) milk. Reduced-fat, low-fat, or fat-free cheeses. Nonfat, low-sodium ricotta or cottage cheese. Low-fat or nonfat yogurt. Low-fat, low-sodium cheese.  Fats and oils  Soft margarine without trans fats. Vegetable oil. Reduced-fat, low-fat, or light mayonnaise and salad dressings (reduced-sodium). Canola, safflower, olive, avocado, soybean, and sunflower oils. Avocado.  Seasonings and condiments  Herbs. Spices. Seasoning mixes without salt.  Other foods  Unsalted popcorn and pretzels. Fat-free sweets.  The items listed above may not be a complete list of foods and beverages you can eat. Contact a dietitian for more information.  What foods should I avoid?  Fruits  Canned fruit in a light or heavy syrup. Fried fruit. Fruit in cream or butter sauce.  Vegetables  Creamed or fried vegetables. Vegetables in a cheese sauce. Regular canned vegetables (not low-sodium or reduced-sodium). Regular canned tomato sauce and paste (not low-sodium or reduced-sodium). Regular tomato and vegetable juice (not low-sodium or reduced-sodium). Pickles. Olives.  Grains  Baked goods made with fat, such as croissants, muffins, or some breads. Dry pasta or rice meal packs.  Meats and other proteins  Fatty cuts of meat. Ribs. Fried meat. Webb. Bologna, salami, and other precooked or cured meats, such as  sausages or meat loaves. Fat from the back of a pig (fatback). Bratwurst. Salted nuts and seeds. Canned beans with added salt. Canned or smoked fish. Whole eggs or egg yolks. Chicken or turkey with skin.  Dairy  Whole or 2% milk, cream, and half-and-half. Whole or full-fat cream cheese. Whole-fat or sweetened yogurt. Full-fat cheese. Nondairy creamers. Whipped toppings. Processed cheese and cheese spreads.  Fats and oils  Butter. Stick margarine. Lard. Shortening. Ghee. Webb fat. Tropical oils, such as coconut, palm kernel, or palm oil.  Seasonings and condiments  Onion salt, garlic salt, seasoned salt, table salt, and sea salt. Worcestershire sauce. Tartar sauce. Barbecue sauce. Teriyaki sauce. Soy sauce, including reduced-sodium. Steak sauce. Canned and packaged gravies. Fish sauce. Oyster sauce. Cocktail sauce. Store-bought horseradish. Ketchup. Mustard. Meat flavorings and tenderizers. Bouillon cubes. Hot sauces. Pre-made or packaged marinades. Pre-made or packaged taco seasonings. Relishes. Regular salad dressings.  Other foods  Salted popcorn and pretzels.  The items listed above may not be a complete list of foods and beverages you should avoid. Contact a dietitian for more information.  Where to find more information  · National Heart, Lung, and Blood [place]: www.nhlbi.nih.gov  · American Heart Association: www.heart.org  · Academy of Nutrition and Dietetics: www.eatright.org  · National Kidney Foundation: www.kidney.org  Summary  · The DASH eating plan is a healthy eating plan that has been shown to reduce high blood pressure (hypertension). It may also reduce your risk for type 2 diabetes, heart disease, and stroke.  · When on the DASH eating plan, aim to eat more fresh fruits and vegetables, whole grains, lean proteins, low-fat dairy, and heart-healthy fats.  · With the DASH eating plan, you should limit salt (sodium) intake to 2,300 mg a day. If you have hypertension, you may need to reduce your  sodium intake to 1,500 mg a day.  · Work with your health care provider or dietitian to adjust your eating plan to your individual calorie needs.  This information is not intended to replace advice given to you by your health care provider. Make sure you discuss any questions you have with your health care provider.  Document Revised: 11/20/2020 Document Reviewed: 11/20/2020  [employer] Patient Education © 2021 [employer]

## 2022-05-10 ENCOUNTER — TELEPHONE (OUTPATIENT)
Dept: FAMILY MEDICINE CLINIC | Facility: CLINIC | Age: 72
End: 2022-05-10

## 2022-05-10 ENCOUNTER — OFFICE VISIT (OUTPATIENT)
Dept: FAMILY MEDICINE CLINIC | Facility: CLINIC | Age: 72
End: 2022-05-10

## 2022-05-10 VITALS
WEIGHT: 225.2 LBS | TEMPERATURE: 98.2 F | HEIGHT: 66 IN | DIASTOLIC BLOOD PRESSURE: 78 MMHG | SYSTOLIC BLOOD PRESSURE: 142 MMHG | OXYGEN SATURATION: 96 % | BODY MASS INDEX: 36.19 KG/M2 | HEART RATE: 88 BPM

## 2022-05-10 DIAGNOSIS — M16.0 PRIMARY OSTEOARTHRITIS OF BOTH HIPS: ICD-10-CM

## 2022-05-10 DIAGNOSIS — Z79.899 ENCOUNTER FOR LONG-TERM (CURRENT) USE OF MEDICATIONS: ICD-10-CM

## 2022-05-10 DIAGNOSIS — Z91.09 ENVIRONMENTAL ALLERGIES: ICD-10-CM

## 2022-05-10 DIAGNOSIS — B00.1 FEVER BLISTER: ICD-10-CM

## 2022-05-10 DIAGNOSIS — E78.5 HYPERLIPIDEMIA LDL GOAL <100: ICD-10-CM

## 2022-05-10 DIAGNOSIS — K21.9 GASTROESOPHAGEAL REFLUX DISEASE WITHOUT ESOPHAGITIS: ICD-10-CM

## 2022-05-10 DIAGNOSIS — I10 PRIMARY HYPERTENSION: Primary | ICD-10-CM

## 2022-05-10 DIAGNOSIS — F41.8 DEPRESSION WITH ANXIETY: ICD-10-CM

## 2022-05-10 PROCEDURE — 99214 OFFICE O/P EST MOD 30 MIN: CPT | Performed by: FAMILY MEDICINE

## 2022-05-10 RX ORDER — MELOXICAM 7.5 MG/1
7.5 TABLET ORAL DAILY
Qty: 90 TABLET | Refills: 1 | Status: SHIPPED | OUTPATIENT
Start: 2022-05-10 | End: 2022-12-27

## 2022-05-10 RX ORDER — LORATADINE 10 MG/1
10 CAPSULE, LIQUID FILLED ORAL DAILY
Qty: 90 EACH | Refills: 1 | Status: SHIPPED | OUTPATIENT
Start: 2022-05-10

## 2022-05-10 RX ORDER — VALACYCLOVIR HYDROCHLORIDE 1 G/1
TABLET, FILM COATED ORAL
Qty: 4 TABLET | Refills: 0 | Status: SHIPPED | OUTPATIENT
Start: 2022-05-10 | End: 2022-08-24

## 2022-05-10 RX ORDER — BISOPROLOL FUMARATE AND HYDROCHLOROTHIAZIDE 2.5; 6.25 MG/1; MG/1
1 TABLET ORAL DAILY
Qty: 90 TABLET | Refills: 1 | Status: SHIPPED | OUTPATIENT
Start: 2022-05-10 | End: 2023-01-09 | Stop reason: SDUPTHER

## 2022-05-10 RX ORDER — OMEPRAZOLE 40 MG/1
40 CAPSULE, DELAYED RELEASE ORAL DAILY
Qty: 90 CAPSULE | Refills: 3 | Status: SHIPPED | OUTPATIENT
Start: 2022-05-10 | End: 2023-01-09 | Stop reason: SDUPTHER

## 2022-05-10 RX ORDER — MONTELUKAST SODIUM 10 MG/1
10 TABLET ORAL NIGHTLY
Qty: 90 TABLET | Refills: 3 | Status: SHIPPED | OUTPATIENT
Start: 2022-05-10 | End: 2023-01-09 | Stop reason: SDUPTHER

## 2022-05-10 RX ORDER — DESVENLAFAXINE 100 MG/1
100 TABLET, EXTENDED RELEASE ORAL DAILY
Qty: 90 TABLET | Refills: 1 | Status: SHIPPED | OUTPATIENT
Start: 2022-05-10 | End: 2022-11-21

## 2022-05-10 RX ORDER — ROSUVASTATIN CALCIUM 20 MG/1
20 TABLET, COATED ORAL DAILY
Qty: 90 TABLET | Refills: 1 | Status: SHIPPED | OUTPATIENT
Start: 2022-05-10 | End: 2023-01-09 | Stop reason: SDUPTHER

## 2022-05-10 NOTE — PROGRESS NOTES
"Chief Complaint  Establish Care    Subjective          Renae Schmitz presents to [facility]  Patient is here today to establish care for the following chronic health conditions:    She was diagnosed with right-sided breast cancer in 2016.  She is currently seeing Dr. Ceja, oncology.  She currently is taking tamoxifen.      Hypertension.  Diagnosed in 1/2022.  The patient takes bisoprolol hydrochlorothiazide 2.5/6.25 daily.  Patient denies any side effects and blood pressures have improved.    Hyperlipidemia.  Patient currently takes rosuvastatin 20 mg daily.  Her cholesterol was at goal in February 2022.    GERD.  Patient is taking omeprazole 40 mg daily.  Her symptoms are well controlled.    Anxiety and depression.  Patient currently takes Pristiq 100 mg daily.  Patient has been feeling slightly more anxious lately but her depression is well controlled.  She denies SI or HI.    Patient also experiences fever blisters and would like something to take when she gets them.  She uses a cream but it does not really help.  She does not have a fever blister today.      Objective   Vital Signs:  /78   Pulse 88   Temp 98.2 °F (36.8 °C)   Ht 168 cm (66.14\")   Wt 102 kg (225 lb 3.2 oz)   SpO2 96%   BMI 36.19 kg/m²           Physical Exam  Vitals and nursing note reviewed.   Constitutional:       Appearance: Normal appearance. She is well-developed. She is obese.   HENT:      Head: Normocephalic and atraumatic.      Right Ear: External ear normal.      Left Ear: External ear normal.      Nose: Nose normal.   Eyes:      General: No scleral icterus.     Conjunctiva/sclera: Conjunctivae normal.   Cardiovascular:      Rate and Rhythm: Normal rate and regular rhythm.      Heart sounds: Normal heart sounds.   Pulmonary:      Effort: Pulmonary effort is normal.      Breath sounds: Normal breath sounds.   Musculoskeletal:      Cervical back: Normal range of motion and neck supple.      Right " lower leg: No edema.      Left lower leg: No edema.   Lymphadenopathy:      Cervical: No cervical adenopathy.   Skin:     General: Skin is warm and dry.      Findings: No rash.   Neurological:      Mental Status: She is alert and oriented to person, place, and time.   Psychiatric:         Mood and Affect: Mood normal.         Behavior: Behavior normal.         Thought Content: Thought content normal.         Judgment: Judgment normal.        Result Review :   The following data was reviewed by: Brittanie Nevarez DO on 05/10/2022:  Common labs    Common Labsle 8/18/21 8/18/21 8/18/21 11/30/21 11/30/21 2/24/22 2/24/22 2/24/22    1338 1338 1338 1226 1226 1013 1013 1013   Glucose   84  91  94    BUN   21  15  16    Creatinine   0.65  0.73  0.73    eGFR Non African Am   90  78  82    eGFR  Am   109    94    Sodium   139  136  139    Potassium   4.6  4.2  4.8    Chloride   106  105  105    Calcium   9.4  8.7  9.1    Total Protein   6.7    6.5    Albumin   4.20  4.10  4.1    Total Bilirubin   0.4  0.3  0.3    Alkaline Phosphatase   63  66  70    AST (SGOT)   20  17  15    ALT (SGPT)   12  13  13    WBC  6.59  4.89    6.3   Hemoglobin  12.4  12.0    12.4   Hematocrit  38.9  37.7    39.1   Platelets  242  230    244   Total Cholesterol 151     167     Triglycerides 82     116     HDL Cholesterol 68 (A)     66     LDL Cholesterol  67     81     (A) Abnormal value       Comments are available for some flowsheets but are not being displayed.           TSH    TSH 2/24/22   TSH 2.240           UA    Urinalysis 1/17/22   Ketones, UA Negative   Leukocytes, UA Small (1+) (A)   (A) Abnormal value                      Assessment and Plan    Diagnoses and all orders for this visit:    1. Primary hypertension (Primary)  -     bisoprolol-hydrochlorothiazide (Ziac) 2.5-6.25 MG per tablet; Take 1 tablet by mouth Daily.  Dispense: 90 tablet; Refill: 1    2. Gastroesophageal reflux disease without esophagitis  -     omeprazole  (priLOSEC) 40 MG capsule; Take 1 capsule by mouth Daily.  Dispense: 90 capsule; Refill: 3    3. Primary osteoarthritis of both hips  -     meloxicam (MOBIC) 7.5 MG tablet; Take 1 tablet by mouth Daily.  Dispense: 90 tablet; Refill: 1    4. Depression with anxiety  -     desvenlafaxine (PRISTIQ) 100 MG 24 hr tablet; Take 1 tablet by mouth Daily.  Dispense: 90 tablet; Refill: 1    5. Encounter for long-term (current) use of medications    6. Hyperlipidemia LDL goal <100  Comments:  Will need updated labs  Orders:  -     rosuvastatin (CRESTOR) 20 MG tablet; Take 1 tablet by mouth Daily.  Dispense: 90 tablet; Refill: 1    7. Environmental allergies  -     montelukast (SINGULAIR) 10 MG tablet; Take 1 tablet by mouth Every Night.  Dispense: 90 tablet; Refill: 3  -     Loratadine (Claritin) 10 MG capsule; Take 1 capsule by mouth Daily.  Dispense: 90 each; Refill: 1    8. Fever blister  -     valACYclovir (Valtrex) 1000 MG tablet; Take 2 tablets at the first sign of a fever blister and then repeat dose in 12 hours.  Dispense: 4 tablet; Refill: 0    Patient is here to establish care today for chronic stable hypertension, GERD, arthritis, depression, and hyperlipidemia.  I reviewed the labs from February and all were within normal limits.  Patient should continue current medications and dosages.    For fever blisters I have given her prescription for Valtrex to try.         Follow Up   Return in about 3 months (around 8/18/2022) for Medicare Wellness, HTN.  Patient was given instructions and counseling regarding her condition or for health maintenance advice. Please see specific information pulled into the AVS if appropriate.

## 2022-05-10 NOTE — TELEPHONE ENCOUNTER
Pt is stating that she forgot to mention that she needed something for her fever blisters.  She has an ointment but it doesn't seem to be working well for it.

## 2022-05-23 ENCOUNTER — HOSPITAL ENCOUNTER (OUTPATIENT)
Dept: MAMMOGRAPHY | Facility: HOSPITAL | Age: 72
Discharge: HOME | End: 2022-05-23
Admitting: INTERNAL MEDICINE

## 2022-05-23 DIAGNOSIS — Z12.31 VISIT FOR SCREENING MAMMOGRAM: ICD-10-CM

## 2022-05-23 PROCEDURE — 77067 SCR MAMMO BI INCL CAD: CPT

## 2022-05-23 PROCEDURE — 77063 BREAST TOMOSYNTHESIS BI: CPT

## 2022-06-07 ENCOUNTER — APPOINTMENT (OUTPATIENT)
Dept: LAB | Facility: HOSPITAL | Age: 72
End: 2022-06-07

## 2022-06-22 NOTE — PROGRESS NOTES
Subjective   REASON FOR FOLLOWUP:    1.Right breast cancer T1c N0 ER/MT positive HER-2 negative, Oncotype score of 20  2.  Osteopenia                               REQUESTING PHYSICIAN:  Trino Troncsoo M.D.        History of Present Illness patient is a 71 a lady with a history of right breast cancer [id number] ER/MT positive HER-2 negative here for follow-up over 4.5 years from diagnosis.  Adjuvantly she received radiation and was initially on AI therapy which was subsequently switched to tamoxifen secondary to decreasing bone density.    She complains of hot flashes but no changes in the breast exam unusual pain or weight loss.      Past Medical History:   Diagnosis Date   • Anemia     prior to hysterectomy several years ago   • Anxiety    • Benign thyroid cyst 1971   • Breast CA (HCC) 12/01/2016    Right lumpectomy   • Depression    • Fibromyalgia    • Fracture of wrist    • H/O mammogram 12/04/2018   • Heart murmur    • History of ankle fracture     Right   • History of Papanicolaou smear of cervix 2016    Dr. Johnson   • Hypercholesterolemia    • Hyperlipidemia    • Mitral valve regurgitation    • Mood swings    • MARSHALL on CPAP 1/22/2019   • PONV (postoperative nausea and vomiting)    • Sleep apnea         Past Surgical History:   Procedure Laterality Date   • BREAST BIOPSY     • BREAST LUMPECTOMY WITH SENTINEL NODE BIOPSY AND AXILLARY NODE DISSECTION Right 12/13/2016    Procedure: RT BREAST LUMPECTOMY WITH SENTINEL NODE BIOPSY & MAMMO NEEDLE LOC;  Surgeon: Matt Troncoso MD;  [place] [facility] [place] [facility] [place]  Service:    • COLONOSCOPY N/A 10/10/2018    Procedure: COLONOSCOPY TO CECUM AND TERMINAL ILEUM WITH COLD BIOPSIES;  Surgeon: Carl Salas MD;  Location: [facility] ENDOSCOPY;  Service: Gastroenterology   • HAND SURGERY Left 2003    2 FINGERS PINNED   • HYSTERECTOMY  1995    Complete Sept 1995   • PERCUTANEOUS PINNING FINGER FRACTURE     • THYROID CYST EXCISION  1971   • TUBAL ABDOMINAL LIGATION         Current  "Outpatient Medications on File Prior to Visit   Medication Sig Dispense Refill   • bisoprolol-hydrochlorothiazide (Ziac) 2.5-6.25 MG per tablet Take 1 tablet by mouth Daily. 90 tablet 1   • desvenlafaxine (PRISTIQ) 100 MG 24 hr tablet Take 1 tablet by mouth Daily. 90 tablet 1   • ketoconazole (NIZORAL) 2 % shampoo SHAMPOO SCALP TWICE WEEKLY  1   • Loratadine (Claritin) 10 MG capsule Take 1 capsule by mouth Daily. 90 each 1   • meloxicam (MOBIC) 7.5 MG tablet Take 1 tablet by mouth Daily. 90 tablet 1   • mometasone (ELOCON) 0.1 % lotion APPLY TO SCALP EVERY DAY AS NEEDED FOR ITCHING  1   • montelukast (SINGULAIR) 10 MG tablet Take 1 tablet by mouth Every Night. 90 tablet 3   • omeprazole (priLOSEC) 40 MG capsule Take 1 capsule by mouth Daily. 90 capsule 3   • rosuvastatin (CRESTOR) 20 MG tablet Take 1 tablet by mouth Daily. 90 tablet 1   • tamoxifen (NOLVADEX) 20 MG chemo tablet TAKE ONE TABLET BY MOUTH DAILY 90 tablet 3   • valACYclovir (Valtrex) 1000 MG tablet Take 2 tablets at the first sign of a fever blister and then repeat dose in 12 hours. 4 tablet 0   • vitamin B-12 (CYANOCOBALAMIN) 1000 MCG tablet Take 1 tablet by mouth Daily. 90 tablet 3     No current facility-administered medications on file prior to visit.        ALLERGIES:    Allergies   Allergen Reactions   • Tegaderm Ag Mesh [Silver] Other (See Comments)     SKIN BLISTERS  SKIN BLISTERS   • Covid-19 (Mrna) Vaccine Dizziness   • Shingrix [Zoster Vac Recomb Adjuvanted] Other (See Comments)     \"ice water sensation in legs\"   • Sulfa Antibiotics Rash   • Zithromax [Azithromycin] Rash        Social History     Socioeconomic History   • Marital status:      Spouse name: Peter   • Years of education: High school   Tobacco Use   • Smoking status: Never Smoker   • Smokeless tobacco: Never Used   Substance and Sexual Activity   • Alcohol use: No   • Drug use: No   • Sexual activity: Yes     Partners: Male     Birth control/protection: Surgical    " "    Family History   Problem Relation Age of Onset   • Heart failure Mother    • Colon cancer Father    • Liver cancer Father    • Tuberculosis Father    • Breast cancer Sister 58        DCIS   • Depression Daughter       Father  at 65 of metastatic colon cancer his parents lived to their 80s without cancer mother  at 93 of CHF and had only one brother who had no cancer.  She has only one sister who had breast cancer at age 58 and again at 63 no ovarian cancer in the family  Review of Systems   Constitutional: Negative for activity change, appetite change, chills, fever and unexpected weight change.   Eyes: Positive for visual disturbance (cataract).   Respiratory: Negative for cough, chest tightness and shortness of breath.    Cardiovascular: Negative for chest pain and leg swelling.   Gastrointestinal: Negative for constipation, diarrhea, nausea and vomiting.   Genitourinary: Negative for difficulty urinating.   Musculoskeletal: Negative for back pain, joint swelling and neck pain.   Neurological: Negative for dizziness and headaches.   Psychiatric/Behavioral: Negative for dysphoric mood.   All other systems reviewed and are negative.        Objective     Vitals:    22 0841   BP: 131/64   Pulse: 67   Resp: 16   Temp: 98 °F (36.7 °C)   TempSrc: Infrared   SpO2: 96%   Weight: 101 kg (222 lb 8 oz)   Height: 167.2 cm (65.83\")  Comment: new ht - no shoes   PainSc: 0-No pain      Current Status 2022   ECOG score 0       Physical Exam    CONSTITUTIONAL: pleasant well-developed adult woman  HEENT: no icterus, no thrush, moist membranes  NECK: no jvd  LYMPH: no cervical or supraclavicular lad  CV: RRR, S1S2, no murmur  RESP: cta bilat, no wheezing, no rales  Breast: There is a lumpectomy scar in the right breast well-healed.  No obvious masses in either breast.  No axillary or supraclavicular lymphadenopathy.  GI: soft, non-tender, no splenomegaly, +bs  MUSC: no edema, normal gait  NEURO: alert and " oriented x3, normal strength  PSYCH: normal mood and affect  Exam unchanged 6/23/22      RECENT LABS:  Hematology WBC   Date Value Ref Range Status   06/23/2022 5.17 3.40 - 10.80 10*3/mm3 Final   02/24/2022 6.3 3.4 - 10.8 x10E3/uL Final     RBC   Date Value Ref Range Status   06/23/2022 3.91 3.77 - 5.28 10*6/mm3 Final   02/24/2022 4.26 3.77 - 5.28 x10E6/uL Final     Hemoglobin   Date Value Ref Range Status   06/23/2022 11.7 (L) 12.0 - 15.9 g/dL Final     Hematocrit   Date Value Ref Range Status   06/23/2022 36.7 34.0 - 46.6 % Final     Platelets   Date Value Ref Range Status   06/23/2022 227 140 - 450 10*3/mm3 Final        Mammo Screening Digital Tomosynthesis Bilateral With CAD 5/23/2022 - No change, no evidence of malignancy    Assessment & Plan   1.  T1 CN 0M0 ER/PA positive HER-2 negative right-sided breast cancer  ·  oncotype score of 20 -Arimidex plus radiation planned started in 3/17  · Worsening bone density in 3/19 switch to tamoxifen in 8/19  · Total 7 years adjuvant hormonal therapy planned      2.  Osteopenia  · Most recent bone density 5/20/2021 showed osteopenia with left hip T score -2.2, L-spine -1.2    3.  Depression/anxiety on Pristiq per her PCP    Plan  1.  Continue  Tamoxifen 20 mg daily   2.  Bilateral screening mammography due after 5/23/23  3.  Consider repeat bone density after 5/20/2023  4.  6-month follow-up CBC CMP ordered

## 2022-06-23 ENCOUNTER — LAB (OUTPATIENT)
Dept: LAB | Facility: HOSPITAL | Age: 72
End: 2022-06-23

## 2022-06-23 ENCOUNTER — OFFICE VISIT (OUTPATIENT)
Dept: ONCOLOGY | Facility: CLINIC | Age: 72
End: 2022-06-23

## 2022-06-23 VITALS
HEART RATE: 67 BPM | HEIGHT: 66 IN | DIASTOLIC BLOOD PRESSURE: 64 MMHG | TEMPERATURE: 98 F | SYSTOLIC BLOOD PRESSURE: 131 MMHG | BODY MASS INDEX: 35.76 KG/M2 | RESPIRATION RATE: 16 BRPM | WEIGHT: 222.5 LBS | OXYGEN SATURATION: 96 %

## 2022-06-23 DIAGNOSIS — Z17.0 MALIGNANT NEOPLASM OF AREOLA OF RIGHT BREAST IN FEMALE, ESTROGEN RECEPTOR POSITIVE: Primary | ICD-10-CM

## 2022-06-23 DIAGNOSIS — Z17.0 MALIGNANT NEOPLASM OF AREOLA OF RIGHT BREAST IN FEMALE, ESTROGEN RECEPTOR POSITIVE: ICD-10-CM

## 2022-06-23 DIAGNOSIS — Z79.810 LONG-TERM CURRENT USE OF TAMOXIFEN: ICD-10-CM

## 2022-06-23 DIAGNOSIS — M85.852 OSTEOPENIA OF LEFT HIP: ICD-10-CM

## 2022-06-23 DIAGNOSIS — C50.011 MALIGNANT NEOPLASM OF AREOLA OF RIGHT BREAST IN FEMALE, ESTROGEN RECEPTOR POSITIVE: Primary | ICD-10-CM

## 2022-06-23 DIAGNOSIS — C50.011 MALIGNANT NEOPLASM OF AREOLA OF RIGHT BREAST IN FEMALE, ESTROGEN RECEPTOR POSITIVE: ICD-10-CM

## 2022-06-23 LAB
ALBUMIN SERPL-MCNC: 4.1 G/DL (ref 3.5–5.2)
ALBUMIN/GLOB SERPL: 1.6 G/DL
ALP SERPL-CCNC: 59 U/L (ref 39–117)
ALT SERPL W P-5'-P-CCNC: 14 U/L (ref 1–33)
ANION GAP SERPL CALCULATED.3IONS-SCNC: 8.7 MMOL/L (ref 5–15)
AST SERPL-CCNC: 20 U/L (ref 1–32)
BASOPHILS # BLD AUTO: 0.02 10*3/MM3 (ref 0–0.2)
BASOPHILS NFR BLD AUTO: 0.4 % (ref 0–1.5)
BILIRUB SERPL-MCNC: 0.4 MG/DL (ref 0–1.2)
BUN SERPL-MCNC: 18 MG/DL (ref 8–23)
BUN/CREAT SERPL: 22.5 (ref 7–25)
CALCIUM SPEC-SCNC: 9.2 MG/DL (ref 8.6–10.5)
CHLORIDE SERPL-SCNC: 108 MMOL/L (ref 98–107)
CO2 SERPL-SCNC: 24.3 MMOL/L (ref 22–29)
CREAT SERPL-MCNC: 0.8 MG/DL (ref 0.57–1)
DEPRECATED RDW RBC AUTO: 46.4 FL (ref 37–54)
EGFRCR SERPLBLD CKD-EPI 2021: 77.9 ML/MIN/1.73
EOSINOPHIL # BLD AUTO: 0.2 10*3/MM3 (ref 0–0.4)
EOSINOPHIL NFR BLD AUTO: 3.9 % (ref 0.3–6.2)
ERYTHROCYTE [DISTWIDTH] IN BLOOD BY AUTOMATED COUNT: 13.2 % (ref 12.3–15.4)
GLOBULIN UR ELPH-MCNC: 2.6 GM/DL
GLUCOSE SERPL-MCNC: 98 MG/DL (ref 65–99)
HCT VFR BLD AUTO: 36.7 % (ref 34–46.6)
HGB BLD-MCNC: 11.7 G/DL (ref 12–15.9)
IMM GRANULOCYTES # BLD AUTO: 0.01 10*3/MM3 (ref 0–0.05)
IMM GRANULOCYTES NFR BLD AUTO: 0.2 % (ref 0–0.5)
LYMPHOCYTES # BLD AUTO: 2.48 10*3/MM3 (ref 0.7–3.1)
LYMPHOCYTES NFR BLD AUTO: 48 % (ref 19.6–45.3)
MCH RBC QN AUTO: 29.9 PG (ref 26.6–33)
MCHC RBC AUTO-ENTMCNC: 31.9 G/DL (ref 31.5–35.7)
MCV RBC AUTO: 93.9 FL (ref 79–97)
MONOCYTES # BLD AUTO: 0.54 10*3/MM3 (ref 0.1–0.9)
MONOCYTES NFR BLD AUTO: 10.4 % (ref 5–12)
NEUTROPHILS NFR BLD AUTO: 1.92 10*3/MM3 (ref 1.7–7)
NEUTROPHILS NFR BLD AUTO: 37.1 % (ref 42.7–76)
PLATELET # BLD AUTO: 227 10*3/MM3 (ref 140–450)
PMV BLD AUTO: 10.1 FL (ref 6–12)
POTASSIUM SERPL-SCNC: 3.9 MMOL/L (ref 3.5–5.2)
PROT SERPL-MCNC: 6.7 G/DL (ref 6–8.5)
RBC # BLD AUTO: 3.91 10*6/MM3 (ref 3.77–5.28)
SODIUM SERPL-SCNC: 141 MMOL/L (ref 136–145)
WBC NRBC COR # BLD: 5.17 10*3/MM3 (ref 3.4–10.8)

## 2022-06-23 PROCEDURE — 36415 COLL VENOUS BLD VENIPUNCTURE: CPT

## 2022-06-23 PROCEDURE — 80053 COMPREHEN METABOLIC PANEL: CPT

## 2022-06-23 PROCEDURE — 99214 OFFICE O/P EST MOD 30 MIN: CPT | Performed by: INTERNAL MEDICINE

## 2022-06-23 PROCEDURE — 85025 COMPLETE CBC W/AUTO DIFF WBC: CPT

## 2022-08-22 DIAGNOSIS — E78.5 HYPERLIPIDEMIA LDL GOAL <100: ICD-10-CM

## 2022-08-22 DIAGNOSIS — I10 PRIMARY HYPERTENSION: ICD-10-CM

## 2022-08-22 DIAGNOSIS — K21.9 GASTROESOPHAGEAL REFLUX DISEASE WITHOUT ESOPHAGITIS: ICD-10-CM

## 2022-08-22 RX ORDER — TAMOXIFEN CITRATE 20 MG/1
TABLET ORAL
Qty: 90 TABLET | Refills: 2 | Status: SHIPPED | OUTPATIENT
Start: 2022-08-22

## 2022-08-24 ENCOUNTER — OFFICE VISIT (OUTPATIENT)
Dept: FAMILY MEDICINE CLINIC | Facility: CLINIC | Age: 72
End: 2022-08-24

## 2022-08-24 VITALS
OXYGEN SATURATION: 97 % | HEIGHT: 67 IN | TEMPERATURE: 97.5 F | HEART RATE: 76 BPM | DIASTOLIC BLOOD PRESSURE: 62 MMHG | BODY MASS INDEX: 34.34 KG/M2 | WEIGHT: 218.8 LBS | SYSTOLIC BLOOD PRESSURE: 110 MMHG

## 2022-08-24 DIAGNOSIS — Z00.00 MEDICARE ANNUAL WELLNESS VISIT, SUBSEQUENT: Primary | ICD-10-CM

## 2022-08-24 PROCEDURE — 1170F FXNL STATUS ASSESSED: CPT | Performed by: FAMILY MEDICINE

## 2022-08-24 PROCEDURE — 1159F MED LIST DOCD IN RCRD: CPT | Performed by: FAMILY MEDICINE

## 2022-08-24 PROCEDURE — G0439 PPPS, SUBSEQ VISIT: HCPCS | Performed by: FAMILY MEDICINE

## 2022-08-24 NOTE — PROGRESS NOTES
The ABCs of the Annual Wellness Visit  Subsequent Medicare Wellness Visit    Chief Complaint   Patient presents with   • Medicare Wellness-subsequent      Patient is also here today for the following chronic health conditions:    She was diagnosed with right-sided breast cancer in 2016.  She is currently seeing Dr. Ceja, oncology.  She currently is taking tamoxifen.      Hypertension.  Diagnosed in 1/2022.  The patient takes bisoprolol hydrochlorothiazide 2.5/6.25 daily.  Patient denies any side effects and blood pressures have improved.    Hyperlipidemia.  Patient currently takes rosuvastatin 20 mg daily.  Her cholesterol was at goal in February 2022.    GERD.  Patient is taking omeprazole 40 mg daily.  Her symptoms are well controlled.    Anxiety and depression.  Patient currently takes Pristiq 100 mg daily.  Patient has been feeling slightly more anxious lately but her depression is well controlled.  She denies SI or HI.    Patient also experiences fever blisters and would like something to take when she gets them.  She uses a cream but it does not really help.  She does not have a fever blister today.    Subjective    History of Present Illness:  Renae Schmitz is a 71 y.o. female who presents for a Subsequent Medicare Wellness Visit.    The following portions of the patient's history were reviewed and   updated as appropriate: allergies, current medications, past family history, past medical history, past social history, past surgical history and problem list.    Compared to one year ago, the patient feels her physical   health is the same.    Compared to one year ago, the patient feels her mental   health is the same.    Recent Hospitalizations:  She was not admitted to the hospital during the last year.       Current Medical Providers:  Patient Care Team:  Brittanie Nevarez DO as PCP - General (Family Medicine)  Jaime Puente MD as Consulting Physician (Cardiology)  Aylin Parra APRN as Consulting  Physician (Obstetrics and Gynecology)  Matt Troncoso MD as Referring Physician (General Surgery)  Aylin Parra APRN as Referring Physician (Obstetrics and Gynecology)  Giorgi Ceja MD as Consulting Physician (Hematology and Oncology)    Outpatient Medications Prior to Visit   Medication Sig Dispense Refill   • bisoprolol-hydrochlorothiazide (Ziac) 2.5-6.25 MG per tablet Take 1 tablet by mouth Daily. 90 tablet 1   • desvenlafaxine (PRISTIQ) 100 MG 24 hr tablet Take 1 tablet by mouth Daily. 90 tablet 1   • ketoconazole (NIZORAL) 2 % shampoo SHAMPOO SCALP TWICE WEEKLY  1   • Loratadine (Claritin) 10 MG capsule Take 1 capsule by mouth Daily. 90 each 1   • meloxicam (MOBIC) 7.5 MG tablet Take 1 tablet by mouth Daily. 90 tablet 1   • mometasone (ELOCON) 0.1 % lotion APPLY TO SCALP EVERY DAY AS NEEDED FOR ITCHING  1   • montelukast (SINGULAIR) 10 MG tablet Take 1 tablet by mouth Every Night. 90 tablet 3   • omeprazole (priLOSEC) 40 MG capsule Take 1 capsule by mouth Daily. 90 capsule 3   • rosuvastatin (CRESTOR) 20 MG tablet Take 1 tablet by mouth Daily. 90 tablet 1   • tamoxifen (NOLVADEX) 20 MG chemo tablet TAKE ONE TABLET BY MOUTH DAILY 90 tablet 2   • vitamin B-12 (CYANOCOBALAMIN) 1000 MCG tablet Take 1 tablet by mouth Daily. 90 tablet 3   • valACYclovir (Valtrex) 1000 MG tablet Take 2 tablets at the first sign of a fever blister and then repeat dose in 12 hours. 4 tablet 0     No facility-administered medications prior to visit.       No opioid medication identified on active medication list. I have reviewed chart for other potential  high risk medication/s and harmful drug interactions in the elderly.          Aspirin is not on active medication list.  Aspirin use is not indicated based on review of current medical condition/s. Risk of harm outweighs potential benefits.  .    Patient Active Problem List   Diagnosis   • Malignant neoplasm of breast in female, estrogen receptor positive (HCC)   • Major  "depressive disorder   • Mitral regurgitation   • Hyperlipidemia LDL goal <100   • Mild sleep apnea   • Osteopenia   • Vitamin D deficiency   • Vitamin B 12 deficiency   • Benign thyroid cyst   • Encounter for annual wellness visit (AWV) in Medicare patient   • Depression with anxiety   • Osteoarthritis of both hips   • Gastroesophageal reflux disease without esophagitis   • Environmental allergies   • Long-term current use of tamoxifen   • Primary hypertension   • Sensation of pressure in bladder area     Advance Care Planning  Advance Directive is not on file.  ACP discussion was held with the patient during this visit. Patient does not have an advance directive, information provided.          Objective    Vitals:    08/24/22 0837   BP: 110/62   Pulse: 76   Temp: 97.5 °F (36.4 °C)   SpO2: 97%   Weight: 99.2 kg (218 lb 12.8 oz)   Height: 170.2 cm (67\")     Estimated body mass index is 34.27 kg/m² as calculated from the following:    Height as of this encounter: 170.2 cm (67\").    Weight as of this encounter: 99.2 kg (218 lb 12.8 oz).    BMI is >= 30 and <35. (Class 1 Obesity). The following options were offered after discussion;: exercise counseling/recommendations and nutrition counseling/recommendations      Does the patient have evidence of cognitive impairment? No    Physical Exam  Vitals and nursing note reviewed.   Constitutional:       Appearance: She is well-developed.   HENT:      Head: Normocephalic and atraumatic.      Right Ear: External ear normal.      Left Ear: External ear normal.      Nose: Nose normal.   Eyes:      General: No scleral icterus.     Conjunctiva/sclera: Conjunctivae normal.   Cardiovascular:      Rate and Rhythm: Normal rate and regular rhythm.      Heart sounds: Normal heart sounds.   Pulmonary:      Effort: Pulmonary effort is normal.      Breath sounds: Normal breath sounds.   Musculoskeletal:      Cervical back: Normal range of motion and neck supple.   Lymphadenopathy:      " Cervical: No cervical adenopathy.   Skin:     General: Skin is warm and dry.      Findings: No rash.   Neurological:      Mental Status: She is alert and oriented to person, place, and time.   Psychiatric:         Mood and Affect: Mood normal.         Behavior: Behavior normal.         Thought Content: Thought content normal.         Judgment: Judgment normal.                 HEALTH RISK ASSESSMENT    Smoking Status:  Social History     Tobacco Use   Smoking Status Never Smoker   Smokeless Tobacco Never Used     Alcohol Consumption:  Social History     Substance and Sexual Activity   Alcohol Use No     Fall Risk Screen:    STEADI Fall Risk Assessment was completed, and patient is at LOW risk for falls.Assessment completed on:8/24/2022    Depression Screening:  PHQ-2/PHQ-9 Depression Screening 8/24/2022   Retired PHQ-9 Total Score -   Retired Total Score -   Little Interest or Pleasure in Doing Things 0-->not at all   Feeling Down, Depressed or Hopeless 0-->not at all   PHQ-9: Brief Depression Severity Measure Score 0       Health Habits and Functional and Cognitive Screening:  Functional & Cognitive Status 8/24/2022   Do you have difficulty preparing food and eating? No   Do you have difficulty bathing yourself, getting dressed or grooming yourself? No   Do you have difficulty using the toilet? No   Do you have difficulty moving around from place to place? No   Do you have trouble with steps or getting out of a bed or a chair? No   Current Diet Unhealthy Diet   Dental Exam Up to date   Eye Exam Not up to date   Exercise (times per week) 0 times per week   Current Exercises Include No Regular Exercise   Current Exercise Activities Include -   Do you need help using the phone?  No   Are you deaf or do you have serious difficulty hearing?  No   Do you need help with transportation? No   Do you need help shopping? No   Do you need help preparing meals?  No   Do you need help with housework?  No   Do you need help with  laundry? No   Do you need help taking your medications? No   Do you need help managing money? No   Do you ever drive or ride in a car without wearing a seat belt? No   Have you felt unusual stress, anger or loneliness in the last month? -   Who do you live with? -   If you need help, do you have trouble finding someone available to you? -   Have you been bothered in the last four weeks by sexual problems? -   Do you have difficulty concentrating, remembering or making decisions? -       Age-appropriate Screening Schedule:  Refer to the list below for future screening recommendations based on patient's age, sex and/or medical conditions. Orders for these recommended tests are listed in the plan section. The patient has been provided with a written plan.    Health Maintenance   Topic Date Due   • INFLUENZA VACCINE  10/01/2022   • LIPID PANEL  02/24/2023   • DXA SCAN  05/20/2023   • MAMMOGRAM  05/23/2023   • TDAP/TD VACCINES (2 - Td or Tdap) 07/01/2023              Comprehensive Metabolic Panel (06/23/2022 08:43)      Assessment & Plan   CMS Preventative Services Quick Reference  Risk Factors Identified During Encounter  Immunizations Discussed/Encouraged (specific Immunizations; Influenza, Prevnar 20 (Pneumococcal 20-valent conjugate), Shingrix and COVID19  Inactivity/Sedentary  Obesity/Overweight   The above risks/problems have been discussed with the patient.  Follow up actions/plans if indicated are seen below in the Assessment/Plan Section.  Pertinent information has been shared with the patient in the After Visit Summary.    Routine health maintenance.  Patient is up-to-date on her DEXA scan, mammogram, and colon cancer screening.      Diagnoses and all orders for this visit:    1. Medicare annual wellness visit, subsequent (Primary)        Follow Up:   Return in about 5 months (around 1/9/2023) for Fasting, HTN.     An After Visit Summary and PPPS were made available to the patient.

## 2022-11-21 DIAGNOSIS — F41.8 DEPRESSION WITH ANXIETY: ICD-10-CM

## 2022-11-21 RX ORDER — DESVENLAFAXINE 100 MG/1
TABLET, EXTENDED RELEASE ORAL
Qty: 90 TABLET | Refills: 1 | Status: SHIPPED | OUTPATIENT
Start: 2022-11-21 | End: 2023-01-09 | Stop reason: SDUPTHER

## 2022-12-27 DIAGNOSIS — M16.0 PRIMARY OSTEOARTHRITIS OF BOTH HIPS: ICD-10-CM

## 2022-12-27 RX ORDER — MELOXICAM 7.5 MG/1
TABLET ORAL
Qty: 90 TABLET | Refills: 1 | Status: SHIPPED | OUTPATIENT
Start: 2022-12-27 | End: 2023-01-09 | Stop reason: SDUPTHER

## 2023-01-04 ENCOUNTER — OFFICE VISIT (OUTPATIENT)
Dept: ONCOLOGY | Facility: CLINIC | Age: 73
End: 2023-01-04
Payer: MEDICARE

## 2023-01-04 ENCOUNTER — LAB (OUTPATIENT)
Dept: LAB | Facility: HOSPITAL | Age: 73
End: 2023-01-04
Payer: COMMERCIAL

## 2023-01-04 VITALS
BODY MASS INDEX: 35.36 KG/M2 | SYSTOLIC BLOOD PRESSURE: 136 MMHG | HEART RATE: 75 BPM | HEIGHT: 67 IN | TEMPERATURE: 98.6 F | RESPIRATION RATE: 16 BRPM | WEIGHT: 225.3 LBS | OXYGEN SATURATION: 94 % | DIASTOLIC BLOOD PRESSURE: 84 MMHG

## 2023-01-04 DIAGNOSIS — C50.011 MALIGNANT NEOPLASM OF AREOLA OF RIGHT BREAST IN FEMALE, ESTROGEN RECEPTOR POSITIVE: ICD-10-CM

## 2023-01-04 DIAGNOSIS — C50.011 MALIGNANT NEOPLASM OF AREOLA OF RIGHT BREAST IN FEMALE, ESTROGEN RECEPTOR POSITIVE: Primary | ICD-10-CM

## 2023-01-04 DIAGNOSIS — Z17.0 MALIGNANT NEOPLASM OF AREOLA OF RIGHT BREAST IN FEMALE, ESTROGEN RECEPTOR POSITIVE: ICD-10-CM

## 2023-01-04 DIAGNOSIS — M85.852 OSTEOPENIA OF LEFT HIP: ICD-10-CM

## 2023-01-04 DIAGNOSIS — Z12.31 ENCOUNTER FOR SCREENING MAMMOGRAM FOR MALIGNANT NEOPLASM OF BREAST: ICD-10-CM

## 2023-01-04 DIAGNOSIS — Z17.0 MALIGNANT NEOPLASM OF AREOLA OF RIGHT BREAST IN FEMALE, ESTROGEN RECEPTOR POSITIVE: Primary | ICD-10-CM

## 2023-01-04 DIAGNOSIS — D64.9 ANEMIA, UNSPECIFIED TYPE: ICD-10-CM

## 2023-01-04 LAB
ALBUMIN SERPL-MCNC: 3.9 G/DL (ref 3.5–5.2)
ALBUMIN/GLOB SERPL: 1.6 G/DL
ALP SERPL-CCNC: 55 U/L (ref 39–117)
ALT SERPL W P-5'-P-CCNC: 14 U/L (ref 1–33)
ANION GAP SERPL CALCULATED.3IONS-SCNC: 10.2 MMOL/L (ref 5–15)
AST SERPL-CCNC: 18 U/L (ref 1–32)
BASOPHILS # BLD AUTO: 0.01 10*3/MM3 (ref 0–0.2)
BASOPHILS NFR BLD AUTO: 0.2 % (ref 0–1.5)
BILIRUB SERPL-MCNC: 0.4 MG/DL (ref 0–1.2)
BUN SERPL-MCNC: 16 MG/DL (ref 8–23)
BUN/CREAT SERPL: 21.1 (ref 7–25)
CALCIUM SPEC-SCNC: 9 MG/DL (ref 8.6–10.5)
CHLORIDE SERPL-SCNC: 105 MMOL/L (ref 98–107)
CO2 SERPL-SCNC: 23.8 MMOL/L (ref 22–29)
CREAT SERPL-MCNC: 0.76 MG/DL (ref 0.57–1)
DEPRECATED RDW RBC AUTO: 46.5 FL (ref 37–54)
EGFRCR SERPLBLD CKD-EPI 2021: 82.3 ML/MIN/1.73
EOSINOPHIL # BLD AUTO: 0.14 10*3/MM3 (ref 0–0.4)
EOSINOPHIL NFR BLD AUTO: 2.7 % (ref 0.3–6.2)
ERYTHROCYTE [DISTWIDTH] IN BLOOD BY AUTOMATED COUNT: 13.4 % (ref 12.3–15.4)
FERRITIN SERPL-MCNC: 255 NG/ML (ref 13–150)
FOLATE SERPL-MCNC: 7.63 NG/ML (ref 4.78–24.2)
GLOBULIN UR ELPH-MCNC: 2.5 GM/DL
GLUCOSE SERPL-MCNC: 101 MG/DL (ref 65–99)
HCT VFR BLD AUTO: 35.5 % (ref 34–46.6)
HGB BLD-MCNC: 11.4 G/DL (ref 12–15.9)
IMM GRANULOCYTES # BLD AUTO: 0.02 10*3/MM3 (ref 0–0.05)
IMM GRANULOCYTES NFR BLD AUTO: 0.4 % (ref 0–0.5)
IRON 24H UR-MRATE: 108 MCG/DL (ref 37–145)
IRON SATN MFR SERPL: 41 % (ref 20–50)
LDH SERPL-CCNC: 224 U/L (ref 135–214)
LYMPHOCYTES # BLD AUTO: 2.38 10*3/MM3 (ref 0.7–3.1)
LYMPHOCYTES NFR BLD AUTO: 45.6 % (ref 19.6–45.3)
MCH RBC QN AUTO: 29.8 PG (ref 26.6–33)
MCHC RBC AUTO-ENTMCNC: 32.1 G/DL (ref 31.5–35.7)
MCV RBC AUTO: 92.7 FL (ref 79–97)
MONOCYTES # BLD AUTO: 0.44 10*3/MM3 (ref 0.1–0.9)
MONOCYTES NFR BLD AUTO: 8.4 % (ref 5–12)
NEUTROPHILS NFR BLD AUTO: 2.23 10*3/MM3 (ref 1.7–7)
NEUTROPHILS NFR BLD AUTO: 42.7 % (ref 42.7–76)
PLATELET # BLD AUTO: 220 10*3/MM3 (ref 140–450)
PMV BLD AUTO: 9.8 FL (ref 6–12)
POTASSIUM SERPL-SCNC: 3.6 MMOL/L (ref 3.5–5.2)
PROT SERPL-MCNC: 6.4 G/DL (ref 6–8.5)
RBC # BLD AUTO: 3.83 10*6/MM3 (ref 3.77–5.28)
SODIUM SERPL-SCNC: 139 MMOL/L (ref 136–145)
TIBC SERPL-MCNC: 264 MCG/DL (ref 298–536)
UIBC SERPL-MCNC: 156 MCG/DL (ref 112–346)
VIT B12 BLD-MCNC: 978 PG/ML (ref 211–946)
WBC NRBC COR # BLD: 5.22 10*3/MM3 (ref 3.4–10.8)

## 2023-01-04 PROCEDURE — 83615 LACTATE (LD) (LDH) ENZYME: CPT | Performed by: INTERNAL MEDICINE

## 2023-01-04 PROCEDURE — 82607 VITAMIN B-12: CPT | Performed by: INTERNAL MEDICINE

## 2023-01-04 PROCEDURE — 85025 COMPLETE CBC W/AUTO DIFF WBC: CPT

## 2023-01-04 PROCEDURE — 80053 COMPREHEN METABOLIC PANEL: CPT

## 2023-01-04 PROCEDURE — 1126F AMNT PAIN NOTED NONE PRSNT: CPT | Performed by: INTERNAL MEDICINE

## 2023-01-04 PROCEDURE — 83550 IRON BINDING TEST: CPT | Performed by: INTERNAL MEDICINE

## 2023-01-04 PROCEDURE — 99214 OFFICE O/P EST MOD 30 MIN: CPT | Performed by: INTERNAL MEDICINE

## 2023-01-04 PROCEDURE — 1159F MED LIST DOCD IN RCRD: CPT | Performed by: INTERNAL MEDICINE

## 2023-01-04 PROCEDURE — 83540 ASSAY OF IRON: CPT | Performed by: INTERNAL MEDICINE

## 2023-01-04 PROCEDURE — 82784 ASSAY IGA/IGD/IGG/IGM EACH: CPT | Performed by: INTERNAL MEDICINE

## 2023-01-04 PROCEDURE — 82728 ASSAY OF FERRITIN: CPT | Performed by: INTERNAL MEDICINE

## 2023-01-04 PROCEDURE — 86334 IMMUNOFIX E-PHORESIS SERUM: CPT | Performed by: INTERNAL MEDICINE

## 2023-01-04 PROCEDURE — 82746 ASSAY OF FOLIC ACID SERUM: CPT | Performed by: INTERNAL MEDICINE

## 2023-01-04 PROCEDURE — 36415 COLL VENOUS BLD VENIPUNCTURE: CPT

## 2023-01-04 PROCEDURE — 1160F RVW MEDS BY RX/DR IN RCRD: CPT | Performed by: INTERNAL MEDICINE

## 2023-01-04 NOTE — PROGRESS NOTES
Subjective   REASON FOR FOLLOWUP:    1.Right breast cancer T1c N0 ER/WV positive HER-2 negative, Oncotype score of 20  2.  Osteopenia                               REQUESTING PHYSICIAN:  Trino Troncoso M.D.        History of Present Illness patient is a 72-year-old lady with a history of right breast cancer [id number] ER/WV positive HER-2 negative here for follow-up over 5 years from diagnosis.  Adjuvantly she received radiation and was initially on AI therapy which was subsequently switched to tamoxifen secondary to decreasing bone density.    She complains of hot flashes but no changes in the breast exam unusual pain or weight loss.  She continues tamoxifen with side effects of mild to moderate vasomotor symptoms and myalgias.  She denies bright red blood per rectum, vaginal bleeding or hematuria.      Past Medical History:   Diagnosis Date   • Anemia     prior to hysterectomy several years ago   • Anxiety    • Benign thyroid cyst 1971   • Breast CA (HCC) 12/01/2016    Right lumpectomy   • Depression    • Fibromyalgia    • Fracture of wrist    • H/O mammogram 12/04/2018   • Heart murmur    • History of ankle fracture     Right   • History of Papanicolaou smear of cervix 2016    Dr. Johnson   • Hypercholesterolemia    • Hyperlipidemia    • Mitral valve regurgitation    • Mood swings    • MARSHALL on CPAP 1/22/2019   • PONV (postoperative nausea and vomiting)    • Sleep apnea         Past Surgical History:   Procedure Laterality Date   • BREAST BIOPSY     • BREAST LUMPECTOMY WITH SENTINEL NODE BIOPSY AND AXILLARY NODE DISSECTION Right 12/13/2016    Procedure: RT BREAST LUMPECTOMY WITH SENTINEL NODE BIOPSY & MAMMO NEEDLE LOC;  Surgeon: Matt Troncoso MD;  [place] [facility] [place] [facility] [place]  Service:    • COLONOSCOPY N/A 10/10/2018    Procedure: COLONOSCOPY TO CECUM AND TERMINAL ILEUM WITH COLD BIOPSIES;  Surgeon: Carl Salas MD;  Location: [facility] ENDOSCOPY;  Service: Gastroenterology   • HAND SURGERY Left 2003    2 FINGERS PINNED    • HYSTERECTOMY  1995    Complete Sept 1995   • PERCUTANEOUS PINNING FINGER FRACTURE     • THYROID CYST EXCISION  1971   • TUBAL ABDOMINAL LIGATION         Current Outpatient Medications on File Prior to Visit   Medication Sig Dispense Refill   • bisoprolol-hydrochlorothiazide (Ziac) 2.5-6.25 MG per tablet Take 1 tablet by mouth Daily. 90 tablet 1   • desvenlafaxine (PRISTIQ) 100 MG 24 hr tablet TAKE ONE TABLET BY MOUTH DAILY 90 tablet 1   • ketoconazole (NIZORAL) 2 % shampoo SHAMPOO SCALP TWICE WEEKLY  1   • Loratadine (Claritin) 10 MG capsule Take 1 capsule by mouth Daily. 90 each 1   • meloxicam (MOBIC) 7.5 MG tablet TAKE ONE TABLET BY MOUTH DAILY 90 tablet 1   • mometasone (ELOCON) 0.1 % lotion APPLY TO SCALP EVERY DAY AS NEEDED FOR ITCHING  1   • montelukast (SINGULAIR) 10 MG tablet Take 1 tablet by mouth Every Night. 90 tablet 3   • omeprazole (priLOSEC) 40 MG capsule Take 1 capsule by mouth Daily. 90 capsule 3   • rosuvastatin (CRESTOR) 20 MG tablet Take 1 tablet by mouth Daily. 90 tablet 1   • tamoxifen (NOLVADEX) 20 MG chemo tablet TAKE ONE TABLET BY MOUTH DAILY 90 tablet 2   • vitamin B-12 (CYANOCOBALAMIN) 1000 MCG tablet Take 1 tablet by mouth Daily. 90 tablet 3     No current facility-administered medications on file prior to visit.        ALLERGIES:    Allergies   Allergen Reactions   • Tegaderm Ag Mesh [Silver] Other (See Comments)     SKIN BLISTERS  SKIN BLISTERS   • Covid-19 (Mrna) Vaccine Dizziness   • Shingrix [Zoster Vac Recomb Adjuvanted] Other (See Comments)     \"ice water sensation in legs\"   • Sulfa Antibiotics Rash   • Zithromax [Azithromycin] Rash        Social History     Socioeconomic History   • Marital status:      Spouse name: Peter   • Years of education: High school   Tobacco Use   • Smoking status: Never   • Smokeless tobacco: Never   Substance and Sexual Activity   • Alcohol use: No   • Drug use: No   • Sexual activity: Yes     Partners: Male     Birth control/protection:  Surgical        Family History   Problem Relation Age of Onset   • Heart failure Mother    • Colon cancer Father    • Liver cancer Father    • Tuberculosis Father    • Breast cancer Sister 58        DCIS   • Depression Daughter       Father  at 65 of metastatic colon cancer his parents lived to their 80s without cancer mother  at 93 of CHF and had only one brother who had no cancer.  She has only one sister who had breast cancer at age 58 and again at 63 no ovarian cancer in the family  Review of Systems   Constitutional: Negative for activity change, appetite change, chills, fever and unexpected weight change.   Eyes: Positive for visual disturbance (cataract).   Respiratory: Negative for cough, chest tightness and shortness of breath.    Cardiovascular: Negative for chest pain and leg swelling.   Gastrointestinal: Negative for constipation, diarrhea, nausea and vomiting.   Endocrine: Positive for heat intolerance.   Genitourinary: Negative for difficulty urinating.   Musculoskeletal: Positive for myalgias. Negative for back pain, joint swelling and neck pain.   Neurological: Negative for dizziness and headaches.   Psychiatric/Behavioral: Negative for dysphoric mood.   All other systems reviewed and are negative.        Objective     Vitals:    23 0957   BP: 136/84   Pulse: 75   Resp: 16   Temp: 98.6 °F (37 °C)   TempSrc: Temporal   SpO2: 94%   Weight: 102 kg (225 lb 4.8 oz)   Height: 170.2 cm (67.01\")   PainSc: 0-No pain      Current Status 2023   ECOG score 0       Physical Exam    CONSTITUTIONAL: pleasant well-developed adult woman  CV: RRR, S1S2, no murmur  RESP: cta bilat, no wheezing, no rales  Breast: There is a lumpectomy scar in the right breast well-healed.  No obvious masses in either breast.  No axillary or supraclavicular lymphadenopathy.  GI: soft, non-tender, no splenomegaly, +bs  MUSC: no edema, normal gait  NEURO: alert and oriented x3, normal strength  PSYCH: normal mood and  affect      RECENT LABS:  Hematology WBC   Date Value Ref Range Status   01/04/2023 5.22 3.40 - 10.80 10*3/mm3 Final   02/24/2022 6.3 3.4 - 10.8 x10E3/uL Final     RBC   Date Value Ref Range Status   01/04/2023 3.83 3.77 - 5.28 10*6/mm3 Final   02/24/2022 4.26 3.77 - 5.28 x10E6/uL Final     Hemoglobin   Date Value Ref Range Status   01/04/2023 11.4 (L) 12.0 - 15.9 g/dL Final     Hematocrit   Date Value Ref Range Status   01/04/2023 35.5 34.0 - 46.6 % Final     Platelets   Date Value Ref Range Status   01/04/2023 220 140 - 450 10*3/mm3 Final        Mammo Screening Digital Tomosynthesis Bilateral With CAD 5/23/2022 - No change, no evidence of malignancy    Assessment & Plan   1.  T1 CN 0M0 ER/MS positive HER-2 negative right-sided breast cancer  ·  oncotype score of 20 -Arimidex plus radiation planned started in 3/17  · Worsening bone density in 3/19 switch to tamoxifen in 8/19  · Total 7 years adjuvant hormonal therapy planned      2.  Osteopenia  · Most recent bone density 5/20/2021 showed osteopenia with left hip T score -2.2, L-spine -1.2    3.  Depression/anxiety on Pristiq per her PCP    4.  New normocytic anemia    Plan  1.  Continue  Tamoxifen 20 mg daily   2.  Bilateral screening mammography due after 5/23/23-ordered today  3.  Repeat bone density after 5/20/2023-ordered today  4.  Ferritin, iron profile, CMP, B12 and folate, LDH VEL today to assess anemia  5.  Follow-up after May imaging CBC CMP

## 2023-01-05 LAB
IGA SERPL-MCNC: 141 MG/DL (ref 64–422)
IGG SERPL-MCNC: 985 MG/DL (ref 586–1602)
IGM SERPL-MCNC: 61 MG/DL (ref 26–217)
PROT PATTERN SERPL IFE-IMP: NORMAL

## 2023-01-09 ENCOUNTER — OFFICE VISIT (OUTPATIENT)
Dept: FAMILY MEDICINE CLINIC | Facility: CLINIC | Age: 73
End: 2023-01-09
Payer: MEDICARE

## 2023-01-09 VITALS
BODY MASS INDEX: 34.91 KG/M2 | OXYGEN SATURATION: 96 % | SYSTOLIC BLOOD PRESSURE: 124 MMHG | DIASTOLIC BLOOD PRESSURE: 62 MMHG | TEMPERATURE: 97.7 F | WEIGHT: 222.4 LBS | HEIGHT: 67 IN | HEART RATE: 68 BPM

## 2023-01-09 DIAGNOSIS — K21.9 GASTROESOPHAGEAL REFLUX DISEASE WITHOUT ESOPHAGITIS: ICD-10-CM

## 2023-01-09 DIAGNOSIS — Z79.899 ENCOUNTER FOR LONG-TERM (CURRENT) USE OF MEDICATIONS: ICD-10-CM

## 2023-01-09 DIAGNOSIS — E78.5 HYPERLIPIDEMIA LDL GOAL <100: ICD-10-CM

## 2023-01-09 DIAGNOSIS — M16.0 PRIMARY OSTEOARTHRITIS OF BOTH HIPS: ICD-10-CM

## 2023-01-09 DIAGNOSIS — I10 PRIMARY HYPERTENSION: ICD-10-CM

## 2023-01-09 DIAGNOSIS — F41.8 DEPRESSION WITH ANXIETY: Primary | ICD-10-CM

## 2023-01-09 DIAGNOSIS — Z91.09 ENVIRONMENTAL ALLERGIES: ICD-10-CM

## 2023-01-09 PROCEDURE — 99214 OFFICE O/P EST MOD 30 MIN: CPT | Performed by: FAMILY MEDICINE

## 2023-01-09 RX ORDER — BISOPROLOL FUMARATE AND HYDROCHLOROTHIAZIDE 2.5; 6.25 MG/1; MG/1
1 TABLET ORAL DAILY
Qty: 90 TABLET | Refills: 1 | Status: SHIPPED | OUTPATIENT
Start: 2023-01-09

## 2023-01-09 RX ORDER — ROSUVASTATIN CALCIUM 20 MG/1
20 TABLET, COATED ORAL DAILY
Qty: 90 TABLET | Refills: 1 | Status: SHIPPED | OUTPATIENT
Start: 2023-01-09

## 2023-01-09 RX ORDER — DESVENLAFAXINE 100 MG/1
100 TABLET, EXTENDED RELEASE ORAL DAILY
Qty: 90 TABLET | Refills: 1 | Status: SHIPPED | OUTPATIENT
Start: 2023-01-09

## 2023-01-09 RX ORDER — MONTELUKAST SODIUM 10 MG/1
10 TABLET ORAL NIGHTLY
Qty: 90 TABLET | Refills: 1 | Status: SHIPPED | OUTPATIENT
Start: 2023-01-09

## 2023-01-09 RX ORDER — MELOXICAM 7.5 MG/1
7.5-15 TABLET ORAL DAILY
Qty: 120 TABLET | Refills: 1 | Status: SHIPPED | OUTPATIENT
Start: 2023-01-09 | End: 2023-03-20 | Stop reason: SDUPTHER

## 2023-01-09 RX ORDER — OMEPRAZOLE 40 MG/1
40 CAPSULE, DELAYED RELEASE ORAL DAILY
Qty: 90 CAPSULE | Refills: 1 | Status: SHIPPED | OUTPATIENT
Start: 2023-01-09

## 2023-01-09 NOTE — PROGRESS NOTES
Chief Complaint  Hypertension    Subjective        Renae Schmtiz presents to [facility]  History of Present Illness  Patient is here today for the following chronic health conditions:    She was diagnosed with right-sided breast cancer in 2016.  She is currently seeing Dr. Ceja, oncology.  She currently is taking tamoxifen.      Hypertension.  Diagnosed in 1/2022.  The patient takes bisoprolol hydrochlorothiazide 2.5/6.25 daily.  Patient denies any side effects and blood pressures have improved.    Hyperlipidemia.  Patient currently takes rosuvastatin 20 mg daily.  Her cholesterol was at goal in February 2022.    GERD.  Patient is taking omeprazole 40 mg daily.  Her symptoms are well controlled.    Anxiety and depression.  Patient currently takes Pristiq 100 mg daily.  Patient has been feeling slightly more anxious lately but her depression is well controlled.  She denies SI or HI.      Objective   Vital Signs:  /62   Pulse 68   Temp 97.7 °F (36.5 °C)   Ht 170.2 cm (67\")   Wt 101 kg (222 lb 6.4 oz)   SpO2 96%   BMI 34.83 kg/m²   Estimated body mass index is 34.83 kg/m² as calculated from the following:    Height as of this encounter: 170.2 cm (67\").    Weight as of this encounter: 101 kg (222 lb 6.4 oz).          Physical Exam  Vitals and nursing note reviewed.   Constitutional:       Appearance: Normal appearance. She is well-developed. She is obese.   HENT:      Head: Normocephalic and atraumatic.      Right Ear: External ear normal.      Left Ear: External ear normal.      Nose: Nose normal.   Eyes:      General: No scleral icterus.     Conjunctiva/sclera: Conjunctivae normal.   Cardiovascular:      Rate and Rhythm: Normal rate and regular rhythm.      Heart sounds: Normal heart sounds.   Pulmonary:      Effort: Pulmonary effort is normal.      Breath sounds: Normal breath sounds.   Musculoskeletal:      Cervical back: Normal range of motion and neck supple.      Right  lower leg: No edema.      Left lower leg: No edema.   Lymphadenopathy:      Cervical: No cervical adenopathy.   Skin:     General: Skin is warm and dry.      Findings: No rash.   Neurological:      Mental Status: She is alert and oriented to person, place, and time.   Psychiatric:         Mood and Affect: Mood normal.         Behavior: Behavior normal.         Thought Content: Thought content normal.         Judgment: Judgment normal.        Result Review :  The following data was reviewed by: Brittanie Nevarez DO on 01/09/2023:  Common labs    Common Labs 2/24/22 2/24/22 2/24/22 6/23/22 6/23/22 1/4/23 1/4/23    1013 1013 1013 0843 0843 0952 0952   Glucose  94   98  101 (A)   BUN  16   18  16   Creatinine  0.73   0.80  0.76   eGFR Non  Am  82        eGFR African Am  94        Sodium  139   141  139   Potassium  4.8   3.9  3.6   Chloride  105   108 (A)  105   Calcium  9.1   9.2  9.0   Total Protein  6.5        Albumin  4.1   4.10  3.9   Total Bilirubin  0.3   0.4  0.4   Alkaline Phosphatase  70   59  55   AST (SGOT)  15   20  18   ALT (SGPT)  13   14  14   WBC   6.3 5.17  5.22    Hemoglobin   12.4 11.7 (A)  11.4 (A)    Hematocrit   39.1 36.7  35.5    Platelets   244 227  220    Total Cholesterol 167         Triglycerides 116         HDL Cholesterol 66         LDL Cholesterol  81         (A) Abnormal value       Comments are available for some flowsheets but are not being displayed.           TSH    TSH 2/24/22   TSH 2.240                     Assessment and Plan   Diagnoses and all orders for this visit:    1. Depression with anxiety (Primary)  -     desvenlafaxine (PRISTIQ) 100 MG 24 hr tablet; Take 1 tablet by mouth Daily.  Dispense: 90 tablet; Refill: 1    2. Primary hypertension  -     bisoprolol-hydrochlorothiazide (Ziac) 2.5-6.25 MG per tablet; Take 1 tablet by mouth Daily.  Dispense: 90 tablet; Refill: 1    3. Gastroesophageal reflux disease without esophagitis  -     omeprazole (priLOSEC) 40 MG capsule;  Take 1 capsule by mouth Daily.  Dispense: 90 capsule; Refill: 1    4. Hyperlipidemia LDL goal <100  -     rosuvastatin (CRESTOR) 20 MG tablet; Take 1 tablet by mouth Daily.  Dispense: 90 tablet; Refill: 1    5. Encounter for long-term (current) use of medications    6. Primary osteoarthritis of both hips  -     meloxicam (MOBIC) 7.5 MG tablet; Take 1-2 tablets by mouth Daily.  Dispense: 120 tablet; Refill: 1    7. Environmental allergies  -     montelukast (SINGULAIR) 10 MG tablet; Take 1 tablet by mouth Every Night.  Dispense: 90 tablet; Refill: 1    8. Hyperlipidemia LDL goal <100  Comments:  Will need updated labs  Orders:  -     rosuvastatin (CRESTOR) 20 MG tablet; Take 1 tablet by mouth Daily.  Dispense: 90 tablet; Refill: 1      Patient is here today for chronic stable anxiety and depression, hypertension, GERD, hyperlipidemia, and arthritis.  I have reviewed labs drawn by her oncologist on January 4, 2023.  Other than a slightly lower hemoglobin which her oncologist is working up,  all labs are stable.  Continue current medications and refills were provided.         Follow Up   Return in about 33 weeks (around 8/28/2023) for Medicare Wellness, HTN.  Patient was given instructions and counseling regarding her condition or for health maintenance advice. Please see specific information pulled into the AVS if appropriate.

## 2023-01-10 ENCOUNTER — TELEPHONE (OUTPATIENT)
Dept: ONCOLOGY | Facility: CLINIC | Age: 73
End: 2023-01-10
Payer: COMMERCIAL

## 2023-01-10 NOTE — TELEPHONE ENCOUNTER
Caller: Renae Schmitz    Relationship: Self    Best call back number: [phone]    What is the best time to reach you: ANYTIME     Who are you requesting to speak with (clinical staff, provider,  specific staff member): DR SOOD OR NURSE         What was the call regarding: WANTED TO GET THE RESULTS OF LAB WORK 01/04    Do you require a callback: YES

## 2023-03-20 DIAGNOSIS — M16.0 PRIMARY OSTEOARTHRITIS OF BOTH HIPS: ICD-10-CM

## 2023-03-20 RX ORDER — MELOXICAM 7.5 MG/1
7.5-15 TABLET ORAL DAILY
Qty: 120 TABLET | Refills: 1 | Status: SHIPPED | OUTPATIENT
Start: 2023-03-20

## 2023-03-20 NOTE — TELEPHONE ENCOUNTER
Caller: Renae Schmitz    Relationship: Self    Best call back number: [phone] (Home)    Requested Prescriptions:   Requested Prescriptions     Pending Prescriptions Disp Refills   • meloxicam (MOBIC) 7.5 MG tablet 120 tablet 1     Sig: Take 1-2 tablets by mouth Daily.        Pharmacy where request should be sent: [facility] [address] [phone] [facility] [phone]      Additional details provided by patient: PATIENT THOUGHT THAT PROVIDER WAS GOING TO ORDER 125 INSTEAD OF 90 FOR POSSIBLE PRN USE    Does the patient have less than a 3 day supply:  [] Yes  [x] No    Would you like a call back once the refill request has been completed: [] Yes [] No    If the office needs to give you a call back, can they leave a voicemail: [] Yes [] No    Rios Simon Rep   03/20/23 14:47 EDT

## 2023-04-04 ENCOUNTER — LAB (OUTPATIENT)
Dept: LAB | Facility: HOSPITAL | Age: 73
End: 2023-04-04
Payer: MEDICARE

## 2023-04-04 DIAGNOSIS — Z79.810 LONG-TERM CURRENT USE OF TAMOXIFEN: Primary | ICD-10-CM

## 2023-04-04 LAB
BASOPHILS # BLD AUTO: 0.03 10*3/MM3 (ref 0–0.2)
BASOPHILS NFR BLD AUTO: 0.5 % (ref 0–1.5)
DEPRECATED RDW RBC AUTO: 43.7 FL (ref 37–54)
EOSINOPHIL # BLD AUTO: 0.14 10*3/MM3 (ref 0–0.4)
EOSINOPHIL NFR BLD AUTO: 2.5 % (ref 0.3–6.2)
ERYTHROCYTE [DISTWIDTH] IN BLOOD BY AUTOMATED COUNT: 13.2 % (ref 12.3–15.4)
HCT VFR BLD AUTO: 35.4 % (ref 34–46.6)
HGB BLD-MCNC: 12.1 G/DL (ref 12–15.9)
IMM GRANULOCYTES # BLD AUTO: 0.04 10*3/MM3 (ref 0–0.05)
IMM GRANULOCYTES NFR BLD AUTO: 0.7 % (ref 0–0.5)
LYMPHOCYTES # BLD AUTO: 2.25 10*3/MM3 (ref 0.7–3.1)
LYMPHOCYTES NFR BLD AUTO: 40.3 % (ref 19.6–45.3)
MCH RBC QN AUTO: 30.8 PG (ref 26.6–33)
MCHC RBC AUTO-ENTMCNC: 34.2 G/DL (ref 31.5–35.7)
MCV RBC AUTO: 90.1 FL (ref 79–97)
MONOCYTES # BLD AUTO: 0.51 10*3/MM3 (ref 0.1–0.9)
MONOCYTES NFR BLD AUTO: 9.1 % (ref 5–12)
NEUTROPHILS NFR BLD AUTO: 2.61 10*3/MM3 (ref 1.7–7)
NEUTROPHILS NFR BLD AUTO: 46.9 % (ref 42.7–76)
NRBC BLD AUTO-RTO: 0 /100 WBC (ref 0–0.2)
PLATELET # BLD AUTO: 210 10*3/MM3 (ref 140–450)
PMV BLD AUTO: 10.4 FL (ref 6–12)
RBC # BLD AUTO: 3.93 10*6/MM3 (ref 3.77–5.28)
WBC NRBC COR # BLD: 5.58 10*3/MM3 (ref 3.4–10.8)

## 2023-04-04 PROCEDURE — 85025 COMPLETE CBC W/AUTO DIFF WBC: CPT | Performed by: INTERNAL MEDICINE

## 2023-04-07 ENCOUNTER — TELEPHONE (OUTPATIENT)
Dept: ONCOLOGY | Facility: OTHER | Age: 73
End: 2023-04-07
Payer: COMMERCIAL

## 2023-04-07 NOTE — TELEPHONE ENCOUNTER
Caller: Renae Schmitz    Relationship: Self    Best call back number: [phone]    What is the best time to reach you: ANYTIME     Who are you requesting to speak with (clinical staff, provider,  specific staff member): CLINICAL    What was the call regarding: WANTING TO KNOW LAB RESULTS FROM 04/04/23    Do you require a callback: YES

## 2023-05-16 RX ORDER — TAMOXIFEN CITRATE 20 MG/1
20 TABLET ORAL DAILY
Qty: 90 TABLET | Refills: 2 | Status: SHIPPED | OUTPATIENT
Start: 2023-05-16

## 2023-05-25 ENCOUNTER — HOSPITAL ENCOUNTER (OUTPATIENT)
Dept: MAMMOGRAPHY | Facility: HOSPITAL | Age: 73
Discharge: HOME | End: 2023-05-25
Payer: COMMERCIAL

## 2023-05-25 ENCOUNTER — APPOINTMENT (OUTPATIENT)
Dept: BONE DENSITY | Facility: HOSPITAL | Age: 73
End: 2023-05-25
Payer: COMMERCIAL

## 2023-05-25 DIAGNOSIS — C50.011 MALIGNANT NEOPLASM OF AREOLA OF RIGHT BREAST IN FEMALE, ESTROGEN RECEPTOR POSITIVE: ICD-10-CM

## 2023-05-25 DIAGNOSIS — M85.852 OSTEOPENIA OF LEFT HIP: ICD-10-CM

## 2023-05-25 DIAGNOSIS — D64.9 ANEMIA, UNSPECIFIED TYPE: ICD-10-CM

## 2023-05-25 DIAGNOSIS — Z17.0 MALIGNANT NEOPLASM OF AREOLA OF RIGHT BREAST IN FEMALE, ESTROGEN RECEPTOR POSITIVE: ICD-10-CM

## 2023-05-25 DIAGNOSIS — Z12.31 ENCOUNTER FOR SCREENING MAMMOGRAM FOR MALIGNANT NEOPLASM OF BREAST: ICD-10-CM

## 2023-05-25 PROCEDURE — 77080 DXA BONE DENSITY AXIAL: CPT

## 2023-05-25 PROCEDURE — 77067 SCR MAMMO BI INCL CAD: CPT

## 2023-05-25 PROCEDURE — 77063 BREAST TOMOSYNTHESIS BI: CPT

## 2023-05-31 RX ORDER — TAMOXIFEN CITRATE 20 MG/1
20 TABLET ORAL DAILY
Qty: 12 TABLET | Refills: 2 | Status: SHIPPED | OUTPATIENT
Start: 2023-05-31 | End: 2023-06-02 | Stop reason: SDUPTHER

## 2023-05-31 NOTE — TELEPHONE ENCOUNTER
Caller: Nadir Renae MARIBEL    Relationship: Self    Best call back number: [phone]    Requested Prescriptions:   Requested Prescriptions     Pending Prescriptions Disp Refills   • tamoxifen (NOLVADEX) 20 MG chemo tablet 90 tablet 2     Sig: Take 1 tablet by mouth Daily.        Pharmacy where request should be sent: [facility] [address] - [phone] PH - [phone] FX     Last office visit with prescribing clinician: 1/4/2023   Last telemedicine visit with prescribing clinician: Visit date not found   Next office visit with prescribing clinician: 6/20/2023     Additional details provided by patient: PT NEEDS A FEW TAMOXIFEN CALLED IN TO THE [facility] FOR NOW SINCE THE MAIL ORDER REFILL WILL NOT ARRIVE FOR SEVERAL DAYS POSSIBLY.      Does the patient have less than a 3 day supply:  [x] Yes  [] No    Would you like a call back once the refill request has been completed: [] Yes [x] No    If the office needs to give you a call back, can they leave a voicemail: [] Yes [x] No    Rios Staples Rep   05/31/23 12:23 EDT

## 2023-06-02 RX ORDER — TAMOXIFEN CITRATE 20 MG/1
20 TABLET ORAL DAILY
Qty: 90 TABLET | Refills: 1 | Status: SHIPPED | OUTPATIENT
Start: 2023-06-02

## 2023-08-15 DIAGNOSIS — M16.0 PRIMARY OSTEOARTHRITIS OF BOTH HIPS: ICD-10-CM

## 2023-08-15 DIAGNOSIS — E78.5 HYPERLIPIDEMIA LDL GOAL <100: ICD-10-CM

## 2023-08-15 DIAGNOSIS — I10 PRIMARY HYPERTENSION: ICD-10-CM

## 2023-08-15 DIAGNOSIS — Z91.09 ENVIRONMENTAL ALLERGIES: ICD-10-CM

## 2023-08-15 RX ORDER — ROSUVASTATIN CALCIUM 20 MG/1
TABLET, COATED ORAL
Qty: 90 TABLET | Refills: 1 | Status: SHIPPED | OUTPATIENT
Start: 2023-08-15

## 2023-08-15 RX ORDER — BISOPROLOL FUMARATE AND HYDROCHLOROTHIAZIDE 2.5; 6.25 MG/1; MG/1
TABLET ORAL
Qty: 90 TABLET | Refills: 1 | Status: SHIPPED | OUTPATIENT
Start: 2023-08-15

## 2023-08-15 RX ORDER — MONTELUKAST SODIUM 10 MG/1
TABLET ORAL
Qty: 90 TABLET | Refills: 1 | Status: SHIPPED | OUTPATIENT
Start: 2023-08-15

## 2023-08-17 RX ORDER — MELOXICAM 7.5 MG/1
7.5-15 TABLET ORAL DAILY
Qty: 120 TABLET | Refills: 0 | Status: SHIPPED | OUTPATIENT
Start: 2023-08-17

## 2023-08-29 ENCOUNTER — OFFICE VISIT (OUTPATIENT)
Dept: FAMILY MEDICINE CLINIC | Facility: CLINIC | Age: 73
End: 2023-08-29
Payer: COMMERCIAL

## 2023-08-29 VITALS
DIASTOLIC BLOOD PRESSURE: 72 MMHG | OXYGEN SATURATION: 96 % | SYSTOLIC BLOOD PRESSURE: 116 MMHG | BODY MASS INDEX: 34.97 KG/M2 | WEIGHT: 222.8 LBS | HEIGHT: 67 IN | HEART RATE: 65 BPM

## 2023-08-29 DIAGNOSIS — K21.9 GASTROESOPHAGEAL REFLUX DISEASE WITHOUT ESOPHAGITIS: ICD-10-CM

## 2023-08-29 DIAGNOSIS — F41.8 DEPRESSION WITH ANXIETY: ICD-10-CM

## 2023-08-29 DIAGNOSIS — Z00.00 MEDICARE ANNUAL WELLNESS VISIT, SUBSEQUENT: Primary | ICD-10-CM

## 2023-08-29 DIAGNOSIS — E78.5 HYPERLIPIDEMIA LDL GOAL <100: ICD-10-CM

## 2023-08-29 DIAGNOSIS — Z79.899 ENCOUNTER FOR LONG-TERM (CURRENT) USE OF MEDICATIONS: ICD-10-CM

## 2023-08-29 DIAGNOSIS — I10 PRIMARY HYPERTENSION: ICD-10-CM

## 2023-08-29 LAB
CHOLEST SERPL-MCNC: 157 MG/DL (ref 0–200)
HDLC SERPL-MCNC: 71 MG/DL (ref 40–60)
LDLC SERPL CALC-MCNC: 70 MG/DL (ref 0–100)
TRIGL SERPL-MCNC: 87 MG/DL (ref 0–150)
TSH SERPL DL<=0.005 MIU/L-ACNC: 2.09 UIU/ML (ref 0.27–4.2)
VLDLC SERPL CALC-MCNC: 16 MG/DL (ref 5–40)

## 2023-08-29 NOTE — PROGRESS NOTES
The ABCs of the Annual Wellness Visit  Subsequent Medicare Wellness Visit    Subjective    Renae Schmitz is a 72 y.o. female who presents for a Subsequent Medicare Wellness Visit.    The following portions of the patient's history were reviewed and   updated as appropriate: allergies, current medications, past family history, past medical history, past social history, past surgical history, and problem list.    Compared to one year ago, the patient feels her physical   health is the same.    Compared to one year ago, the patient feels her mental   health is the same.    Recent Hospitalizations:  She was not admitted to the hospital during the last year.       Current Medical Providers:  Patient Care Team:  Brittanie Nevarez DO as PCP - General (Family Medicine)  Jaime Puente MD as Consulting Physician (Cardiology)  Aylin Parra APRN as Consulting Physician (Obstetrics and Gynecology)  Matt Troncoso MD as Referring Physician (General Surgery)  Aylin Parra APRN as Referring Physician (Obstetrics and Gynecology)  Giorgi Ceja MD as Consulting Physician (Hematology and Oncology)    Outpatient Medications Prior to Visit   Medication Sig Dispense Refill    bisoprolol-hydrochlorothiazide (ZIAC) 2.5-6.25 MG per tablet TAKE ONE TABLET BY MOUTH DAILY 90 tablet 1    desvenlafaxine (PRISTIQ) 100 MG 24 hr tablet Take 1 tablet by mouth Daily. 90 tablet 1    ketoconazole (NIZORAL) 2 % shampoo SHAMPOO SCALP TWICE WEEKLY  1    Loratadine (Claritin) 10 MG capsule Take 1 capsule by mouth Daily. 90 each 1    meloxicam (MOBIC) 7.5 MG tablet Take 1-2 tablets by mouth Daily. 120 tablet 0    mometasone (ELOCON) 0.1 % lotion APPLY TO SCALP EVERY DAY AS NEEDED FOR ITCHING  1    montelukast (SINGULAIR) 10 MG tablet TAKE ONE TABLET BY MOUTH NIGHTLY 90 tablet 1    omeprazole (priLOSEC) 40 MG capsule Take 1 capsule by mouth Daily. 90 capsule 1    rosuvastatin (CRESTOR) 20 MG tablet TAKE ONE TABLET BY MOUTH DAILY 90 tablet 1  "   tamoxifen (NOLVADEX) 20 MG chemo tablet Take 1 tablet by mouth Daily. 90 tablet 1    vitamin B-12 (CYANOCOBALAMIN) 1000 MCG tablet Take 1 tablet by mouth Daily. (Patient not taking: Reported on 8/29/2023) 90 tablet 3     No facility-administered medications prior to visit.       No opioid medication identified on active medication list. I have reviewed chart for other potential  high risk medication/s and harmful drug interactions in the elderly.        Aspirin is not on active medication list.  Aspirin use is not indicated based on review of current medical condition/s. Risk of harm outweighs potential benefits.  .    Patient Active Problem List   Diagnosis    Malignant neoplasm of breast in female, estrogen receptor positive    Major depressive disorder    Mitral regurgitation    Hyperlipidemia LDL goal <100    Mild sleep apnea    Osteopenia    Vitamin D deficiency    Vitamin B 12 deficiency    Benign thyroid cyst    Encounter for annual wellness visit (AWV) in Medicare patient    Depression with anxiety    Osteoarthritis of both hips    Gastroesophageal reflux disease without esophagitis    Environmental allergies    Long-term current use of tamoxifen    Primary hypertension    Sensation of pressure in bladder area    Anemia     Advance Care Planning   Advance Care Planning     Advance Directive is not on file.  ACP discussion was held with the patient during this visit. Patient does not have an advance directive, information provided.     Objective    Vitals:    08/29/23 1029   BP: 116/72   Pulse: 65   SpO2: 96%   Weight: 101 kg (222 lb 12.8 oz)   Height: 170.2 cm (67.01\")     Estimated body mass index is 34.88 kg/mý as calculated from the following:    Height as of this encounter: 170.2 cm (67.01\").    Weight as of this encounter: 101 kg (222 lb 12.8 oz).    Class 2 Severe Obesity (BMI >=35 and <=39.9). Obesity-related health conditions include the following: hypertension and dyslipidemias. Obesity is " unchanged. BMI is is above average; BMI management plan is completed. We discussed portion control and increasing exercise.      Does the patient have evidence of cognitive impairment? No          HEALTH RISK ASSESSMENT    Smoking Status:  Social History     Tobacco Use   Smoking Status Never   Smokeless Tobacco Never     Alcohol Consumption:  Social History     Substance and Sexual Activity   Alcohol Use No     Fall Risk Screen:    MERVINADI Fall Risk Assessment was completed, and patient is at LOW risk for falls.Assessment completed on:2023    Depression Screenin/29/2023    10:29 AM   PHQ-2/PHQ-9 Depression Screening   Little Interest or Pleasure in Doing Things 0-->not at all   Feeling Down, Depressed or Hopeless 0-->not at all   PHQ-9: Brief Depression Severity Measure Score 0       Health Habits and Functional and Cognitive Screenin/29/2023    10:26 AM   Functional & Cognitive Status   Do you have difficulty preparing food and eating? No   Do you have difficulty bathing yourself, getting dressed or grooming yourself? No   Do you have difficulty using the toilet? No   Do you have difficulty moving around from place to place? No   Do you have trouble with steps or getting out of a bed or a chair? No   Current Diet Well Balanced Diet   Dental Exam Up to date   Eye Exam Up to date   Exercise (times per week) 0 times per week   Current Exercises Include No Regular Exercise   Do you need help using the phone?  No   Are you deaf or do you have serious difficulty hearing?  No   Do you need help to go to places out of walking distance? No   Do you need help shopping? No   Do you need help preparing meals?  No   Do you need help with housework?  No   Do you need help with laundry? No   Do you need help taking your medications? No   Do you need help managing money? No   Do you ever drive or ride in a car without wearing a seat belt? No       Age-appropriate Screening Schedule:  Refer to the list below  for future screening recommendations based on patient's age, sex and/or medical conditions. Orders for these recommended tests are listed in the plan section. The patient has been provided with a written plan.    Health Maintenance   Topic Date Due    Pneumococcal Vaccine 65+ (2 - PCV) 08/08/2019    COVID-19 Vaccine (2 - Booster for Ronaldo series) 07/01/2021    LIPID PANEL  02/24/2023    TDAP/TD VACCINES (2 - Td or Tdap) 07/01/2023    ANNUAL WELLNESS VISIT  08/24/2023    INFLUENZA VACCINE  10/01/2023    MAMMOGRAM  05/25/2024    BMI FOLLOWUP  08/29/2024    DXA SCAN  05/25/2025    COLORECTAL CANCER SCREENING  10/10/2028    HEPATITIS C SCREENING  Completed                  CMS Preventative Services Quick Reference  Risk Factors Identified During Encounter  Immunizations Discussed/Encouraged: Influenza, Prevnar 20 (Pneumococcal 20-valent conjugate), Shingrix, and COVID19  Dental Screening Recommended  Vision Screening Recommended  The above risks/problems have been discussed with the patient.  Pertinent information has been shared with the patient in the After Visit Summary.  An After Visit Summary and PPPS were made available to the patient.    Follow Up:   Next Medicare Wellness visit to be scheduled in 1 year.       Additional E&M Note during same encounter follows:  Patient has multiple medical problems which are significant and separately identifiable that require additional work above and beyond the Medicare Wellness Visit.      Chief Complaint  Medicare Wellness-subsequent    Subjective        HPI  Renae Schmitz is also being seen today for the following chronic health conditions:    She was diagnosed with right-sided breast cancer in 2016.  She is currently seeing Dr. Ceja, oncology.  She currently is taking tamoxifen.      Hypertension.  Diagnosed in 1/2022.  The patient takes bisoprolol hydrochlorothiazide 2.5/6.25 daily.  Patient denies any side effects and blood pressures have improved.    Hyperlipidemia.  " Patient currently takes rosuvastatin 20 mg daily.  Her cholesterol was at goal in February 2022.    GERD.  Patient is taking omeprazole 40 mg daily.  Her symptoms are well controlled.    Anxiety and depression.  Patient currently takes Pristiq 100 mg daily.  Patient has been feeling slightly more anxious lately but her depression is well controlled.  She denies SI or HI.         Objective   Vital Signs:  /72   Pulse 65   Ht 170.2 cm (67.01\")   Wt 101 kg (222 lb 12.8 oz)   SpO2 96%   BMI 34.88 kg/mý     Physical Exam  Vitals and nursing note reviewed.   Constitutional:       Appearance: She is well-developed.   HENT:      Head: Normocephalic and atraumatic.      Right Ear: External ear normal.      Left Ear: External ear normal.      Nose: Nose normal.   Eyes:      General: No scleral icterus.     Conjunctiva/sclera: Conjunctivae normal.   Cardiovascular:      Rate and Rhythm: Normal rate and regular rhythm.      Heart sounds: Normal heart sounds.   Pulmonary:      Effort: Pulmonary effort is normal.      Breath sounds: Normal breath sounds.   Musculoskeletal:      Cervical back: Normal range of motion and neck supple.      Right lower leg: No edema.      Left lower leg: No edema.   Skin:     General: Skin is warm and dry.      Findings: No rash.   Neurological:      Mental Status: She is alert and oriented to person, place, and time.   Psychiatric:         Mood and Affect: Mood normal.         Behavior: Behavior normal.         Thought Content: Thought content normal.         Judgment: Judgment normal.        The following data was reviewed by: Brittanie Nevarez DO on 08/29/2023:  Common labs          1/4/2023    09:52 4/4/2023    10:31 6/20/2023    10:58   Common Labs   Glucose 101   97    BUN 16   15    Creatinine 0.76   0.76    Sodium 139   140    Potassium 3.6   4.1    Chloride 105   107    Calcium 9.0   9.0    Albumin 3.9   3.7    Total Bilirubin 0.4   0.3    Alkaline Phosphatase 55   62    AST " (SGOT) 18   18    ALT (SGPT) 14   12    WBC 5.22  5.58  5.78    Hemoglobin 11.4  12.1  12.1    Hematocrit 35.5  35.4  36.7    Platelets 220  210  220                   Assessment and Plan   Diagnoses and all orders for this visit:    1. Medicare annual wellness visit, subsequent (Primary)    2. Primary hypertension    3. Hyperlipidemia LDL goal <100  -     Lipid Panel    4. Depression with anxiety    5. Gastroesophageal reflux disease without esophagitis    6. Encounter for long-term (current) use of medications  -     Lipid Panel  -     TSH        RHM.  Dexa, mammo are up to date.  Colon cancer screening is up-to-date.  Vaccines discussed and recommendations made.      Patient is also here for chronic stable hypertension, hyperlipidemia, depression/anxiety, and GERD.  Surveillance labs were obtained today and any medication changes will be made based on lab results and will be called to the patient later this week.          Follow Up   Return in about 6 months (around 2/29/2024) for HTN.  Patient was given instructions and counseling regarding her condition or for health maintenance advice. Please see specific information pulled into the AVS if appropriate.

## 2023-11-04 DIAGNOSIS — F41.8 DEPRESSION WITH ANXIETY: ICD-10-CM

## 2023-11-06 RX ORDER — DESVENLAFAXINE 100 MG/1
100 TABLET, EXTENDED RELEASE ORAL DAILY
Qty: 90 TABLET | Refills: 1 | Status: SHIPPED | OUTPATIENT
Start: 2023-11-06

## 2023-12-21 DIAGNOSIS — K21.9 GASTROESOPHAGEAL REFLUX DISEASE WITHOUT ESOPHAGITIS: ICD-10-CM

## 2023-12-22 RX ORDER — OMEPRAZOLE 40 MG/1
40 CAPSULE, DELAYED RELEASE ORAL DAILY
Qty: 90 CAPSULE | Refills: 1 | Status: SHIPPED | OUTPATIENT
Start: 2023-12-22

## 2024-01-11 DIAGNOSIS — M16.0 PRIMARY OSTEOARTHRITIS OF BOTH HIPS: ICD-10-CM

## 2024-01-11 RX ORDER — MELOXICAM 7.5 MG/1
7.5-15 TABLET ORAL DAILY
Qty: 60 TABLET | Refills: 0 | Status: SHIPPED | OUTPATIENT
Start: 2024-01-11

## 2024-02-12 DIAGNOSIS — E78.5 HYPERLIPIDEMIA LDL GOAL <100: ICD-10-CM

## 2024-02-12 DIAGNOSIS — I10 PRIMARY HYPERTENSION: ICD-10-CM

## 2024-02-12 DIAGNOSIS — Z91.09 ENVIRONMENTAL ALLERGIES: ICD-10-CM

## 2024-02-12 RX ORDER — ROSUVASTATIN CALCIUM 20 MG/1
20 TABLET, COATED ORAL DAILY
Qty: 90 TABLET | Refills: 0 | Status: SHIPPED | OUTPATIENT
Start: 2024-02-12

## 2024-02-12 RX ORDER — BISOPROLOL FUMARATE AND HYDROCHLOROTHIAZIDE 2.5; 6.25 MG/1; MG/1
1 TABLET ORAL DAILY
Qty: 90 TABLET | Refills: 0 | Status: SHIPPED | OUTPATIENT
Start: 2024-02-12

## 2024-02-12 RX ORDER — MONTELUKAST SODIUM 10 MG/1
10 TABLET ORAL NIGHTLY
Qty: 90 TABLET | Refills: 0 | Status: SHIPPED | OUTPATIENT
Start: 2024-02-12

## 2024-03-04 ENCOUNTER — OFFICE VISIT (OUTPATIENT)
Dept: FAMILY MEDICINE CLINIC | Facility: CLINIC | Age: 74
End: 2024-03-04
Payer: MEDICARE

## 2024-03-04 VITALS
HEIGHT: 67 IN | SYSTOLIC BLOOD PRESSURE: 124 MMHG | OXYGEN SATURATION: 96 % | BODY MASS INDEX: 34.94 KG/M2 | DIASTOLIC BLOOD PRESSURE: 71 MMHG | HEART RATE: 75 BPM | WEIGHT: 222.6 LBS

## 2024-03-04 DIAGNOSIS — I10 PRIMARY HYPERTENSION: Primary | ICD-10-CM

## 2024-03-04 DIAGNOSIS — K21.9 GASTROESOPHAGEAL REFLUX DISEASE WITHOUT ESOPHAGITIS: ICD-10-CM

## 2024-03-04 DIAGNOSIS — F41.8 DEPRESSION WITH ANXIETY: ICD-10-CM

## 2024-03-04 DIAGNOSIS — E78.5 HYPERLIPIDEMIA LDL GOAL <100: ICD-10-CM

## 2024-03-04 DIAGNOSIS — Z79.899 ENCOUNTER FOR LONG-TERM (CURRENT) USE OF MEDICATIONS: ICD-10-CM

## 2024-03-04 DIAGNOSIS — M16.0 PRIMARY OSTEOARTHRITIS OF BOTH HIPS: ICD-10-CM

## 2024-03-04 DIAGNOSIS — L71.0 PERIORAL DERMATITIS: ICD-10-CM

## 2024-03-04 PROCEDURE — 1160F RVW MEDS BY RX/DR IN RCRD: CPT | Performed by: FAMILY MEDICINE

## 2024-03-04 PROCEDURE — 1159F MED LIST DOCD IN RCRD: CPT | Performed by: FAMILY MEDICINE

## 2024-03-04 PROCEDURE — 3074F SYST BP LT 130 MM HG: CPT | Performed by: FAMILY MEDICINE

## 2024-03-04 PROCEDURE — 99214 OFFICE O/P EST MOD 30 MIN: CPT | Performed by: FAMILY MEDICINE

## 2024-03-04 PROCEDURE — 3078F DIAST BP <80 MM HG: CPT | Performed by: FAMILY MEDICINE

## 2024-03-04 RX ORDER — MINOCYCLINE HYDROCHLORIDE 100 MG/1
100 TABLET ORAL 2 TIMES DAILY
Qty: 28 TABLET | Refills: 0 | Status: SHIPPED | OUTPATIENT
Start: 2024-03-04 | End: 2024-03-18

## 2024-03-04 NOTE — PROGRESS NOTES
"Chief Complaint  Hypertension    Subjective        Renae Schmitz presents to [facility]  History of Present Illness  Patient is here today for the following chronic health conditions:    She was diagnosed with right-sided breast cancer in 2016.  She is currently seeing Dr. Ceja, oncology.  She currently is taking tamoxifen.      Hypertension.  Diagnosed in 1/2022.  The patient takes bisoprolol hydrochlorothiazide 2.5/6.25 daily.  Patient denies any side effects and blood pressures have improved.    Hyperlipidemia.  Patient currently takes rosuvastatin 20 mg daily.  Her cholesterol was at goal in February 2022.    GERD.  Patient is taking omeprazole 40 mg daily.  Her symptoms are well controlled.    Anxiety and depression.  Patient currently takes Pristiq 100 mg daily.  Her depression is well controlled.  She denies SI or HI.    Rash on face.  Patient developed a rash on her chin about 3 weeks ago.  It does not itch or bother her.  It does not seem to be getting worse but it is not getting any better.        Objective   Vital Signs:  /71   Pulse 75   Ht 170.2 cm (67\")   Wt 101 kg (222 lb 9.6 oz)   SpO2 96%   BMI 34.86 kg/m²   Estimated body mass index is 34.86 kg/m² as calculated from the following:    Height as of this encounter: 170.2 cm (67\").    Weight as of this encounter: 101 kg (222 lb 9.6 oz).         Physical Exam  Vitals and nursing note reviewed.   Constitutional:       Appearance: Normal appearance. She is well-developed.   HENT:      Head: Normocephalic and atraumatic.   Eyes:      Conjunctiva/sclera: Conjunctivae normal.   Cardiovascular:      Rate and Rhythm: Normal rate and regular rhythm.      Heart sounds: Normal heart sounds.   Pulmonary:      Effort: Pulmonary effort is normal.      Breath sounds: Normal breath sounds.   Musculoskeletal:         General: Normal range of motion.      Cervical back: Normal range of motion and neck supple.      Right lower " leg: No edema.      Left lower leg: No edema.   Skin:     General: Skin is warm and dry.      Capillary Refill: Capillary refill takes less than 2 seconds.      Findings: Rash (Erythematous papules about 10 and a cluster on the right side of her chin, no edema, no induration.) present.   Neurological:      Mental Status: She is alert and oriented to person, place, and time.   Psychiatric:         Mood and Affect: Mood normal.         Behavior: Behavior normal.         Thought Content: Thought content normal.         Judgment: Judgment normal.        Result Review :    The following data was reviewed by: Brittanie Nevarez DO on 03/04/2024:  Common labs          4/4/2023    10:31 6/20/2023    10:58 8/29/2023    11:27   Common Labs   Glucose  97     BUN  15     Creatinine  0.76     Sodium  140     Potassium  4.1     Chloride  107     Calcium  9.0     Albumin  3.7     Total Bilirubin  0.3     Alkaline Phosphatase  62     AST (SGOT)  18     ALT (SGPT)  12     WBC 5.58  5.78     Hemoglobin 12.1  12.1     Hematocrit 35.4  36.7     Platelets 210  220     Total Cholesterol   157    Triglycerides   87    HDL Cholesterol   71    LDL Cholesterol    70      TSH          8/29/2023    11:27   TSH   TSH 2.090                   Assessment and Plan     Diagnoses and all orders for this visit:    1. Primary hypertension (Primary)    2. Hyperlipidemia LDL goal <100    3. Primary osteoarthritis of both hips    4. Gastroesophageal reflux disease without esophagitis    5. Depression with anxiety    6. Encounter for long-term (current) use of medications    7. Perioral dermatitis  -     minocycline (DYNACIN) 100 MG tablet; Take 1 tablet by mouth 2 (Two) Times a Day for 14 days.  Dispense: 28 tablet; Refill: 0      Patient is here today for chronic stable hypertension, hyperlipidemia, arthritis, GERD and anxiety.  Surveillance labs were obtained today and any medication changes will be made based on lab results and will be called to the  patient later this week.     Patient is also here for new problem of a rash on her chin.  I believe this is perioral dermatitis and have prescribed 7 days of minocycline.  Patient was advised to return to the office if her symptoms persist or worsen.       Follow Up     Return in about 6 months (around 9/4/2024) for Medicare Wellness, HTN, Depression.  Patient was given instructions and counseling regarding her condition or for health maintenance advice. Please see specific information pulled into the AVS if appropriate.

## 2024-03-05 LAB
ALBUMIN SERPL-MCNC: 4 G/DL (ref 3.5–5.2)
ALBUMIN/GLOB SERPL: 1.6 G/DL
ALP SERPL-CCNC: 78 U/L (ref 39–117)
ALT SERPL-CCNC: 14 U/L (ref 1–33)
AST SERPL-CCNC: 20 U/L (ref 1–32)
BASOPHILS # BLD AUTO: 0.01 10*3/MM3 (ref 0–0.2)
BASOPHILS NFR BLD AUTO: 0.2 % (ref 0–1.5)
BILIRUB SERPL-MCNC: 0.4 MG/DL (ref 0–1.2)
BUN SERPL-MCNC: 19 MG/DL (ref 8–23)
BUN/CREAT SERPL: 24.7 (ref 7–25)
CALCIUM SERPL-MCNC: 9.1 MG/DL (ref 8.6–10.5)
CHLORIDE SERPL-SCNC: 107 MMOL/L (ref 98–107)
CO2 SERPL-SCNC: 23.2 MMOL/L (ref 22–29)
CREAT SERPL-MCNC: 0.77 MG/DL (ref 0.57–1)
EGFRCR SERPLBLD CKD-EPI 2021: 80.6 ML/MIN/1.73
EOSINOPHIL # BLD AUTO: 0.15 10*3/MM3 (ref 0–0.4)
EOSINOPHIL NFR BLD AUTO: 2.3 % (ref 0.3–6.2)
ERYTHROCYTE [DISTWIDTH] IN BLOOD BY AUTOMATED COUNT: 12.6 % (ref 12.3–15.4)
GLOBULIN SER CALC-MCNC: 2.5 GM/DL
GLUCOSE SERPL-MCNC: 95 MG/DL (ref 65–99)
HCT VFR BLD AUTO: 37.8 % (ref 34–46.6)
HGB BLD-MCNC: 12 G/DL (ref 12–15.9)
IMM GRANULOCYTES # BLD AUTO: 0.03 10*3/MM3 (ref 0–0.05)
IMM GRANULOCYTES NFR BLD AUTO: 0.5 % (ref 0–0.5)
LYMPHOCYTES # BLD AUTO: 2.33 10*3/MM3 (ref 0.7–3.1)
LYMPHOCYTES NFR BLD AUTO: 35 % (ref 19.6–45.3)
MCH RBC QN AUTO: 28.8 PG (ref 26.6–33)
MCHC RBC AUTO-ENTMCNC: 31.7 G/DL (ref 31.5–35.7)
MCV RBC AUTO: 90.9 FL (ref 79–97)
MONOCYTES # BLD AUTO: 0.58 10*3/MM3 (ref 0.1–0.9)
MONOCYTES NFR BLD AUTO: 8.7 % (ref 5–12)
NEUTROPHILS # BLD AUTO: 3.56 10*3/MM3 (ref 1.7–7)
NEUTROPHILS NFR BLD AUTO: 53.3 % (ref 42.7–76)
NRBC BLD AUTO-RTO: 0 /100 WBC (ref 0–0.2)
PLATELET # BLD AUTO: 274 10*3/MM3 (ref 140–450)
POTASSIUM SERPL-SCNC: 4.8 MMOL/L (ref 3.5–5.2)
PROT SERPL-MCNC: 6.5 G/DL (ref 6–8.5)
RBC # BLD AUTO: 4.16 10*6/MM3 (ref 3.77–5.28)
SODIUM SERPL-SCNC: 142 MMOL/L (ref 136–145)
WBC # BLD AUTO: 6.66 10*3/MM3 (ref 3.4–10.8)

## 2024-05-07 DIAGNOSIS — M16.0 PRIMARY OSTEOARTHRITIS OF BOTH HIPS: ICD-10-CM

## 2024-05-07 DIAGNOSIS — I10 PRIMARY HYPERTENSION: ICD-10-CM

## 2024-05-07 DIAGNOSIS — Z91.09 ENVIRONMENTAL ALLERGIES: ICD-10-CM

## 2024-05-07 DIAGNOSIS — F41.8 DEPRESSION WITH ANXIETY: ICD-10-CM

## 2024-05-07 DIAGNOSIS — E78.5 HYPERLIPIDEMIA LDL GOAL <100: ICD-10-CM

## 2024-05-07 RX ORDER — MONTELUKAST SODIUM 10 MG/1
10 TABLET ORAL NIGHTLY
Qty: 90 TABLET | Refills: 1 | Status: SHIPPED | OUTPATIENT
Start: 2024-05-07

## 2024-05-07 RX ORDER — MELOXICAM 7.5 MG/1
7.5-15 TABLET ORAL DAILY
Qty: 60 TABLET | Refills: 0 | Status: SHIPPED | OUTPATIENT
Start: 2024-05-07

## 2024-05-07 RX ORDER — ROSUVASTATIN CALCIUM 20 MG/1
20 TABLET, COATED ORAL DAILY
Qty: 90 TABLET | Refills: 1 | Status: SHIPPED | OUTPATIENT
Start: 2024-05-07

## 2024-05-07 RX ORDER — DESVENLAFAXINE 100 MG/1
100 TABLET, EXTENDED RELEASE ORAL DAILY
Qty: 90 TABLET | Refills: 1 | Status: SHIPPED | OUTPATIENT
Start: 2024-05-07

## 2024-05-07 RX ORDER — BISOPROLOL FUMARATE AND HYDROCHLOROTHIAZIDE 2.5; 6.25 MG/1; MG/1
1 TABLET ORAL DAILY
Qty: 90 TABLET | Refills: 1 | Status: SHIPPED | OUTPATIENT
Start: 2024-05-07

## 2024-06-19 ENCOUNTER — HOSPITAL ENCOUNTER (OUTPATIENT)
Dept: MAMMOGRAPHY | Facility: HOSPITAL | Age: 74
Discharge: HOME | End: 2024-06-19
Admitting: INTERNAL MEDICINE
Payer: COMMERCIAL

## 2024-06-19 DIAGNOSIS — C50.011 MALIGNANT NEOPLASM OF AREOLA OF RIGHT BREAST IN FEMALE, ESTROGEN RECEPTOR POSITIVE: ICD-10-CM

## 2024-06-19 DIAGNOSIS — Z17.0 MALIGNANT NEOPLASM OF AREOLA OF RIGHT BREAST IN FEMALE, ESTROGEN RECEPTOR POSITIVE: ICD-10-CM

## 2024-06-19 PROCEDURE — 77063 BREAST TOMOSYNTHESIS BI: CPT

## 2024-06-19 PROCEDURE — 77067 SCR MAMMO BI INCL CAD: CPT

## 2024-06-20 NOTE — PROGRESS NOTES
Subjective   REASON FOR FOLLOWUP:    1.Right breast cancer T1c N0 ER/IA positive HER-2 negative, Oncotype score of 20  2.  Osteopenia                               REQUESTING PHYSICIAN:  Trino Troncoso M.D.        History of Present Illness patient is a 73-year-old lady with a history of right breast cancer [id number] ER/IA positive HER-2 negative here for follow-up over 5 years from diagnosis.  Adjuvantly she received radiation and was initially on AI therapy which was subsequently switched to tamoxifen secondary to decreasing bone density.  She completed 5 years of tamoxifen December 2023.    The patient returned today for follow-up doing well.  She continues to have vasomotor symptoms despite being off tamoxifen.  She denies changes in the self breast examination.  She denies pain or weight loss.      Past Medical History:   Diagnosis Date    Anemia     prior to hysterectomy several years ago    Anxiety     Benign thyroid cyst 1971    Breast CA 12/01/2016    Right lumpectomy    Depression     Fibromyalgia     Fracture of wrist     H/O mammogram 12/04/2018    Heart murmur     History of ankle fracture     Right    History of Papanicolaou smear of cervix 2016    Dr. Johnson    Hx of radiation therapy     right breast ca 2016    Hypercholesterolemia     Hyperlipidemia     Mitral valve regurgitation     Mood swings     MARSHALL on CPAP 01/22/2019    PONV (postoperative nausea and vomiting)     Sleep apnea         Past Surgical History:   Procedure Laterality Date    BREAST BIOPSY      BREAST LUMPECTOMY WITH SENTINEL NODE BIOPSY AND AXILLARY NODE DISSECTION Right 12/13/2016    Procedure: RT BREAST LUMPECTOMY WITH SENTINEL NODE BIOPSY & MAMMO NEEDLE LOC;  Surgeon: Matt Troncoso MD;  [place] [facility] [place] [facility] [place]:     COLONOSCOPY N/A 10/10/2018    Procedure: COLONOSCOPY TO CECUM AND TERMINAL ILEUM WITH COLD BIOPSIES;  Surgeon: Carl Salas MD;  Location: [facility] ENDOSCOPY;  Service: Gastroenterology    HAND SURGERY Left  "2003    2 FINGERS PINNED    HYSTERECTOMY  1995    Complete Sept 1995    PERCUTANEOUS PINNING FINGER FRACTURE      THYROID CYST EXCISION  1971    TUBAL ABDOMINAL LIGATION         Current Outpatient Medications on File Prior to Visit   Medication Sig Dispense Refill    bisoprolol-hydrochlorothiazide (ZIAC) 2.5-6.25 MG per tablet TAKE 1 TABLET BY MOUTH DAILY 90 tablet 1    desvenlafaxine (PRISTIQ) 100 MG 24 hr tablet TAKE 1 TABLET BY MOUTH DAILY 90 tablet 1    fluconazole (DIFLUCAN) 150 MG tablet Take 1 po then repeat in 7 days 2 tablet 0    Loratadine (Claritin) 10 MG capsule Take 1 capsule by mouth Daily. 90 each 1    meloxicam (MOBIC) 7.5 MG tablet TAKE 1 TO 2 TABLETS BY MOUTH DAILY 60 tablet 0    montelukast (SINGULAIR) 10 MG tablet TAKE ONE TABLET BY MOUTH ONCE NIGHTLY 90 tablet 1    nitrofurantoin, macrocrystal-monohydrate, (MACROBID) 100 MG capsule Take 1 po bid 14 capsule 0    omeprazole (priLOSEC) 40 MG capsule TAKE ONE CAPSULE BY MOUTH DAILY 90 capsule 1    phenazopyridine (PYRIDIUM) 200 MG tablet Take 1 tablet by mouth 3 (Three) Times a Day. 6 tablet 0    rosuvastatin (CRESTOR) 20 MG tablet TAKE 1 TABLET BY MOUTH DAILY 90 tablet 1     No current facility-administered medications on file prior to visit.        ALLERGIES:    Allergies   Allergen Reactions    Tegaderm Ag Mesh [Silver] Other (See Comments)     SKIN BLISTERS  SKIN BLISTERS    Covid-19 (Mrna) Vaccine Dizziness    Shingrix [Zoster Vac Recomb Adjuvanted] Other (See Comments)     \"ice water sensation in legs\"    Sulfa Antibiotics Rash    Zithromax [Azithromycin] Rash        Social History     Socioeconomic History    Marital status:      Spouse name: Peter    Years of education: High school   Tobacco Use    Smoking status: Never    Smokeless tobacco: Never   Vaping Use    Vaping status: Never Used   Substance and Sexual Activity    Alcohol use: No    Drug use: No    Sexual activity: Yes     Partners: Male     Birth control/protection: Surgical " "       Family History   Problem Relation Age of Onset    Heart failure Mother     Colon cancer Father     Liver cancer Father     Tuberculosis Father     Breast cancer Sister 58        DCIS    Depression Daughter       Father  at 65 of metastatic colon cancer his parents lived to their 80s without cancer mother  at 93 of CHF and had only one brother who had no cancer.  She has only one sister who had breast cancer at age 58 and again at 63 no ovarian cancer in the family  Review of Systems   Constitutional:  Negative for activity change, appetite change, chills, fever and unexpected weight change.   Eyes:  Positive for visual disturbance (cataract).   Respiratory:  Negative for cough, chest tightness and shortness of breath.    Cardiovascular:  Negative for chest pain and leg swelling.   Gastrointestinal:  Negative for constipation, diarrhea, nausea and vomiting.   Endocrine: Positive for heat intolerance.   Genitourinary:  Negative for difficulty urinating.   Musculoskeletal:  Negative for back pain, joint swelling, myalgias and neck pain.   Neurological:  Negative for dizziness and headaches.   Psychiatric/Behavioral:  Negative for dysphoric mood.    All other systems reviewed and are negative.        Objective     Vitals:    24 1241   BP: 121/80   Pulse: 73   Resp: 16   Temp: 98.6 °F (37 °C)   TempSrc: Infrared   SpO2: 96%   Weight: 101 kg (221 lb 12.8 oz)   Height: 170.2 cm (67.01\")   PainSc: 0-No pain            2024    12:45 PM   Current Status   ECOG score 0       Physical Exam    CONSTITUTIONAL: pleasant well-developed adult woman  CV: RRR, S1S2, no murmur  RESP: cta bilat, no wheezing, no rales  Breast: Lumpectomy changes right breast, no abnormal masses or lymphadenopathy noted  GI: soft, non-tender, no splenomegaly, +bs  MUSC: no edema, normal gait  NEURO: alert and oriented x3, normal strength  PSYCH: normal mood and affect      RECENT LABS:  Hematology WBC   Date Value Ref Range " Status   06/26/2024 5.50 3.40 - 10.80 10*3/mm3 Final   03/04/2024 6.66 3.40 - 10.80 10*3/mm3 Final     RBC   Date Value Ref Range Status   06/26/2024 4.13 3.77 - 5.28 10*6/mm3 Final   03/04/2024 4.16 3.77 - 5.28 10*6/mm3 Final     Hemoglobin   Date Value Ref Range Status   06/26/2024 12.5 12.0 - 15.9 g/dL Final     Hematocrit   Date Value Ref Range Status   06/26/2024 38.9 34.0 - 46.6 % Final     Platelets   Date Value Ref Range Status   06/26/2024 254 140 - 450 10*3/mm3 Final            Mammo Screening Digital Tomosynthesis Bilateral With CAD (06/19/2024 11:00)   Impression:  Stable post lumpectomy changes in the right breast. There is no  mammographic evidence of malignancy at this time.  Screening mammogram in 1 year is recommended.    Assessment & Plan   1.  T1 CN 0M0 ER/MT positive HER-2 negative right-sided breast cancer   oncotype score of 20 -Arimidex plus radiation planned started in 3/17  Worsening bone density in 3/19 switch to tamoxifen in 8/19  Total 7 years adjuvant hormonal therapy planned-she completed 7 years of hormonal therapy December 2023      2.  Osteopenia  Most recent bone density 5/20/2021 showed osteopenia with left hip T score -2.1, L-spine -1.2 essentially stable since switching to tamoxifen    3.  Depression/anxiety on Pristiq per her PCP      Plan  The patient has no evidence of disease.  She needs annual breast exam and bilateral screening mammography.  She prefers to get this done with her PCP at this point.  I will see the patient back as needed.

## 2024-06-26 ENCOUNTER — LAB (OUTPATIENT)
Dept: LAB | Facility: HOSPITAL | Age: 74
End: 2024-06-26
Payer: COMMERCIAL

## 2024-06-26 ENCOUNTER — OFFICE VISIT (OUTPATIENT)
Dept: ONCOLOGY | Facility: CLINIC | Age: 74
End: 2024-06-26
Payer: COMMERCIAL

## 2024-06-26 VITALS
SYSTOLIC BLOOD PRESSURE: 121 MMHG | WEIGHT: 221.8 LBS | BODY MASS INDEX: 34.81 KG/M2 | DIASTOLIC BLOOD PRESSURE: 80 MMHG | RESPIRATION RATE: 16 BRPM | HEART RATE: 73 BPM | OXYGEN SATURATION: 96 % | TEMPERATURE: 98.6 F | HEIGHT: 67 IN

## 2024-06-26 DIAGNOSIS — Z17.0 MALIGNANT NEOPLASM OF AREOLA OF RIGHT BREAST IN FEMALE, ESTROGEN RECEPTOR POSITIVE: Primary | ICD-10-CM

## 2024-06-26 DIAGNOSIS — C50.011 MALIGNANT NEOPLASM OF AREOLA OF RIGHT BREAST IN FEMALE, ESTROGEN RECEPTOR POSITIVE: Primary | ICD-10-CM

## 2024-06-26 LAB
ALBUMIN SERPL-MCNC: 4.2 G/DL (ref 3.5–5.2)
ALBUMIN/GLOB SERPL: 1.4 G/DL
ALP SERPL-CCNC: 98 U/L (ref 39–117)
ALT SERPL W P-5'-P-CCNC: 15 U/L (ref 1–33)
ANION GAP SERPL CALCULATED.3IONS-SCNC: 9.8 MMOL/L (ref 5–15)
AST SERPL-CCNC: 24 U/L (ref 1–32)
BASOPHILS # BLD AUTO: 0.01 10*3/MM3 (ref 0–0.2)
BASOPHILS NFR BLD AUTO: 0.2 % (ref 0–1.5)
BILIRUB SERPL-MCNC: 0.3 MG/DL (ref 0–1.2)
BUN SERPL-MCNC: 23 MG/DL (ref 8–23)
BUN/CREAT SERPL: 24.7 (ref 7–25)
CALCIUM SPEC-SCNC: 9.3 MG/DL (ref 8.6–10.5)
CHLORIDE SERPL-SCNC: 104 MMOL/L (ref 98–107)
CO2 SERPL-SCNC: 25.2 MMOL/L (ref 22–29)
CREAT SERPL-MCNC: 0.93 MG/DL (ref 0.57–1)
DEPRECATED RDW RBC AUTO: 47.7 FL (ref 37–54)
EGFRCR SERPLBLD CKD-EPI 2021: 64.2 ML/MIN/1.73
EOSINOPHIL # BLD AUTO: 0.16 10*3/MM3 (ref 0–0.4)
EOSINOPHIL NFR BLD AUTO: 2.9 % (ref 0.3–6.2)
ERYTHROCYTE [DISTWIDTH] IN BLOOD BY AUTOMATED COUNT: 13.6 % (ref 12.3–15.4)
GLOBULIN UR ELPH-MCNC: 2.9 GM/DL
GLUCOSE SERPL-MCNC: 89 MG/DL (ref 65–99)
HCT VFR BLD AUTO: 38.9 % (ref 34–46.6)
HGB BLD-MCNC: 12.5 G/DL (ref 12–15.9)
IMM GRANULOCYTES # BLD AUTO: 0.02 10*3/MM3 (ref 0–0.05)
IMM GRANULOCYTES NFR BLD AUTO: 0.4 % (ref 0–0.5)
LYMPHOCYTES # BLD AUTO: 2.4 10*3/MM3 (ref 0.7–3.1)
LYMPHOCYTES NFR BLD AUTO: 43.6 % (ref 19.6–45.3)
MCH RBC QN AUTO: 30.3 PG (ref 26.6–33)
MCHC RBC AUTO-ENTMCNC: 32.1 G/DL (ref 31.5–35.7)
MCV RBC AUTO: 94.2 FL (ref 79–97)
MONOCYTES # BLD AUTO: 0.49 10*3/MM3 (ref 0.1–0.9)
MONOCYTES NFR BLD AUTO: 8.9 % (ref 5–12)
NEUTROPHILS NFR BLD AUTO: 2.42 10*3/MM3 (ref 1.7–7)
NEUTROPHILS NFR BLD AUTO: 44 % (ref 42.7–76)
PLATELET # BLD AUTO: 254 10*3/MM3 (ref 140–450)
PMV BLD AUTO: 10 FL (ref 6–12)
POTASSIUM SERPL-SCNC: 3.9 MMOL/L (ref 3.5–5.2)
PROT SERPL-MCNC: 7.1 G/DL (ref 6–8.5)
RBC # BLD AUTO: 4.13 10*6/MM3 (ref 3.77–5.28)
SODIUM SERPL-SCNC: 139 MMOL/L (ref 136–145)
WBC NRBC COR # BLD AUTO: 5.5 10*3/MM3 (ref 3.4–10.8)

## 2024-06-26 PROCEDURE — 85025 COMPLETE CBC W/AUTO DIFF WBC: CPT | Performed by: INTERNAL MEDICINE

## 2024-06-26 PROCEDURE — 80053 COMPREHEN METABOLIC PANEL: CPT | Performed by: INTERNAL MEDICINE

## 2024-06-26 PROCEDURE — 36415 COLL VENOUS BLD VENIPUNCTURE: CPT

## 2024-06-26 PROCEDURE — 99213 OFFICE O/P EST LOW 20 MIN: CPT | Performed by: INTERNAL MEDICINE

## 2024-06-29 DIAGNOSIS — K21.9 GASTROESOPHAGEAL REFLUX DISEASE WITHOUT ESOPHAGITIS: ICD-10-CM

## 2024-07-01 RX ORDER — OMEPRAZOLE 40 MG/1
40 CAPSULE, DELAYED RELEASE ORAL DAILY
Qty: 90 CAPSULE | Refills: 1 | Status: SHIPPED | OUTPATIENT
Start: 2024-07-01

## 2024-08-16 DIAGNOSIS — F41.8 DEPRESSION WITH ANXIETY: ICD-10-CM

## 2024-08-16 DIAGNOSIS — E78.5 HYPERLIPIDEMIA LDL GOAL <100: ICD-10-CM

## 2024-08-16 DIAGNOSIS — M16.0 PRIMARY OSTEOARTHRITIS OF BOTH HIPS: ICD-10-CM

## 2024-08-16 DIAGNOSIS — I10 PRIMARY HYPERTENSION: ICD-10-CM

## 2024-08-16 DIAGNOSIS — K21.9 GASTROESOPHAGEAL REFLUX DISEASE WITHOUT ESOPHAGITIS: ICD-10-CM

## 2024-08-16 DIAGNOSIS — Z91.09 ENVIRONMENTAL ALLERGIES: ICD-10-CM

## 2024-08-16 NOTE — TELEPHONE ENCOUNTER
Caller: Renae Schmitz    Relationship: Self    Best call back number: [phone]     Requested Prescriptions:   Requested Prescriptions     Pending Prescriptions Disp Refills    desvenlafaxine (PRISTIQ) 100 MG 24 hr tablet 90 tablet 1     Sig: Take 1 tablet by mouth Daily.    meloxicam (MOBIC) 7.5 MG tablet 60 tablet 0     Sig: Take 1-2 tablets by mouth Daily.    montelukast (SINGULAIR) 10 MG tablet 90 tablet 1     Sig: Take 1 tablet by mouth Every Night.    omeprazole (priLOSEC) 40 MG capsule 90 capsule 1     Sig: Take 1 capsule by mouth Daily.    rosuvastatin (CRESTOR) 20 MG tablet 90 tablet 1     Sig: Take 1 tablet by mouth Daily.    bisoprolol-hydrochlorothiazide (ZIAC) 2.5-6.25 MG per tablet 90 tablet 1     Sig: Take 1 tablet by mouth Daily.        Pharmacy where request should be sent: [facility] [id number] [address] AT [facility] - [phone] [facility] [phone] FX     Last office visit with prescribing clinician: 3/4/2024   Last telemedicine visit with prescribing clinician: Visit date not found   Next office visit with prescribing clinician: 9/4/2024     Does the patient have less than a 3 day supply:  [] Yes  [x] No    Would you like a call back once the refill request has been completed: [] Yes [x] No    If the office needs to give you a call back, can they leave a voicemail: [] Yes [x] No    Rios Francisco   08/16/24 12:03 EDT

## 2024-08-19 RX ORDER — MELOXICAM 7.5 MG/1
7.5-15 TABLET ORAL DAILY
Qty: 60 TABLET | Refills: 0 | Status: SHIPPED | OUTPATIENT
Start: 2024-08-19

## 2024-08-19 RX ORDER — DESVENLAFAXINE 100 MG/1
100 TABLET, EXTENDED RELEASE ORAL DAILY
Qty: 90 TABLET | Refills: 1 | Status: SHIPPED | OUTPATIENT
Start: 2024-08-19

## 2024-08-19 RX ORDER — OMEPRAZOLE 40 MG/1
40 CAPSULE, DELAYED RELEASE ORAL DAILY
Qty: 90 CAPSULE | Refills: 1 | Status: SHIPPED | OUTPATIENT
Start: 2024-08-19

## 2024-08-19 RX ORDER — MONTELUKAST SODIUM 10 MG/1
10 TABLET ORAL NIGHTLY
Qty: 90 TABLET | Refills: 1 | Status: SHIPPED | OUTPATIENT
Start: 2024-08-19

## 2024-08-19 RX ORDER — BISOPROLOL FUMARATE AND HYDROCHLOROTHIAZIDE 2.5; 6.25 MG/1; MG/1
1 TABLET ORAL DAILY
Qty: 90 TABLET | Refills: 1 | Status: SHIPPED | OUTPATIENT
Start: 2024-08-19

## 2024-08-19 RX ORDER — ROSUVASTATIN CALCIUM 20 MG/1
20 TABLET, COATED ORAL DAILY
Qty: 90 TABLET | Refills: 1 | Status: SHIPPED | OUTPATIENT
Start: 2024-08-19

## 2024-10-07 ENCOUNTER — PATIENT OUTREACH (OUTPATIENT)
Dept: FAMILY MEDICINE CLINIC | Facility: CLINIC | Age: 74
End: 2024-10-07
Payer: MEDICARE

## 2024-10-07 NOTE — OUTREACH NOTE
Called to reschedule missed Medicare Wellness Visit.  Patient stated she had not put it on her calendar, so she did not remember she had it.  Asked if I could reschedule the visit and patient said she would have to call us back to reschedule.

## (undated) DEVICE — CANN NASL CO2 TRULINK W/O2 A/

## (undated) DEVICE — TUBING, SUCTION, 1/4" X 10', STRAIGHT: Brand: MEDLINE

## (undated) DEVICE — THE TORRENT IRRIGATION SCOPE CONNECTOR IS USED WITH THE TORRENT IRRIGATION TUBING TO PROVIDE IRRIGATION FLUIDS SUCH AS STERILE WATER DURING GASTROINTESTINAL ENDOSCOPIC PROCEDURES WHEN USED IN CONJUNCTION WITH AN IRRIGATION PUMP (OR ELECTROSURGICAL UNIT).: Brand: TORRENT

## (undated) DEVICE — SINGLE-USE BIOPSY FORCEPS: Brand: RADIAL JAW 4

## (undated) DEVICE — Device: Brand: DEFENDO AIR/WATER/SUCTION AND BIOPSY VALVE